# Patient Record
Sex: FEMALE | Race: WHITE | NOT HISPANIC OR LATINO | Employment: OTHER | URBAN - METROPOLITAN AREA
[De-identification: names, ages, dates, MRNs, and addresses within clinical notes are randomized per-mention and may not be internally consistent; named-entity substitution may affect disease eponyms.]

---

## 2017-01-24 DIAGNOSIS — J45.909 UNCOMPLICATED ASTHMA: ICD-10-CM

## 2017-01-24 RX ORDER — ALBUTEROL SULFATE 90 UG/1
AEROSOL, METERED RESPIRATORY (INHALATION)
Qty: 27 EACH | Refills: 0 | Status: SHIPPED | OUTPATIENT
Start: 2017-01-24 | End: 2018-03-12 | Stop reason: SDUPTHER

## 2017-03-23 DIAGNOSIS — I10 ESSENTIAL HYPERTENSION: ICD-10-CM

## 2017-03-23 RX ORDER — LISINOPRIL 30 MG/1
TABLET ORAL
Qty: 90 TABLET | Refills: 0 | Status: SHIPPED | OUTPATIENT
Start: 2017-03-23 | End: 2017-09-11 | Stop reason: SDUPTHER

## 2017-09-11 DIAGNOSIS — I10 ESSENTIAL HYPERTENSION: ICD-10-CM

## 2017-09-11 RX ORDER — LISINOPRIL 30 MG/1
TABLET ORAL
Qty: 90 TABLET | Refills: 0 | Status: SHIPPED | OUTPATIENT
Start: 2017-09-11 | End: 2017-11-27 | Stop reason: SDUPTHER

## 2017-11-27 DIAGNOSIS — I10 ESSENTIAL HYPERTENSION: ICD-10-CM

## 2017-11-27 RX ORDER — LISINOPRIL 30 MG/1
TABLET ORAL
Qty: 90 TABLET | Refills: 0 | Status: SHIPPED | OUTPATIENT
Start: 2017-11-27 | End: 2018-03-10 | Stop reason: SDUPTHER

## 2018-03-10 DIAGNOSIS — I10 ESSENTIAL HYPERTENSION: ICD-10-CM

## 2018-03-10 RX ORDER — LISINOPRIL 30 MG/1
TABLET ORAL
Qty: 90 TABLET | Refills: 0 | Status: SHIPPED | OUTPATIENT
Start: 2018-03-10 | End: 2019-03-07 | Stop reason: SDUPTHER

## 2018-03-12 DIAGNOSIS — J45.909 UNCOMPLICATED ASTHMA: ICD-10-CM

## 2018-03-12 RX ORDER — ALBUTEROL SULFATE 90 UG/1
AEROSOL, METERED RESPIRATORY (INHALATION)
Qty: 9 EACH | Refills: 2 | Status: SHIPPED | OUTPATIENT
Start: 2018-03-12 | End: 2019-06-21 | Stop reason: SDUPTHER

## 2018-05-14 ENCOUNTER — OFFICE VISIT (OUTPATIENT)
Dept: OPHTHALMOLOGY | Facility: CLINIC | Age: 68
End: 2018-05-14
Payer: MEDICARE

## 2018-05-14 DIAGNOSIS — Z46.0 ENCOUNTER FOR FITTING OR ADJUSTMENT OF SPECTACLES OR CONTACT LENSES: ICD-10-CM

## 2018-05-14 DIAGNOSIS — H52.13 HIGH MYOPIA, BOTH EYES: ICD-10-CM

## 2018-05-14 DIAGNOSIS — H25.13 CATARACT, NUCLEAR SCLEROTIC SENILE, BILATERAL: Primary | ICD-10-CM

## 2018-05-14 PROCEDURE — 92015 DETERMINE REFRACTIVE STATE: CPT | Mod: ,,, | Performed by: OPTOMETRIST

## 2018-05-14 PROCEDURE — 92014 COMPRE OPH EXAM EST PT 1/>: CPT | Mod: S$PBB,,, | Performed by: OPTOMETRIST

## 2018-05-14 PROCEDURE — 92310 CONTACT LENS FITTING OU: CPT | Mod: ,,, | Performed by: OPTOMETRIST

## 2018-05-14 PROCEDURE — 99211 OFF/OP EST MAY X REQ PHY/QHP: CPT | Mod: PBBFAC,PO | Performed by: OPTOMETRIST

## 2018-05-14 PROCEDURE — 99999 PR PBB SHADOW E&M-EST. PATIENT-LVL I: CPT | Mod: PBBFAC,,, | Performed by: OPTOMETRIST

## 2018-05-14 NOTE — PROGRESS NOTES
HPI     Right eye has been watering. Blurred vision both eyes right eye more.   Last eye exam 04/05/2016 TRF. update glasses and contact lenses RX.   Discuss fee to update contact lenses. Out of contact lenses last pair.    Last edited by Karma Arteaga on 5/14/2018 11:25 AM. (History)            Assessment /Plan     For exam results, see Encounter Report.    Cataract, nuclear sclerotic senile, bilateral    High myopia, both eyes    Encounter for fitting or adjustment of spectacles or contact lenses      Moderate cataracts OU, not surgical, OD>OS    Dispense Final Rx for glasses.  RTC 6 months CL ck, NS ck  Discussed above and answered questions.  Discussed signs and symptoms of retinal detachment.  Informed patient to return to clinic if any worsening of symptoms or decreased vision.  Sister had a retinal detachment

## 2018-05-15 ENCOUNTER — CLINICAL SUPPORT (OUTPATIENT)
Dept: AUDIOLOGY | Facility: CLINIC | Age: 68
End: 2018-05-15
Payer: MEDICARE

## 2018-05-15 DIAGNOSIS — Z46.1 ENCOUNTER FOR FITTING AND ADJUSTMENT OF HEARING AID OF BOTH EARS: Primary | ICD-10-CM

## 2018-05-15 DIAGNOSIS — H90.6 MIXED CONDUCTIVE AND SENSORINEURAL HEARING LOSS OF BOTH EARS: Primary | ICD-10-CM

## 2018-05-15 PROCEDURE — 92567 TYMPANOMETRY: CPT | Mod: PBBFAC,PO | Performed by: AUDIOLOGIST

## 2018-05-15 PROCEDURE — 92557 COMPREHENSIVE HEARING TEST: CPT | Mod: PBBFAC,PO | Performed by: AUDIOLOGIST

## 2018-05-15 NOTE — PROGRESS NOTES
Cindy Ramirez was seen 05/15/2018 for annual audiological evaluation.  She denies changes in her hearing since her last audiogram 4-20-16.  She wears an [employer] RITE hearing aid which is around 10 years old in her right ear.  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    Results reveal a moderate mixed hearing loss 250-8000 Hz for the right ear, and  severe-to-profound mixed hearing loss 250-8000 Hz for the left ear.   Speech Reception Thresholds were  45 dBHL for the right ear and 80 dBHL for the left ear.   Word recognition scores were excellent for the right ear and good for the left ear.   Tympanograms were unable to obtain for the right ear and unable to obtain for the left ear.    Patient was counseled on the above findings.    REC:  Annual audiogram  HA follow up (see notes under that visit)  HAC to replace Right HA and discuss/ demo left HA

## 2018-05-15 NOTE — PROGRESS NOTES
"Inspection of hearing aid revealed loss connection between speaker and device.  Replaced speaker with smaller speaker.  Pt appreciated that fit was improved with shorter speaker length.  Also replaced filter which is believed to be the root cause of "weak" sound quality and "fading out" of amplification.  Pt expressed interest in HA consult to discuss new technology as well as amplify her left ear.  We discussed that a left fit should be successful considering her excellent wors recognition scores in that ear.  Pt will call to schedule HAC at her convenience.  "

## 2019-03-07 DIAGNOSIS — I10 ESSENTIAL HYPERTENSION: ICD-10-CM

## 2019-03-07 RX ORDER — LISINOPRIL 30 MG/1
TABLET ORAL
Qty: 90 TABLET | Refills: 0 | Status: SHIPPED | OUTPATIENT
Start: 2019-03-07 | End: 2019-06-21 | Stop reason: SDUPTHER

## 2019-06-13 DIAGNOSIS — I10 ESSENTIAL HYPERTENSION: ICD-10-CM

## 2019-06-13 RX ORDER — LISINOPRIL 30 MG/1
TABLET ORAL
Qty: 90 TABLET | Refills: 0 | OUTPATIENT
Start: 2019-06-13

## 2019-06-16 DIAGNOSIS — I10 ESSENTIAL HYPERTENSION: ICD-10-CM

## 2019-06-16 RX ORDER — LISINOPRIL 30 MG/1
TABLET ORAL
Qty: 90 TABLET | Refills: 0 | OUTPATIENT
Start: 2019-06-16

## 2019-06-18 ENCOUNTER — OFFICE VISIT (OUTPATIENT)
Dept: OPHTHALMOLOGY | Facility: CLINIC | Age: 69
End: 2019-06-18
Payer: MEDICARE

## 2019-06-18 ENCOUNTER — PATIENT MESSAGE (OUTPATIENT)
Dept: PRIMARY CARE CLINIC | Facility: CLINIC | Age: 69
End: 2019-06-18

## 2019-06-18 DIAGNOSIS — J45.909 UNCOMPLICATED ASTHMA: ICD-10-CM

## 2019-06-18 DIAGNOSIS — I10 ESSENTIAL HYPERTENSION: ICD-10-CM

## 2019-06-18 DIAGNOSIS — H52.13 HIGH MYOPIA, BOTH EYES: ICD-10-CM

## 2019-06-18 DIAGNOSIS — H25.13 CATARACT, NUCLEAR SCLEROTIC SENILE, BILATERAL: Primary | ICD-10-CM

## 2019-06-18 DIAGNOSIS — Z46.0 ENCOUNTER FOR FITTING OR ADJUSTMENT OF SPECTACLES OR CONTACT LENSES: ICD-10-CM

## 2019-06-18 PROCEDURE — 99211 OFF/OP EST MAY X REQ PHY/QHP: CPT | Mod: PBBFAC | Performed by: OPTOMETRIST

## 2019-06-18 PROCEDURE — 99999 PR PBB SHADOW E&M-EST. PATIENT-LVL I: ICD-10-PCS | Mod: PBBFAC,,, | Performed by: OPTOMETRIST

## 2019-06-18 PROCEDURE — 92014 COMPRE OPH EXAM EST PT 1/>: CPT | Mod: S$PBB,,, | Performed by: OPTOMETRIST

## 2019-06-18 PROCEDURE — 92015 PR REFRACTION: ICD-10-PCS | Mod: ,,, | Performed by: OPTOMETRIST

## 2019-06-18 PROCEDURE — 99999 PR PBB SHADOW E&M-EST. PATIENT-LVL I: CPT | Mod: PBBFAC,,, | Performed by: OPTOMETRIST

## 2019-06-18 PROCEDURE — 92014 PR EYE EXAM, EST PATIENT,COMPREHESV: ICD-10-PCS | Mod: S$PBB,,, | Performed by: OPTOMETRIST

## 2019-06-18 PROCEDURE — 92015 DETERMINE REFRACTIVE STATE: CPT | Mod: ,,, | Performed by: OPTOMETRIST

## 2019-06-18 RX ORDER — ALBUTEROL SULFATE 90 UG/1
2 AEROSOL, METERED RESPIRATORY (INHALATION) EVERY 4 HOURS PRN
Qty: 9 EACH | Refills: 2 | OUTPATIENT
Start: 2019-06-18

## 2019-06-18 RX ORDER — LISINOPRIL 30 MG/1
30 TABLET ORAL DAILY
Qty: 90 TABLET | Refills: 0 | OUTPATIENT
Start: 2019-06-18

## 2019-06-18 NOTE — PROGRESS NOTES
HPI     Decreases distance visual acuity with contact lenses.   Last eye exam 05/14/2018 TRF.   Update glasses and contact lenses RX.  Discussed fee to update contact lenses.    Last edited by Steven Alvarez, OD on 6/18/2019  8:31 AM. (History)            Assessment /Plan     For exam results, see Encounter Report.    Cataract, nuclear sclerotic senile, bilateral    Encounter for fitting or adjustment of spectacles or contact lenses    High myopia, both eyes      Significant cataracts OU, discussed cataract surgery including Specialty IOL's  Failed glare test OD, long time SCL wearer.  Consult Ophthalmology for cataract evaluation at next available appointment.

## 2019-06-21 ENCOUNTER — OFFICE VISIT (OUTPATIENT)
Dept: PRIMARY CARE CLINIC | Facility: CLINIC | Age: 69
End: 2019-06-21
Payer: MEDICARE

## 2019-06-21 VITALS
TEMPERATURE: 99 F | HEIGHT: 64 IN | DIASTOLIC BLOOD PRESSURE: 94 MMHG | HEART RATE: 63 BPM | SYSTOLIC BLOOD PRESSURE: 180 MMHG | BODY MASS INDEX: 22.77 KG/M2 | OXYGEN SATURATION: 99 % | WEIGHT: 133.38 LBS

## 2019-06-21 DIAGNOSIS — I10 ESSENTIAL HYPERTENSION: ICD-10-CM

## 2019-06-21 DIAGNOSIS — J45.909 MODERATE ASTHMA WITHOUT COMPLICATION, UNSPECIFIED WHETHER PERSISTENT: ICD-10-CM

## 2019-06-21 DIAGNOSIS — E83.10 DISORDER OF IRON METABOLISM: ICD-10-CM

## 2019-06-21 DIAGNOSIS — M85.80 OSTEOPENIA WITH HIGH RISK OF FRACTURE: ICD-10-CM

## 2019-06-21 DIAGNOSIS — Z78.0 MENOPAUSE: ICD-10-CM

## 2019-06-21 DIAGNOSIS — Z11.59 NEED FOR HEPATITIS C SCREENING TEST: ICD-10-CM

## 2019-06-21 DIAGNOSIS — Z23 IMMUNIZATION DUE: ICD-10-CM

## 2019-06-21 DIAGNOSIS — E78.5 HYPERLIPIDEMIA, UNSPECIFIED HYPERLIPIDEMIA TYPE: ICD-10-CM

## 2019-06-21 DIAGNOSIS — I10 HYPERTENSION, UNSPECIFIED TYPE: Primary | ICD-10-CM

## 2019-06-21 DIAGNOSIS — J45.909 ASTHMA, UNSPECIFIED ASTHMA SEVERITY, UNSPECIFIED WHETHER COMPLICATED, UNSPECIFIED WHETHER PERSISTENT: ICD-10-CM

## 2019-06-21 DIAGNOSIS — Z12.11 COLON CANCER SCREENING: ICD-10-CM

## 2019-06-21 DIAGNOSIS — Z12.39 SCREENING BREAST EXAMINATION: ICD-10-CM

## 2019-06-21 PROCEDURE — 99204 OFFICE O/P NEW MOD 45 MIN: CPT | Mod: S$PBB,25,, | Performed by: FAMILY MEDICINE

## 2019-06-21 PROCEDURE — G0009 ADMIN PNEUMOCOCCAL VACCINE: HCPCS | Mod: PBBFAC,PN

## 2019-06-21 PROCEDURE — 99214 OFFICE O/P EST MOD 30 MIN: CPT | Mod: PBBFAC,PN,25 | Performed by: FAMILY MEDICINE

## 2019-06-21 PROCEDURE — 99999 PR PBB SHADOW E&M-EST. PATIENT-LVL IV: ICD-10-PCS | Mod: PBBFAC,,, | Performed by: FAMILY MEDICINE

## 2019-06-21 PROCEDURE — 99999 PR PBB SHADOW E&M-EST. PATIENT-LVL IV: CPT | Mod: PBBFAC,,, | Performed by: FAMILY MEDICINE

## 2019-06-21 PROCEDURE — 99204 PR OFFICE/OUTPT VISIT, NEW, LEVL IV, 45-59 MIN: ICD-10-PCS | Mod: S$PBB,25,, | Performed by: FAMILY MEDICINE

## 2019-06-21 RX ORDER — LISINOPRIL 30 MG/1
30 TABLET ORAL DAILY
Qty: 90 TABLET | Refills: 0 | Status: SHIPPED | OUTPATIENT
Start: 2019-06-21 | End: 2019-09-16 | Stop reason: SDUPTHER

## 2019-06-21 RX ORDER — ALENDRONATE SODIUM 70 MG/1
70 TABLET ORAL
Qty: 4 TABLET | Refills: 11 | Status: SHIPPED | OUTPATIENT
Start: 2019-06-21 | End: 2021-02-04 | Stop reason: SDUPTHER

## 2019-06-21 RX ORDER — ALBUTEROL SULFATE 90 UG/1
2 AEROSOL, METERED RESPIRATORY (INHALATION) EVERY 4 HOURS PRN
Qty: 9 EACH | Refills: 2 | Status: SHIPPED | OUTPATIENT
Start: 2019-06-21 | End: 2019-12-02 | Stop reason: SDUPTHER

## 2019-06-21 NOTE — PROGRESS NOTES
Two patient identifiers verified.  Allergies reviewed.  Prevnar 13 IM administered to L/deltoid per MD order.  Patient tolerated injection well; no redness, bleeding, or bruising noted to injection site.  Patient instructed to remain in clinic setting for 15 minutes.  Verbalizes understanding.

## 2019-06-22 NOTE — PROGRESS NOTES
Cindy Ramirez is a 69 y.o. female   F/u multiple medical issues  Source of history: Patient  Past Medical History:   Diagnosis Date    Asthma     Basal cell carcinoma     Hyperlipidemia     Hypertension      Patient  reports that she has never smoked. She has never used smokeless tobacco. She reports that she drinks about 1.2 oz of alcohol per week. She reports that she does not use drugs.  Family History   Problem Relation Age of Onset    Hypertension Mother     Hypertension Father     Cancer Father         prostate    Pancreatic cancer Maternal Grandmother     Heart failure Maternal Grandfather     Dementia Paternal Grandmother      ROS:Answers for HPI/ROS submitted by the patient on 6/20/2019   activity change: No  unexpected weight change: No  neck pain: No  hearing loss: Yes  rhinorrhea: No  trouble swallowing: No  eye discharge: No  visual disturbance: Yes  chest tightness: No  wheezing: Yes  chest pain: No  palpitations: No  blood in stool: No  constipation: No  vomiting: No  diarrhea: No  polydipsia: No  polyuria: No  difficulty urinating: No  hematuria: No  menstrual problem: No  dysuria: No  joint swelling: No  arthralgias: No  headaches: No  weakness: No  confusion: No  dysphoric mood: No    GENERAL: No fever, chills, fatigability or weight loss.  SKIN: No rashes, itching or changes in color or texture of skin.  HEAD: No headaches or recent head trauma.  EYES: Visual acuity fine. No photophobia, ocular pain or diplopia.  EARS: Denies ear pain, discharge or vertigo.  NOSE: No loss of smell, no epistaxis or postnasal drip.  MOUTH & THROAT: No hoarseness or change in voice. No excessive gum bleeding.  NODES: Denies swollen glands.  CHEST: Denies STEWART, cyanosis, wheezing, cough and sputum production.  CARDIOVASCULAR: Denies chest pain, PND, orthopnea or reduced exercise tolerance.  ABDOMEN: Appetite fine. No weight loss. Denies diarrhea, abdominal pain, hematemesis or blood in stool.  URINARY: No  flank pain, dysuria or hematuria.  PERIPHERAL VASCULAR: No claudication or cyanosis.  MUSCULOSKELETAL: No joint stiffness or swelling. Denies back pain.  NEUROLOGIC: No history of seizures, paralysis, alteration of gait or coordination.    OBJECTIVE:  APPEARANCE: thin no acute distress  Vitals:    06/21/19 1341   BP: (!) 180/94   Pulse: 63   Temp: 98.5 °F (36.9 °C)     SKIN: Normal skin turgor, no lesions.  HEENT: Both external auditory canals clear. Both tympanic membranes intact. PERRL. EOMI. Disk margins sharp. No tonsillar enlargement. No pharyngeal erythema or exudate. No stridor.  NECK: No bruits. No cervical spine tenderness. No cervical lymphadenopathy. No thyromegaly.  NODES: No cervical, axillary or inguinal lymph node enlargement.  CHEST: Breath sounds clear bilaterally. Lungs clear to auscultation & percussion. Good air movement. No rales. No retractions. No rhonchi. No stridor. No wheezes.  CARDIOVASCULAR: Normal S1, S2. No murmurs. No edema.  BREASTS: no masses palpated in either breast or axillary area, symmetry noted.  ABDOMEN: Bowel sounds normal. No palpable aortic enlargement. No CVA tenderness. No pulsatile mass. No rebound tenderness.  PERIPHERAL VASCULAR: Femoral pulses present and symmetrical. No edema.  MUSCULOSKELETAL: Degenerative changes of both ankles, foot, knee, wrist and hand.  BACK: No CVA tenderness. There is no spasm, tenderness or radiculopathy noted with palpation and there is full range of motion.   NEUROLOGIC: facila assymetry unchanged from previous  Cranial Nerves: II-XII grossly intact.  Motor: 5/5 strength major flexors/extensors. No tremor.  DTR's: Knees, Ankles 2+ and equal bilaterally; downgoing toes.  Sensory: Intact to light touch distally.  Gait & Posture: Normal gait and fine motion. No cerebellar signs.  MENTAL STATUS: Alert. Oriented x 3. Language skills normal. Memory intact. No suicidal ideation. Normal affect. Normal cognitive functions. Normal serial 7s. Can do  simple math. Well kept appearance.    ASSESSMENT/PLAN:   Cindy was seen today for annual exam and medication refill.    Diagnoses and all orders for this visit:      Facial assymmery    Hypertension, unspecified type  -     TSH; Future  -     Comprehensive metabolic panel; Future  -     CBC auto differential; Future    Hyperlipidemia, unspecified hyperlipidemia type  -     Lipid panel; Future  -     Hemoglobin A1c; Future    Disorder of iron metabolism   -     Hemoglobin A1c; Future    Screening breast examination  -     Mammo Digital Screening Bilateral With CAD; Future    Menopause  -     DXA Bone Density Spine And Hip; Future    Colon cancer screening  -     Case request GI: COLONOSCOPY    Immunization due  -     (In Office Administered) Pneumococcal Conjugate Vaccine (13 Valent) (IM)    Need for hepatitis C screening test  -     Hepatitis C antibody; Future    Essential hypertension  -     lisinopril (PRINIVIL,ZESTRIL) 30 MG tablet; Take 1 tablet (30 mg total) by mouth once daily.    Uncomplicated asthma  -     albuterol (PROAIR HFA) 90 mcg/actuation inhaler; Inhale 2 puffs into the lungs every 4 (four) hours as needed. Rescue    Osteopenia with high risk of fracture  -     alendronate (FOSAMAX) 70 MG tablet; Take 1 tablet (70 mg total) by mouth every 7 days.    labs will be reviewed when available.  All medical issued discussed questions answered   Total counselling time > 25 minutes.

## 2019-06-24 ENCOUNTER — LAB VISIT (OUTPATIENT)
Dept: LAB | Facility: HOSPITAL | Age: 69
End: 2019-06-24
Attending: FAMILY MEDICINE
Payer: MEDICARE

## 2019-06-24 DIAGNOSIS — E78.5 HYPERLIPIDEMIA, UNSPECIFIED HYPERLIPIDEMIA TYPE: ICD-10-CM

## 2019-06-24 DIAGNOSIS — Z11.59 NEED FOR HEPATITIS C SCREENING TEST: ICD-10-CM

## 2019-06-24 DIAGNOSIS — I10 HYPERTENSION, UNSPECIFIED TYPE: ICD-10-CM

## 2019-06-24 DIAGNOSIS — E83.10 DISORDER OF IRON METABOLISM: ICD-10-CM

## 2019-06-24 LAB
ALBUMIN SERPL BCP-MCNC: 3.9 G/DL (ref 3.5–5.2)
ALP SERPL-CCNC: 67 U/L (ref 55–135)
ALT SERPL W/O P-5'-P-CCNC: 10 U/L (ref 10–44)
ANION GAP SERPL CALC-SCNC: 7 MMOL/L (ref 8–16)
AST SERPL-CCNC: 15 U/L (ref 10–40)
BASOPHILS # BLD AUTO: 0.07 K/UL (ref 0–0.2)
BASOPHILS NFR BLD: 1.3 % (ref 0–1.9)
BILIRUB SERPL-MCNC: 0.7 MG/DL (ref 0.1–1)
BUN SERPL-MCNC: 13 MG/DL (ref 8–23)
CALCIUM SERPL-MCNC: 10 MG/DL (ref 8.7–10.5)
CHLORIDE SERPL-SCNC: 103 MMOL/L (ref 95–110)
CHOLEST SERPL-MCNC: 216 MG/DL (ref 120–199)
CHOLEST/HDLC SERPL: 2.3 {RATIO} (ref 2–5)
CO2 SERPL-SCNC: 30 MMOL/L (ref 23–29)
CREAT SERPL-MCNC: 0.8 MG/DL (ref 0.5–1.4)
DIFFERENTIAL METHOD: ABNORMAL
EOSINOPHIL # BLD AUTO: 0.2 K/UL (ref 0–0.5)
EOSINOPHIL NFR BLD: 3.2 % (ref 0–8)
ERYTHROCYTE [DISTWIDTH] IN BLOOD BY AUTOMATED COUNT: 12.7 % (ref 11.5–14.5)
EST. GFR  (AFRICAN AMERICAN): >60 ML/MIN/1.73 M^2
EST. GFR  (NON AFRICAN AMERICAN): >60 ML/MIN/1.73 M^2
ESTIMATED AVG GLUCOSE: 94 MG/DL (ref 68–131)
GLUCOSE SERPL-MCNC: 85 MG/DL (ref 70–110)
HBA1C MFR BLD HPLC: 4.9 % (ref 4–5.6)
HCT VFR BLD AUTO: 47.2 % (ref 37–48.5)
HDLC SERPL-MCNC: 92 MG/DL (ref 40–75)
HDLC SERPL: 42.6 % (ref 20–50)
HGB BLD-MCNC: 15.4 G/DL (ref 12–16)
IMM GRANULOCYTES # BLD AUTO: 0.01 K/UL (ref 0–0.04)
IMM GRANULOCYTES NFR BLD AUTO: 0.2 % (ref 0–0.5)
LDLC SERPL CALC-MCNC: 105.8 MG/DL (ref 63–159)
LYMPHOCYTES # BLD AUTO: 1.4 K/UL (ref 1–4.8)
LYMPHOCYTES NFR BLD: 27.2 % (ref 18–48)
MCH RBC QN AUTO: 31.6 PG (ref 27–31)
MCHC RBC AUTO-ENTMCNC: 32.6 G/DL (ref 32–36)
MCV RBC AUTO: 97 FL (ref 82–98)
MONOCYTES # BLD AUTO: 0.5 K/UL (ref 0.3–1)
MONOCYTES NFR BLD: 9.3 % (ref 4–15)
NEUTROPHILS # BLD AUTO: 3.1 K/UL (ref 1.8–7.7)
NEUTROPHILS NFR BLD: 58.8 % (ref 38–73)
NONHDLC SERPL-MCNC: 124 MG/DL
NRBC BLD-RTO: 0 /100 WBC
PLATELET # BLD AUTO: 258 K/UL (ref 150–350)
PMV BLD AUTO: 11 FL (ref 9.2–12.9)
POTASSIUM SERPL-SCNC: 4.5 MMOL/L (ref 3.5–5.1)
PROT SERPL-MCNC: 7.3 G/DL (ref 6–8.4)
RBC # BLD AUTO: 4.87 M/UL (ref 4–5.4)
SODIUM SERPL-SCNC: 140 MMOL/L (ref 136–145)
TRIGL SERPL-MCNC: 91 MG/DL (ref 30–150)
TSH SERPL DL<=0.005 MIU/L-ACNC: 1.51 UIU/ML (ref 0.4–4)
WBC # BLD AUTO: 5.29 K/UL (ref 3.9–12.7)

## 2019-06-24 PROCEDURE — 86803 HEPATITIS C AB TEST: CPT

## 2019-06-24 PROCEDURE — 83036 HEMOGLOBIN GLYCOSYLATED A1C: CPT

## 2019-06-24 PROCEDURE — 80053 COMPREHEN METABOLIC PANEL: CPT

## 2019-06-24 PROCEDURE — 36415 COLL VENOUS BLD VENIPUNCTURE: CPT | Mod: PN

## 2019-06-24 PROCEDURE — 85025 COMPLETE CBC W/AUTO DIFF WBC: CPT

## 2019-06-24 PROCEDURE — 84443 ASSAY THYROID STIM HORMONE: CPT

## 2019-06-24 PROCEDURE — 80061 LIPID PANEL: CPT

## 2019-06-25 LAB — HCV AB SERPL QL IA: NEGATIVE

## 2019-06-29 ENCOUNTER — TELEPHONE (OUTPATIENT)
Dept: GASTROENTEROLOGY | Facility: CLINIC | Age: 69
End: 2019-06-29

## 2019-07-02 ENCOUNTER — HOSPITAL ENCOUNTER (OUTPATIENT)
Dept: RADIOLOGY | Facility: CLINIC | Age: 69
Discharge: HOME | End: 2019-07-02
Attending: FAMILY MEDICINE
Payer: MEDICARE

## 2019-07-02 ENCOUNTER — HOSPITAL ENCOUNTER (OUTPATIENT)
Dept: RADIOLOGY | Facility: HOSPITAL | Age: 69
Discharge: HOME | End: 2019-07-02
Attending: FAMILY MEDICINE
Payer: MEDICARE

## 2019-07-02 DIAGNOSIS — Z78.0 MENOPAUSE: ICD-10-CM

## 2019-07-02 DIAGNOSIS — Z12.39 SCREENING BREAST EXAMINATION: ICD-10-CM

## 2019-07-02 PROCEDURE — 77080 DXA BONE DENSITY AXIAL: CPT | Mod: 26,,, | Performed by: INTERNAL MEDICINE

## 2019-07-02 PROCEDURE — 77063 MAMMO DIGITAL SCREENING BILAT WITH TOMOSYNTHESIS_CAD: ICD-10-PCS | Mod: 26,,, | Performed by: RADIOLOGY

## 2019-07-02 PROCEDURE — 77067 MAMMO DIGITAL SCREENING BILAT WITH TOMOSYNTHESIS_CAD: ICD-10-PCS | Mod: 26,,, | Performed by: RADIOLOGY

## 2019-07-02 PROCEDURE — 77080 DEXA BONE DENSITY SPINE HIP: ICD-10-PCS | Mod: 26,,, | Performed by: INTERNAL MEDICINE

## 2019-07-02 PROCEDURE — 77063 BREAST TOMOSYNTHESIS BI: CPT | Mod: 26,,, | Performed by: RADIOLOGY

## 2019-07-02 PROCEDURE — 77067 SCR MAMMO BI INCL CAD: CPT | Mod: 26,,, | Performed by: RADIOLOGY

## 2019-07-02 PROCEDURE — 77067 SCR MAMMO BI INCL CAD: CPT | Mod: TC

## 2019-07-02 PROCEDURE — 77080 DXA BONE DENSITY AXIAL: CPT | Mod: TC

## 2019-07-31 ENCOUNTER — OFFICE VISIT (OUTPATIENT)
Dept: OPHTHALMOLOGY | Facility: CLINIC | Age: 69
End: 2019-07-31
Payer: MEDICARE

## 2019-07-31 DIAGNOSIS — H25.12 NUCLEAR SENILE CATARACT OF LEFT EYE: ICD-10-CM

## 2019-07-31 DIAGNOSIS — H25.11 NUCLEAR SENILE CATARACT OF RIGHT EYE: Primary | ICD-10-CM

## 2019-07-31 PROCEDURE — 99999 PR PBB SHADOW E&M-EST. PATIENT-LVL I: CPT | Mod: PBBFAC,,, | Performed by: OPHTHALMOLOGY

## 2019-07-31 PROCEDURE — 92014 PR EYE EXAM, EST PATIENT,COMPREHESV: ICD-10-PCS | Mod: S$PBB,,, | Performed by: OPHTHALMOLOGY

## 2019-07-31 PROCEDURE — 99999 PR PBB SHADOW E&M-EST. PATIENT-LVL I: ICD-10-PCS | Mod: PBBFAC,,, | Performed by: OPHTHALMOLOGY

## 2019-07-31 PROCEDURE — 99211 OFF/OP EST MAY X REQ PHY/QHP: CPT | Mod: PBBFAC | Performed by: OPHTHALMOLOGY

## 2019-07-31 PROCEDURE — 92014 COMPRE OPH EXAM EST PT 1/>: CPT | Mod: S$PBB,,, | Performed by: OPHTHALMOLOGY

## 2019-07-31 RX ORDER — FERROUS SULFATE, DRIED 160(50) MG
1 TABLET, EXTENDED RELEASE ORAL 2 TIMES DAILY WITH MEALS
COMMUNITY

## 2019-07-31 NOTE — PROGRESS NOTES
HPI     Patient was referred by TRF  For a cataract eval, patient c/o an overall   decline in her vision in OD, patient has a h/o SCL  Wear for approx. 60   years but has not worn for 2 weeks. C/o occasional trouble with glare       NPP-REF.BY TRF  H/O SCL WEAR 60 YEARS  CATS OU    Last edited by Lucio Mitchell MD on 7/31/2019  2:11 PM. (History)            Assessment /Plan     For exam results, see Encounter Report.      ICD-10-CM ICD-9-CM    1. Nuclear senile cataract of right eye H25.11 366.16 Would be topical. OD first. Recheck measurements before scheduling phaco. Penciled in for august 22nd. Needs to see jesika and do orders next visit    2. Nuclear senile cataract of left eye H25.12 366.16        Return to clinic 2 weeks to repeat measurements

## 2019-08-14 ENCOUNTER — OFFICE VISIT (OUTPATIENT)
Dept: OPHTHALMOLOGY | Facility: CLINIC | Age: 69
End: 2019-08-14
Payer: MEDICARE

## 2019-08-14 DIAGNOSIS — H25.12 NUCLEAR SENILE CATARACT OF LEFT EYE: ICD-10-CM

## 2019-08-14 DIAGNOSIS — H25.11 NUCLEAR SENILE CATARACT OF RIGHT EYE: Primary | ICD-10-CM

## 2019-08-14 PROCEDURE — 99999 PR PBB SHADOW E&M-EST. PATIENT-LVL II: ICD-10-PCS | Mod: PBBFAC,,, | Performed by: OPHTHALMOLOGY

## 2019-08-14 PROCEDURE — 92136 OPHTHALMIC BIOMETRY: CPT | Mod: PBBFAC,RT | Performed by: OPHTHALMOLOGY

## 2019-08-14 PROCEDURE — 92136 IOL MASTER - OD - RIGHT EYE: ICD-10-PCS | Mod: 26,S$PBB,RT, | Performed by: OPHTHALMOLOGY

## 2019-08-14 PROCEDURE — 99499 NO LOS: ICD-10-PCS | Mod: S$PBB,,, | Performed by: OPHTHALMOLOGY

## 2019-08-14 PROCEDURE — 99212 OFFICE O/P EST SF 10 MIN: CPT | Mod: PBBFAC | Performed by: OPHTHALMOLOGY

## 2019-08-14 PROCEDURE — 99499 UNLISTED E&M SERVICE: CPT | Mod: S$PBB,,, | Performed by: OPHTHALMOLOGY

## 2019-08-14 PROCEDURE — 99999 PR PBB SHADOW E&M-EST. PATIENT-LVL II: CPT | Mod: PBBFAC,,, | Performed by: OPHTHALMOLOGY

## 2019-08-14 RX ORDER — DIFLUPREDNATE OPHTHALMIC 0.5 MG/ML
1 EMULSION OPHTHALMIC 4 TIMES DAILY
Qty: 1 BOTTLE | Refills: 1 | Status: SHIPPED | OUTPATIENT
Start: 2019-08-14 | End: 2019-09-13

## 2019-08-14 NOTE — PROGRESS NOTES
HPI     Patient returns for repeat ascan, topog, and first Verion, patient is   scheduled on 08/22/19 and needs to see Karlee, patient has not taken her   81mg aspirin for one week.        REF.BY TRF  H/O SCL WEAR 60 YEARS  CATS OU    Last edited by OKSANA Kurtz on 8/14/2019  3:15 PM. (History)            Assessment /Plan     For exam results, see Encounter Report.      ICD-10-CM ICD-9-CM    1. Nuclear senile cataract of right eye H25.11 366.16 Visually Significant Cataract OD  Patient reports decreased vision consistent with the clinical amount of lenticular opacity, which reaches the level of visual significance and affects activities of daily living including reading and glare. Risks, benefits, and alternatives to cataract surgery were discussed.   The pt expressed a desire to proceed with surgery with the potential for some reasonable degree of visual improvement.    Discussed IOL options and refractive outcomes for this patient.    Phaco right eye,   Topical-could block if necessary  monofocal IOL.  Will aim for distance  Referral to [facility] for Ophthalmic surgery  Prescriptions sent for preoperative medications  Durezol  Explained that patient may need glasses after surgery.  Discussed that vision may be limited by optic nerve findings      12.5 WF          2. Nuclear senile cataract of left eye H25.12 366.16 Will be monofocal, topical        Return to clinic for phaco OD   OS penciled in for 9/5

## 2019-08-22 ENCOUNTER — OUTSIDE PLACE OF SERVICE (OUTPATIENT)
Dept: ADMINISTRATIVE | Facility: OTHER | Age: 69
End: 2019-08-22
Payer: MEDICARE

## 2019-08-22 PROCEDURE — 66984 PR REMOVAL, CATARACT, W/INSRT INTRAOC LENS, W/O ENDO CYCLO: ICD-10-PCS | Mod: RT,,, | Performed by: OPHTHALMOLOGY

## 2019-08-22 PROCEDURE — 66984 XCAPSL CTRC RMVL W/O ECP: CPT | Mod: RT,,, | Performed by: OPHTHALMOLOGY

## 2019-08-23 ENCOUNTER — OFFICE VISIT (OUTPATIENT)
Dept: OPHTHALMOLOGY | Facility: CLINIC | Age: 69
End: 2019-08-23
Payer: MEDICARE

## 2019-08-23 DIAGNOSIS — Z98.41 CATARACT EXTRACTION STATUS, RIGHT: Primary | ICD-10-CM

## 2019-08-23 DIAGNOSIS — H25.12 NUCLEAR SENILE CATARACT OF LEFT EYE: ICD-10-CM

## 2019-08-23 PROCEDURE — 99999 PR PBB SHADOW E&M-EST. PATIENT-LVL I: CPT | Mod: PBBFAC,,, | Performed by: OPHTHALMOLOGY

## 2019-08-23 PROCEDURE — 99999 PR PBB SHADOW E&M-EST. PATIENT-LVL I: ICD-10-PCS | Mod: PBBFAC,,, | Performed by: OPHTHALMOLOGY

## 2019-08-23 PROCEDURE — 99024 PR POST-OP FOLLOW-UP VISIT: ICD-10-PCS | Mod: POP,,, | Performed by: OPHTHALMOLOGY

## 2019-08-23 PROCEDURE — 99211 OFF/OP EST MAY X REQ PHY/QHP: CPT | Mod: PBBFAC | Performed by: OPHTHALMOLOGY

## 2019-08-23 PROCEDURE — 99024 POSTOP FOLLOW-UP VISIT: CPT | Mod: POP,,, | Performed by: OPHTHALMOLOGY

## 2019-08-23 NOTE — PROGRESS NOTES
HPI     Post-op Evaluation      Additional comments: 1 day Phaco OD 8/22/19, Durezol QID OD              Comments      Pt states she was having some burning and tearing yesterday. Vision was   foggy. Much better today. No pain. Using just the Durezol drops.       REF.BY TRF  H/O SCL WEAR 60 YEARS  Phaco OD 8/22/19 +12.5WF  NSC OS            Last edited by Flavio Roper on 8/23/2019  8:32 AM. (History)            Assessment /Plan     For exam results, see Encounter Report.      ICD-10-CM ICD-9-CM    1. Cataract extraction status, right Z98.41 V45.61    2. Nuclear senile cataract of left eye H25.12 366.16 Will proceed with Phaco OS next visit topical       PO Day 1 S/P Phaco/IOL  right eye  Doing well.    Use Durezol QID      Reinstructed in importance of absolute compliance with Post-OP instructions including medications, shield at bedtime, and limitation of activities. Follow up appointments in approximately one and six weeks or call immediately for increased pain, redness or vision loss.

## 2019-08-28 ENCOUNTER — OFFICE VISIT (OUTPATIENT)
Dept: OPHTHALMOLOGY | Facility: CLINIC | Age: 69
End: 2019-08-28
Payer: MEDICARE

## 2019-08-28 DIAGNOSIS — Z98.41 CATARACT EXTRACTION STATUS, RIGHT: Primary | ICD-10-CM

## 2019-08-28 DIAGNOSIS — H25.12 NUCLEAR SENILE CATARACT OF LEFT EYE: ICD-10-CM

## 2019-08-28 PROCEDURE — 99212 OFFICE O/P EST SF 10 MIN: CPT | Mod: PBBFAC,25 | Performed by: OPHTHALMOLOGY

## 2019-08-28 PROCEDURE — 99024 POSTOP FOLLOW-UP VISIT: CPT | Mod: POP,,, | Performed by: OPHTHALMOLOGY

## 2019-08-28 PROCEDURE — 92136 IOL MASTER - MOD 26 - OS - LEFT EYE: ICD-10-PCS | Mod: 26,S$PBB,LT, | Performed by: OPHTHALMOLOGY

## 2019-08-28 PROCEDURE — 99999 PR PBB SHADOW E&M-EST. PATIENT-LVL II: ICD-10-PCS | Mod: PBBFAC,,, | Performed by: OPHTHALMOLOGY

## 2019-08-28 PROCEDURE — 99024 PR POST-OP FOLLOW-UP VISIT: ICD-10-PCS | Mod: POP,,, | Performed by: OPHTHALMOLOGY

## 2019-08-28 PROCEDURE — 92136 OPHTHALMIC BIOMETRY: CPT | Mod: PBBFAC,LT | Performed by: OPHTHALMOLOGY

## 2019-08-28 PROCEDURE — 99999 PR PBB SHADOW E&M-EST. PATIENT-LVL II: CPT | Mod: PBBFAC,,, | Performed by: OPHTHALMOLOGY

## 2019-08-28 RX ORDER — DIFLUPREDNATE OPHTHALMIC 0.5 MG/ML
1 EMULSION OPHTHALMIC 4 TIMES DAILY
Qty: 1 BOTTLE | Refills: 1 | Status: SHIPPED | OUTPATIENT
Start: 2019-08-28 | End: 2019-09-27

## 2019-08-28 NOTE — PROGRESS NOTES
HPI     Patient returns for a 6 day p.o. Visit, patient states her vision is   great.    Last edited by OKSANA Kurtz on 8/28/2019  1:35 PM. (History)            Assessment /Plan     For exam results, see Encounter Report.      ICD-10-CM ICD-9-CM    1. Cataract extraction status, right Z98.41 V45.61 Doing well   2. Nuclear senile cataract of left eye H25.12 366.16 Patient reports decreased vision in the fellow eye consistent with the clinical amount of lenticular opacity, which reaches the level of visual significance and affects activities of daily living including reading and glare. Risks, benefits, and alternatives to cataract surgery were discussed and pt desired to schedule cataract surgery. Pt was consented and the biometry and lens options were reviewed.    Phaco left   Topical   Requests a monofocal  IOL.  Will aim for distance  Patient given prescriptions for  Durezol  Explained that patient may need glasses after surgery.           Durezol tid OD  Phaco OS next week.

## 2019-08-30 ENCOUNTER — TELEPHONE (OUTPATIENT)
Dept: OPHTHALMOLOGY | Facility: CLINIC | Age: 69
End: 2019-08-30

## 2019-09-12 ENCOUNTER — OFFICE VISIT (OUTPATIENT)
Dept: OPHTHALMOLOGY | Facility: CLINIC | Age: 69
End: 2019-09-12
Payer: MEDICARE

## 2019-09-12 ENCOUNTER — OUTSIDE PLACE OF SERVICE (OUTPATIENT)
Dept: ADMINISTRATIVE | Facility: OTHER | Age: 69
End: 2019-09-12
Payer: MEDICARE

## 2019-09-12 DIAGNOSIS — Z98.41 CATARACT EXTRACTION STATUS, RIGHT: ICD-10-CM

## 2019-09-12 DIAGNOSIS — Z98.42 CATARACT EXTRACTION STATUS, LEFT: Primary | ICD-10-CM

## 2019-09-12 PROCEDURE — 99999 PR PBB SHADOW E&M-EST. PATIENT-LVL II: ICD-10-PCS | Mod: PBBFAC,,, | Performed by: OPHTHALMOLOGY

## 2019-09-12 PROCEDURE — 99024 PR POST-OP FOLLOW-UP VISIT: ICD-10-PCS | Mod: POP,,, | Performed by: OPHTHALMOLOGY

## 2019-09-12 PROCEDURE — 66984 PR REMOVAL, CATARACT, W/INSRT INTRAOC LENS, W/O ENDO CYCLO: ICD-10-PCS | Mod: 79,LT,, | Performed by: OPHTHALMOLOGY

## 2019-09-12 PROCEDURE — 99024 POSTOP FOLLOW-UP VISIT: CPT | Mod: POP,,, | Performed by: OPHTHALMOLOGY

## 2019-09-12 PROCEDURE — 99999 PR PBB SHADOW E&M-EST. PATIENT-LVL II: CPT | Mod: PBBFAC,,, | Performed by: OPHTHALMOLOGY

## 2019-09-12 PROCEDURE — 99212 OFFICE O/P EST SF 10 MIN: CPT | Mod: PBBFAC | Performed by: OPHTHALMOLOGY

## 2019-09-12 PROCEDURE — 66984 XCAPSL CTRC RMVL W/O ECP: CPT | Mod: 79,LT,, | Performed by: OPHTHALMOLOGY

## 2019-09-12 NOTE — PROGRESS NOTES
HPI     REF.BY TRF  H/O SCL WEAR 60 YEARS  Phaco OD +12.5 SN60WF / CDE: 9.00 / 8/22/19   Phaco OS                                 9/12/2019        OD DUREZOL TID  OS Durezol QID     Last edited by Aleja Diaz, COA on 9/12/2019 12:55 PM. (History)            Assessment /Plan     For exam results, see Encounter Report.      ICD-10-CM ICD-9-CM    1. Cataract extraction status, left Z98.42 V45.61 PO Day 1 S/P Phaco/IOL  left eye  Doing well.    Use Durezol QID    Reinstructed in importance of absolute compliance with Post-OP instructions including medications, shield at bedtime, and limitation of activities. Follow up appointments in approximately one and six weeks or call immediately for increased pain, redness or vision loss.            2. Cataract extraction status, right Z98.41 V45.61        Durezol QID OS and Taper Durezol  BID OD  X 9/19 then QD x 9/26   RETURN TO CLINIC 1 week with TRF to resume care

## 2019-09-14 DIAGNOSIS — I10 ESSENTIAL HYPERTENSION: ICD-10-CM

## 2019-09-16 DIAGNOSIS — I10 ESSENTIAL HYPERTENSION: ICD-10-CM

## 2019-09-16 RX ORDER — LISINOPRIL 30 MG/1
30 TABLET ORAL DAILY
Qty: 30 TABLET | Refills: 0 | Status: SHIPPED | OUTPATIENT
Start: 2019-09-16 | End: 2021-02-04

## 2019-09-16 RX ORDER — LISINOPRIL 30 MG/1
TABLET ORAL
Qty: 30 TABLET | Refills: 0 | Status: SHIPPED | OUTPATIENT
Start: 2019-09-16 | End: 2019-12-02 | Stop reason: SDUPTHER

## 2019-09-20 ENCOUNTER — OFFICE VISIT (OUTPATIENT)
Dept: OPHTHALMOLOGY | Facility: CLINIC | Age: 69
End: 2019-09-20
Payer: MEDICARE

## 2019-09-20 DIAGNOSIS — Z98.42 CATARACT EXTRACTION STATUS, LEFT: Primary | ICD-10-CM

## 2019-09-20 DIAGNOSIS — H40.043 BORDERLINE STEROID-INDUCED GLAUCOMA OF BOTH EYES: ICD-10-CM

## 2019-09-20 DIAGNOSIS — Z98.41 CATARACT EXTRACTION STATUS, RIGHT: ICD-10-CM

## 2019-09-20 PROCEDURE — 99211 OFF/OP EST MAY X REQ PHY/QHP: CPT | Mod: PBBFAC | Performed by: OPTOMETRIST

## 2019-09-20 PROCEDURE — 99024 PR POST-OP FOLLOW-UP VISIT: ICD-10-PCS | Mod: POP,,, | Performed by: OPTOMETRIST

## 2019-09-20 PROCEDURE — 99999 PR PBB SHADOW E&M-EST. PATIENT-LVL I: CPT | Mod: PBBFAC,,, | Performed by: OPTOMETRIST

## 2019-09-20 PROCEDURE — 99024 POSTOP FOLLOW-UP VISIT: CPT | Mod: POP,,, | Performed by: OPTOMETRIST

## 2019-09-20 PROCEDURE — 99999 PR PBB SHADOW E&M-EST. PATIENT-LVL I: ICD-10-PCS | Mod: PBBFAC,,, | Performed by: OPTOMETRIST

## 2019-09-20 RX ORDER — TIMOLOL MALEATE 5 MG/ML
1 SOLUTION/ DROPS OPHTHALMIC 2 TIMES DAILY
Qty: 10 ML | Refills: 6 | Status: SHIPPED | OUTPATIENT
Start: 2019-09-20 | End: 2021-02-04

## 2019-09-20 NOTE — PROGRESS NOTES
HPI     1 week S/P cataract SX left eye.  Last eye visit 09/12/2019 [facility].  No visual complaints.  Patient states left eye is better than right eye.    H/O SCL WEAR 60 YEARS  Phaco OD +12.5 SN60WF / CDE: 9.00 / 8/22/19   Phaco OS                                 9/12/2019  OD DUREZOL once daily  OS Durezol QID     Last edited by Steven Alvarez, OD on 9/20/2019  3:25 PM. (History)            Assessment /Plan     For exam results, see Encounter Report.    Cataract extraction status, left    Cataract extraction status, right    Borderline steroid-induced glaucoma of both eyes  -     timolol maleate 0.5% (TIMOPTIC) 0.5 % Drop; Place 1 drop into both eyes 2 (two) times daily.  Dispense: 10 mL; Refill: 6      Wean durazol OD once daily for 5 days then D/C    Wean Durazol OS qid x 5 days then tid x 5 days then bid x 5 days then daily x 5 days then D/C    IOP was elevated today, start Timolol bid OU until next visit in 3 weeks, sooner if any pain or vision changes.    RTC 3 weeks

## 2019-09-20 NOTE — Clinical Note
IOP was elevated with Durazol, will add T 0.5% bid OU until weaned from Druazol, I will see her in 3 weeks to recheck. She sees great and very pleased with outcome.

## 2019-10-03 NOTE — OR NURSING
Spoke with pt to schedule colonoscopy.  She stated that she would like to wait until January to schedule.  She stated that she would call us back at that time. NM

## 2019-10-05 ENCOUNTER — TELEPHONE (OUTPATIENT)
Dept: GASTROENTEROLOGY | Facility: CLINIC | Age: 69
End: 2019-10-05

## 2019-10-05 NOTE — TELEPHONE ENCOUNTER
S/w pt that states she wants to wait til Jan. 2020 to schedule. Advised pt to call back in Nov. To get on the schedule for Jan. Pt verbalized understanding.

## 2019-10-18 ENCOUNTER — OFFICE VISIT (OUTPATIENT)
Dept: OPHTHALMOLOGY | Facility: CLINIC | Age: 69
End: 2019-10-18
Payer: MEDICARE

## 2019-10-18 DIAGNOSIS — Z98.41 CATARACT EXTRACTION STATUS, RIGHT: ICD-10-CM

## 2019-10-18 DIAGNOSIS — Z98.42 CATARACT EXTRACTION STATUS, LEFT: Primary | ICD-10-CM

## 2019-10-18 PROCEDURE — 99211 OFF/OP EST MAY X REQ PHY/QHP: CPT | Mod: PBBFAC | Performed by: OPTOMETRIST

## 2019-10-18 PROCEDURE — 99024 POSTOP FOLLOW-UP VISIT: CPT | Mod: POP,,, | Performed by: OPTOMETRIST

## 2019-10-18 PROCEDURE — 99999 PR PBB SHADOW E&M-EST. PATIENT-LVL I: ICD-10-PCS | Mod: PBBFAC,,, | Performed by: OPTOMETRIST

## 2019-10-18 PROCEDURE — 99999 PR PBB SHADOW E&M-EST. PATIENT-LVL I: CPT | Mod: PBBFAC,,, | Performed by: OPTOMETRIST

## 2019-10-18 PROCEDURE — 99024 PR POST-OP FOLLOW-UP VISIT: ICD-10-PCS | Mod: POP,,, | Performed by: OPTOMETRIST

## 2019-10-18 NOTE — PROGRESS NOTES
HPI     3 week S/P cataract SX left eye.  3 weeks IOP check.  Last eye visit 09/20/2019 TRF.  Eyes chiang sometimes.  Patient states eyeballs hurts in the morning.  Medication eye drops if any: Timolol  Date last eye visit: 09/20/2019  Last full exam: 07/31/2019  Last IOP : 24/28  Last dilated eye exam and SDP's: 07/31/2019  Last HVF and HRT :  High IOP: 24/28  CCT:  Family Hx POAG: none    H/O SCL WEAR 60 YEARS  Phaco OD +12.5 SN60WF / CDE: 9.00 / 8/22/19   Phaco OS                                 9/12/2019  OD DUREZOL once daily  OS Durezol QID     Last edited by Karma Arteaga on 10/18/2019  2:22 PM. (History)            Assessment /Plan     For exam results, see Encounter Report.    Cataract extraction status, left    Cataract extraction status, right      Lower IOP now off Durezol.  Stop timolol bid OU    RTC 2 months IOP ck

## 2019-11-07 DIAGNOSIS — I10 ESSENTIAL HYPERTENSION: ICD-10-CM

## 2019-11-07 RX ORDER — LISINOPRIL 30 MG/1
TABLET ORAL
Qty: 30 TABLET | Refills: 0 | OUTPATIENT
Start: 2019-11-07

## 2019-11-12 DIAGNOSIS — I10 ESSENTIAL HYPERTENSION: ICD-10-CM

## 2019-11-12 RX ORDER — LISINOPRIL 30 MG/1
TABLET ORAL
Qty: 30 TABLET | Refills: 0 | OUTPATIENT
Start: 2019-11-12

## 2019-11-15 ENCOUNTER — TELEPHONE (OUTPATIENT)
Dept: GASTROENTEROLOGY | Facility: CLINIC | Age: 69
End: 2019-11-15

## 2019-11-15 NOTE — TELEPHONE ENCOUNTER
Spoke with pt. Scheduled ordered c-scope. Pt verbalized understanding.   Prep instructions & reminder letter placed in mail.

## 2019-11-22 DIAGNOSIS — I10 ESSENTIAL HYPERTENSION: ICD-10-CM

## 2019-11-22 RX ORDER — LISINOPRIL 30 MG/1
TABLET ORAL
Qty: 30 TABLET | Refills: 0 | OUTPATIENT
Start: 2019-11-22

## 2019-11-26 DIAGNOSIS — I10 ESSENTIAL HYPERTENSION: ICD-10-CM

## 2019-11-26 RX ORDER — LISINOPRIL 30 MG/1
TABLET ORAL
Qty: 30 TABLET | Refills: 0 | OUTPATIENT
Start: 2019-11-26

## 2019-12-02 DIAGNOSIS — I10 ESSENTIAL HYPERTENSION: ICD-10-CM

## 2019-12-02 DIAGNOSIS — J45.909 MODERATE ASTHMA WITHOUT COMPLICATION, UNSPECIFIED WHETHER PERSISTENT: ICD-10-CM

## 2019-12-02 RX ORDER — LISINOPRIL 30 MG/1
30 TABLET ORAL DAILY
Qty: 30 TABLET | Refills: 0 | Status: CANCELLED | OUTPATIENT
Start: 2019-12-02

## 2019-12-03 RX ORDER — ALBUTEROL SULFATE 90 UG/1
2 AEROSOL, METERED RESPIRATORY (INHALATION) EVERY 4 HOURS PRN
Qty: 9 EACH | Refills: 2 | Status: SHIPPED | OUTPATIENT
Start: 2019-12-03 | End: 2021-02-04 | Stop reason: SDUPTHER

## 2019-12-03 RX ORDER — LISINOPRIL 30 MG/1
30 TABLET ORAL DAILY
Qty: 90 TABLET | Refills: 3 | Status: SHIPPED | OUTPATIENT
Start: 2019-12-03 | End: 2020-11-30

## 2019-12-18 ENCOUNTER — OFFICE VISIT (OUTPATIENT)
Dept: OPHTHALMOLOGY | Facility: CLINIC | Age: 69
End: 2019-12-18
Payer: MEDICARE

## 2019-12-18 DIAGNOSIS — H40.043 BORDERLINE STEROID-INDUCED GLAUCOMA OF BOTH EYES: ICD-10-CM

## 2019-12-18 DIAGNOSIS — Z98.42 CATARACT EXTRACTION STATUS, LEFT: Primary | ICD-10-CM

## 2019-12-18 DIAGNOSIS — Z98.41 CATARACT EXTRACTION STATUS, RIGHT: ICD-10-CM

## 2019-12-18 PROCEDURE — 92014 PR EYE EXAM, EST PATIENT,COMPREHESV: ICD-10-PCS | Mod: S$PBB,,, | Performed by: OPTOMETRIST

## 2019-12-18 PROCEDURE — 99211 OFF/OP EST MAY X REQ PHY/QHP: CPT | Mod: PBBFAC | Performed by: OPTOMETRIST

## 2019-12-18 PROCEDURE — 99999 PR PBB SHADOW E&M-EST. PATIENT-LVL I: ICD-10-PCS | Mod: PBBFAC,,, | Performed by: OPTOMETRIST

## 2019-12-18 PROCEDURE — 99999 PR PBB SHADOW E&M-EST. PATIENT-LVL I: CPT | Mod: PBBFAC,,, | Performed by: OPTOMETRIST

## 2019-12-18 PROCEDURE — 92014 COMPRE OPH EXAM EST PT 1/>: CPT | Mod: S$PBB,,, | Performed by: OPTOMETRIST

## 2019-12-18 NOTE — PROGRESS NOTES
HPI     IOP ck, possible steroid responder    Last edited by Steven Alvarez, OD on 12/18/2019  1:07 PM. (History)            Assessment /Plan     For exam results, see Encounter Report.    Cataract extraction status, left    Cataract extraction status, right    Borderline steroid-induced glaucoma of both eyes      Continued low normal IOP OU    RTC 4 months DFE full exam

## 2019-12-31 ENCOUNTER — TELEPHONE (OUTPATIENT)
Dept: GASTROENTEROLOGY | Facility: CLINIC | Age: 69
End: 2019-12-31

## 2019-12-31 NOTE — TELEPHONE ENCOUNTER
----- Message from Daphney Arteaga sent at 12/31/2019  9:47 AM CST -----  Type:  Sooner Apoointment Request    Caller is requesting a sooner appointment.  Caller declined first available appointment listed below.  Caller will not accept being placed on the waitlist and is requesting a message be sent to doctor.    Name of Caller:  Self   When is the first available appointment?  NA  Symptoms:  NA  Best Call Back Number:  [phone]  Additional Information:  Patient requesting to reschedule procedure.

## 2019-12-31 NOTE — TELEPHONE ENCOUNTER
Spoke with pt. Discussed prep & foods to avoid. Pt stated she will keep her c-scope on 1/8. Pt verbalized understanding to all.

## 2020-01-06 ENCOUNTER — TELEPHONE (OUTPATIENT)
Dept: GASTROENTEROLOGY | Facility: CLINIC | Age: 70
End: 2020-01-06

## 2020-01-06 NOTE — TELEPHONE ENCOUNTER
----- Message from Anish Graves sent at 1/6/2020 12:22 PM CST -----  Contact: pt  Type: Needs Medical Advice    Who Called:  pt    Best Call Back Number: [phone]  Additional Information: pt is scheduled 1.8.20 for a colonoscopy and has not been given info for the colonoscopy prep. Please call to advise

## 2020-01-08 ENCOUNTER — ANESTHESIA (OUTPATIENT)
Dept: ENDOSCOPY | Facility: HOSPITAL | Age: 70
End: 2020-01-08
Payer: MEDICARE

## 2020-01-08 ENCOUNTER — ANESTHESIA EVENT (OUTPATIENT)
Dept: ENDOSCOPY | Facility: HOSPITAL | Age: 70
End: 2020-01-08
Payer: MEDICARE

## 2020-01-08 ENCOUNTER — HOSPITAL ENCOUNTER (OUTPATIENT)
Facility: HOSPITAL | Age: 70
Discharge: HOME | End: 2020-01-08
Attending: INTERNAL MEDICINE | Admitting: INTERNAL MEDICINE
Payer: MEDICARE

## 2020-01-08 VITALS
SYSTOLIC BLOOD PRESSURE: 130 MMHG | DIASTOLIC BLOOD PRESSURE: 68 MMHG | BODY MASS INDEX: 20.66 KG/M2 | HEART RATE: 75 BPM | TEMPERATURE: 98 F | RESPIRATION RATE: 16 BRPM | HEIGHT: 64 IN | WEIGHT: 121 LBS | OXYGEN SATURATION: 98 %

## 2020-01-08 DIAGNOSIS — Z12.11 SCREEN FOR COLON CANCER: ICD-10-CM

## 2020-01-08 PROCEDURE — D9220A PRA ANESTHESIA: ICD-10-PCS | Mod: CRNA,,, | Performed by: NURSE ANESTHETIST, CERTIFIED REGISTERED

## 2020-01-08 PROCEDURE — G0121 COLON CA SCRN NOT HI RSK IND: ICD-10-PCS | Mod: ,,, | Performed by: INTERNAL MEDICINE

## 2020-01-08 PROCEDURE — D9220A PRA ANESTHESIA: Mod: CRNA,,, | Performed by: NURSE ANESTHETIST, CERTIFIED REGISTERED

## 2020-01-08 PROCEDURE — 63600175 PHARM REV CODE 636 W HCPCS: Mod: PO | Performed by: NURSE ANESTHETIST, CERTIFIED REGISTERED

## 2020-01-08 PROCEDURE — D9220A PRA ANESTHESIA: Mod: ANES,,, | Performed by: ANESTHESIOLOGY

## 2020-01-08 PROCEDURE — G0121 COLON CA SCRN NOT HI RSK IND: HCPCS | Mod: ,,, | Performed by: INTERNAL MEDICINE

## 2020-01-08 PROCEDURE — 63600175 PHARM REV CODE 636 W HCPCS: Mod: PO | Performed by: INTERNAL MEDICINE

## 2020-01-08 PROCEDURE — D9220A PRA ANESTHESIA: ICD-10-PCS | Mod: ANES,,, | Performed by: ANESTHESIOLOGY

## 2020-01-08 PROCEDURE — 37000009 HC ANESTHESIA EA ADD 15 MINS: Mod: PO | Performed by: INTERNAL MEDICINE

## 2020-01-08 PROCEDURE — 37000008 HC ANESTHESIA 1ST 15 MINUTES: Mod: PO | Performed by: INTERNAL MEDICINE

## 2020-01-08 PROCEDURE — G0121 COLON CA SCRN NOT HI RSK IND: HCPCS | Mod: PO | Performed by: INTERNAL MEDICINE

## 2020-01-08 RX ORDER — PROPOFOL 10 MG/ML
VIAL (ML) INTRAVENOUS
Status: DISCONTINUED | OUTPATIENT
Start: 2020-01-08 | End: 2020-01-08

## 2020-01-08 RX ORDER — SODIUM CHLORIDE 0.9 % (FLUSH) 0.9 %
10 SYRINGE (ML) INJECTION
Status: DISCONTINUED | OUTPATIENT
Start: 2020-01-08 | End: 2020-01-08 | Stop reason: HOSPADM

## 2020-01-08 RX ORDER — SODIUM CHLORIDE, SODIUM LACTATE, POTASSIUM CHLORIDE, CALCIUM CHLORIDE 600; 310; 30; 20 MG/100ML; MG/100ML; MG/100ML; MG/100ML
INJECTION, SOLUTION INTRAVENOUS CONTINUOUS
Status: DISCONTINUED | OUTPATIENT
Start: 2020-01-08 | End: 2020-01-08 | Stop reason: HOSPADM

## 2020-01-08 RX ORDER — LIDOCAINE HCL/PF 100 MG/5ML
SYRINGE (ML) INTRAVENOUS
Status: DISCONTINUED | OUTPATIENT
Start: 2020-01-08 | End: 2020-01-08

## 2020-01-08 RX ADMIN — PROPOFOL 50 MG: 10 INJECTION, EMULSION INTRAVENOUS at 07:01

## 2020-01-08 RX ADMIN — PROPOFOL 125 MG: 10 INJECTION, EMULSION INTRAVENOUS at 07:01

## 2020-01-08 RX ADMIN — SODIUM CHLORIDE, SODIUM LACTATE, POTASSIUM CHLORIDE, AND CALCIUM CHLORIDE: .6; .31; .03; .02 INJECTION, SOLUTION INTRAVENOUS at 07:01

## 2020-01-08 RX ADMIN — LIDOCAINE HYDROCHLORIDE 100 MG: 20 INJECTION, SOLUTION INTRAVENOUS at 07:01

## 2020-01-08 NOTE — H&P
History & Physical - Short Stay  Gastroenterology      SUBJECTIVE:     Procedure: Colonoscopy    Chief Complaint/Indication for Procedure: Screening    PTA Medications   Medication Sig    albuterol (PROAIR HFA) 90 mcg/actuation inhaler Inhale 2 puffs into the lungs every 4 (four) hours as needed. Rescue    aspirin (ECOTRIN) 81 MG EC tablet Take 81 mg by mouth once daily.    calcium-vitamin D3 (CALCIUM 500 + D) 500 mg(1,250mg) -200 unit per tablet Take 1 tablet by mouth 2 (two) times daily with meals.    lisinopril (PRINIVIL,ZESTRIL) 30 MG tablet Take 1 tablet (30 mg total) by mouth once daily.    multivitamin capsule Take 1 capsule by mouth once daily.    alendronate (FOSAMAX) 70 MG tablet Take 1 tablet (70 mg total) by mouth every 7 days. (Patient not taking: Reported on 1/7/2020)    lisinopril (PRINIVIL,ZESTRIL) 30 MG tablet Take 1 tablet (30 mg total) by mouth once daily.    meloxicam (MOBIC) 7.5 MG tablet Take 1 tablet (7.5 mg total) by mouth daily as needed for Pain. (Patient not taking: Reported on 10/18/2019)    timolol maleate 0.5% (TIMOPTIC) 0.5 % Drop Place 1 drop into both eyes 2 (two) times daily. (Patient not taking: Reported on 1/7/2020)       Review of patient's allergies indicates:   Allergen Reactions    Penicillins      Other reaction(s): Unknown        Past Medical History:   Diagnosis Date    Arthritis     Asthma     Basal cell carcinoma     Cataract     Hyperlipidemia     Hypertension     Insomnia      Past Surgical History:   Procedure Laterality Date    BACK SURGERY      CATARACT EXTRACTION W/  INTRAOCULAR LENS IMPLANT Right 08/22/2019    +12.5    EYE SURGERY      cataract ou    NECK SURGERY  2000    corpectomy    SPINAL FUSION      TONSILLECTOMY      tonsils       Family History   Problem Relation Age of Onset    Hypertension Mother     Macular degeneration Mother     Hypertension Father     Cancer Father         prostate    Pancreatic cancer Maternal Grandmother      Heart failure Maternal Grandfather     Dementia Paternal Grandmother     Breast cancer Maternal Aunt      Social History     Tobacco Use    Smoking status: Never Smoker    Smokeless tobacco: Never Used   Substance Use Topics    Alcohol use: Yes     Alcohol/week: 2.0 standard drinks     Types: 2 Glasses of wine per week     Comment: a day    Drug use: No         OBJECTIVE:     Vital Signs (Most Recent)       Physical Exam                                                        GENERAL:  Comfortable, in no acute distress.                                 HEENT EXAM:  Nonicteric.  No adenopathy.  Oropharynx is clear.               NECK:  Supple.                                                               LUNGS:  Clear.                                                               CARDIAC:  Regular rate and rhythm.  S1, S2.  No murmur.                      ABDOMEN:  Soft, positive bowel sounds, nontender.  No hepatosplenomegaly or masses.  No rebound or guarding.                                             EXTREMITIES:  No edema.     MENTAL STATUS:  Normal, alert and oriented.      ASSESSMENT/PLAN:     Assessment: Colorectal cancer screening    Plan: Colonoscopy    Anesthesia Plan: General    ASA Grade: ASA 2 - Patient with mild systemic disease with no functional limitations    MALLAMPATI SCORE:  I (soft palate, uvula, fauces, and tonsillar pillars visible)

## 2020-01-08 NOTE — ANESTHESIA POSTPROCEDURE EVALUATION
Anesthesia Post Evaluation    Patient: Cindy Ramirez    Procedure(s) Performed: Procedure(s) (LRB):  COLONOSCOPY (N/A)    Final Anesthesia Type: general    Patient location during evaluation: PACU  Patient participation: Yes- Able to Participate  Level of consciousness: awake and alert and oriented  Post-procedure vital signs: reviewed and stable  Pain management: adequate  Airway patency: patent    PONV status at discharge: No PONV  Anesthetic complications: no      Cardiovascular status: blood pressure returned to baseline  Respiratory status: unassisted, spontaneous ventilation and room air  Hydration status: euvolemic  Follow-up not needed.          Vitals Value Taken Time   /70 1/8/2020  8:09 AM   Temp 36.6 °C (97.8 °F) 1/8/2020  7:54 AM   Pulse 76 1/8/2020  8:09 AM   Resp 16 1/8/2020  8:09 AM   SpO2 98 % 1/8/2020  8:09 AM         No case tracking events are documented in the log.      Pain/Joselin Score: Joselin Score: 6 (1/8/2020  7:54 AM)

## 2020-01-08 NOTE — TRANSFER OF CARE
"Anesthesia Transfer of Care Note    Patient: Cindy Ramirez    Procedure(s) Performed: Procedure(s) (LRB):  COLONOSCOPY (N/A)    Patient location: PACU    Anesthesia Type: general    Transport from OR: Transported from OR on room air with adequate spontaneous ventilation    Post pain: adequate analgesia    Post assessment: no apparent anesthetic complications and tolerated procedure well    Post vital signs: stable    Level of consciousness: awake    Nausea/Vomiting: no nausea/vomiting    Complications: none    Transfer of care protocol was followed      Last vitals:   Visit Vitals  BP (!) 163/80 (BP Location: Right arm, Patient Position: Sitting)   Pulse (!) 50   Temp 36.3 °C (97.3 °F) (Skin)   Resp 16   Ht 5' 4" (1.626 m)   Wt 54.9 kg (121 lb)   SpO2 98%   Breastfeeding? No   BMI 20.77 kg/m²     "

## 2020-01-08 NOTE — [employer] PATIENT INSTRUCTIONS
Discharge Summary/Instructions after an Endoscopic Procedure  Patient Name: Cindy Ramirez  Patient MRN: [id number]  Patient YOB: 1950 Wednesday, January 08, 2020  Jameson Tovar MD  RESTRICTIONS:  During your procedure today, you received medications for sedation.  These   medications may affect your judgment, balance and coordination.  Therefore,   for 24 hours, you have the following restrictions:   - DO NOT drive a car, operate machinery, make legal/financial decisions,   sign important papers or drink alcohol.    ACTIVITY:  Today: no heavy lifting, straining or running due to procedural   sedation/anesthesia.  The following day: return to full activity including work.  DIET:  Eat and drink normally unless instructed otherwise.     TREATMENT FOR COMMON SIDE EFFECTS:  - Mild abdominal pain, nausea, belching, bloating or excessive gas:  rest,   eat lightly and use a heating pad.  - Sore Throat: treat with throat lozenges and/or gargle with warm salt   water.  - Because air was used during the procedure, expelling large amounts of air   from your rectum or belching is normal.  - If a bowel prep was taken, you may not have a bowel movement for 1-3 days.    This is normal.  SYMPTOMS TO WATCH FOR AND REPORT TO YOUR PHYSICIAN:  1. Abdominal pain or bloating, other than gas cramps.  2. Chest pain.  3. Back pain.  4. Signs of infection such as: chills or fever occurring within 24 hours   after the procedure.  5. Rectal bleeding, which would show as bright red, maroon, or black stools.   (A tablespoon of blood from the rectum is not serious, especially if   hemorrhoids are present.)  6. Vomiting.  7. Weakness or dizziness.  GO DIRECTLY TO THE NEAREST EMERGENCY ROOM IF YOU HAVE ANY OF THE FOLLOWING:      Difficulty breathing              Chills and/or fever over 101 F   Persistent vomiting and/or vomiting blood   Severe abdominal pain   Severe chest pain   Black, tarry stools   Bleeding- more than one  tablespoon   Any other symptom or condition that you feel may need urgent attention  Your doctor recommends these additional instructions:  If any biopsies were taken, your doctors clinic will contact you in 1 to 2   weeks with any results.  Your physician has recommended a repeat colonoscopy in 10 years for   screening purposes.   You are being discharged to home.  For questions, problems or results please call your physician - Jameson Tovar MD at Work:  [phone].  EMERGENCY PHONE NUMBER: [phone], LAB RESULTS: [phone]  IF A COMPLICATION OR EMERGENCY SITUATION ARISES AND YOU ARE UNABLE TO REACH   YOUR PHYSICIAN - GO DIRECTLY TO THE EMERGENCY ROOM.  ___________________________________________  Nurse Signature  ___________________________________________  Patient/Designated Responsible Party Signature  Jameson Tovar MD  1/8/2020 7:53:50 AM  This report has been verified and signed electronically.  [employer]

## 2020-01-08 NOTE — DISCHARGE SUMMARY
Discharge Note  Short Stay      SUMMARY     Admit Date: 1/8/2020    Attending Physician: Jameson Tovar MD     Discharge Physician: Jameson Tovar MD    Discharge Date: 1/8/2020 7:54 AM    Final Diagnosis: Colon cancer screening [Z12.11]    Disposition: HOME OR SELF CARE    Patient Instructions:   Current Discharge Medication List      CONTINUE these medications which have NOT CHANGED    Details   albuterol (PROAIR HFA) 90 mcg/actuation inhaler Inhale 2 puffs into the lungs every 4 (four) hours as needed. Rescue  Qty: 9 each, Refills: 2    Comments: Please consider 90 day supplies to promote better adherence  Associated Diagnoses: Moderate asthma without complication, unspecified whether persistent      aspirin (ECOTRIN) 81 MG EC tablet Take 81 mg by mouth once daily.      calcium-vitamin D3 (CALCIUM 500 + D) 500 mg(1,250mg) -200 unit per tablet Take 1 tablet by mouth 2 (two) times daily with meals.      !! lisinopril (PRINIVIL,ZESTRIL) 30 MG tablet Take 1 tablet (30 mg total) by mouth once daily.  Qty: 30 tablet, Refills: 0    Associated Diagnoses: Essential hypertension      multivitamin capsule Take 1 capsule by mouth once daily.      alendronate (FOSAMAX) 70 MG tablet Take 1 tablet (70 mg total) by mouth every 7 days.  Qty: 4 tablet, Refills: 11    Associated Diagnoses: Osteopenia with high risk of fracture      !! lisinopril (PRINIVIL,ZESTRIL) 30 MG tablet Take 1 tablet (30 mg total) by mouth once daily.  Qty: 90 tablet, Refills: 3    Associated Diagnoses: Essential hypertension      meloxicam (MOBIC) 7.5 MG tablet Take 1 tablet (7.5 mg total) by mouth daily as needed for Pain.  Qty: 40 tablet, Refills: 1    Associated Diagnoses: Hip pain, unspecified laterality      timolol maleate 0.5% (TIMOPTIC) 0.5 % Drop Place 1 drop into both eyes 2 (two) times daily.  Qty: 10 mL, Refills: 6    Associated Diagnoses: Borderline steroid-induced glaucoma of both eyes       !! - Potential duplicate medications  found. Please discuss with provider.          Discharge Procedure Orders (must include Diet, Follow-up, Activity)    Follow Up:  Follow up with PCP as previously scheduled  Resume routine diet.  Activity as tolerated.    No driving day of procedure.

## 2020-01-08 NOTE — ANESTHESIA PREPROCEDURE EVALUATION
01/08/2020  Cindy Ramirez is a 69 y.o., female.    Anesthesia Evaluation         Review of Systems  Anesthesia Hx:  No problems with previous Anesthesia   Cardiovascular:   Exercise tolerance: good Hypertension hyperlipidemia    Pulmonary:   Asthma    Renal/:  Renal/ Normal     Hepatic/GI:   Bowel Prep.    Neurological:  Neurology Normal    Endocrine:  Endocrine Normal        Physical Exam  General:  Well nourished    Airway/Jaw/Neck:  Airway Findings: Mouth Opening: Normal Tongue: Normal  General Airway Assessment: Adult  Mallampati: II  TM Distance: Normal, at least 6 cm       Chest/Lungs:  Chest/Lungs Clear    Heart/Vascular:  Heart Findings: Normal            Anesthesia Plan  Type of Anesthesia, risks & benefits discussed:  Anesthesia Type:  general  Patient's Preference:   Intra-op Monitoring Plan: standard ASA monitors  Intra-op Monitoring Plan Comments:   Post Op Pain Control Plan: IV/PO Opioids PRN  Post Op Pain Control Plan Comments: Opioids and analgesic adjuvants as needed  Induction:   IV  Beta Blocker:  Patient is not currently on a Beta-Blocker (No further documentation required).       Informed Consent: Patient understands risks and agrees with Anesthesia plan.  Questions answered. Anesthesia consent signed with patient.  ASA Score: 2     Day of Surgery Review of History & Physical:    H&P update referred to the surgeon.     Anesthesia Plan Notes: I have personally evaluated the patient and discussed risk/benefits/alternatives of general anesthesia.        Ready For Surgery From Anesthesia Perspective.

## 2020-02-27 ENCOUNTER — NUTRITION (OUTPATIENT)
Dept: NUTRITION | Facility: CLINIC | Age: 70
End: 2020-02-27
Payer: COMMERCIAL

## 2020-02-27 DIAGNOSIS — Z71.9 HEALTH EDUCATION/COUNSELING: ICD-10-CM

## 2020-02-27 DIAGNOSIS — Z71.9 HEALTH EDUCATION/COUNSELING: Primary | ICD-10-CM

## 2020-02-27 LAB
ALBUMIN SERPL BCP-MCNC: 4.1 G/DL (ref 3.5–5.2)
ALP SERPL-CCNC: 62 U/L (ref 55–135)
ALT SERPL W/O P-5'-P-CCNC: 13 U/L (ref 10–44)
ANION GAP SERPL CALC-SCNC: 10 MMOL/L (ref 8–16)
AST SERPL-CCNC: 18 U/L (ref 10–40)
BILIRUB SERPL-MCNC: 0.4 MG/DL (ref 0.1–1)
BUN SERPL-MCNC: 12 MG/DL (ref 8–23)
CALCIUM SERPL-MCNC: 9.6 MG/DL (ref 8.7–10.5)
CHLORIDE SERPL-SCNC: 103 MMOL/L (ref 95–110)
CHOLEST SERPL-MCNC: 219 MG/DL (ref 120–199)
CHOLEST/HDLC SERPL: 2.3 {RATIO} (ref 2–5)
CO2 SERPL-SCNC: 28 MMOL/L (ref 23–29)
CREAT SERPL-MCNC: 0.8 MG/DL (ref 0.5–1.4)
CRP SERPL-MCNC: 1.74 MG/L (ref 0–3.19)
ERYTHROCYTE [DISTWIDTH] IN BLOOD BY AUTOMATED COUNT: 12.8 % (ref 11.5–14.5)
ERYTHROCYTE [SEDIMENTATION RATE] IN BLOOD BY WESTERGREN METHOD: 3 MM/HR (ref 0–20)
EST. GFR  (AFRICAN AMERICAN): >60 ML/MIN/1.73 M^2
EST. GFR  (NON AFRICAN AMERICAN): >60 ML/MIN/1.73 M^2
GGT SERPL-CCNC: 16 U/L (ref 8–55)
GLUCOSE SERPL-MCNC: 72 MG/DL (ref 70–110)
HCT VFR BLD AUTO: 45.8 % (ref 37–48.5)
HDLC SERPL-MCNC: 96 MG/DL (ref 40–75)
HDLC SERPL: 43.8 % (ref 20–50)
HGB BLD-MCNC: 14.9 G/DL (ref 12–16)
LDLC SERPL CALC-MCNC: 105 MG/DL (ref 63–159)
MCH RBC QN AUTO: 32.2 PG (ref 27–31)
MCHC RBC AUTO-ENTMCNC: 32.5 G/DL (ref 32–36)
MCV RBC AUTO: 99 FL (ref 82–98)
NONHDLC SERPL-MCNC: 123 MG/DL
PLATELET # BLD AUTO: 236 K/UL (ref 150–350)
PMV BLD AUTO: 10.7 FL (ref 9.2–12.9)
POTASSIUM SERPL-SCNC: 4.1 MMOL/L (ref 3.5–5.1)
PROT SERPL-MCNC: 7.5 G/DL (ref 6–8.4)
RBC # BLD AUTO: 4.63 M/UL (ref 4–5.4)
SODIUM SERPL-SCNC: 141 MMOL/L (ref 136–145)
TRIGL SERPL-MCNC: 90 MG/DL (ref 30–150)
VIT B12 SERPL-MCNC: 711 PG/ML (ref 210–950)
WBC # BLD AUTO: 6.66 K/UL (ref 3.9–12.7)

## 2020-02-27 PROCEDURE — 36415 COLL VENOUS BLD VENIPUNCTURE: CPT

## 2020-02-28 LAB — FOLATE SERPL-MCNC: 30.9 NG/ML (ref 4–24)

## 2020-07-17 DIAGNOSIS — Z71.89 COMPLEX CARE COORDINATION: ICD-10-CM

## 2020-10-01 ENCOUNTER — PATIENT MESSAGE (OUTPATIENT)
Dept: OTHER | Facility: OTHER | Age: 70
End: 2020-10-01

## 2020-10-06 DIAGNOSIS — I10 HYPERTENSION, UNSPECIFIED TYPE: Primary | ICD-10-CM

## 2020-10-20 ENCOUNTER — OFFICE VISIT (OUTPATIENT)
Dept: OPHTHALMOLOGY | Facility: CLINIC | Age: 70
End: 2020-10-20
Payer: MEDICARE

## 2020-10-20 DIAGNOSIS — Z96.1 PSEUDOPHAKIA OF BOTH EYES: Primary | ICD-10-CM

## 2020-10-20 DIAGNOSIS — H52.4 BILATERAL PRESBYOPIA: ICD-10-CM

## 2020-10-20 DIAGNOSIS — Z83.518 FAMILY HISTORY OF MACULAR DEGENERATION: ICD-10-CM

## 2020-10-20 PROCEDURE — 92014 PR EYE EXAM, EST PATIENT,COMPREHESV: ICD-10-PCS | Mod: S$PBB,,, | Performed by: OPTOMETRIST

## 2020-10-20 PROCEDURE — 99999 PR PBB SHADOW E&M-EST. PATIENT-LVL III: CPT | Mod: PBBFAC,,, | Performed by: OPTOMETRIST

## 2020-10-20 PROCEDURE — 99999 PR PBB SHADOW E&M-EST. PATIENT-LVL III: ICD-10-PCS | Mod: PBBFAC,,, | Performed by: OPTOMETRIST

## 2020-10-20 PROCEDURE — 99213 OFFICE O/P EST LOW 20 MIN: CPT | Mod: PBBFAC | Performed by: OPTOMETRIST

## 2020-10-20 PROCEDURE — 92015 DETERMINE REFRACTIVE STATE: CPT | Mod: ,,, | Performed by: OPTOMETRIST

## 2020-10-20 PROCEDURE — 92015 PR REFRACTION: ICD-10-PCS | Mod: ,,, | Performed by: OPTOMETRIST

## 2020-10-20 PROCEDURE — 92014 COMPRE OPH EXAM EST PT 1/>: CPT | Mod: S$PBB,,, | Performed by: OPTOMETRIST

## 2020-10-20 NOTE — PROGRESS NOTES
HPI     10 month eye check up  H/O SCL WEAR 60 YEARS  Phaco OD +12.5 SN60WF / CDE: 9.00 / 8/22/19   Phaco OS                                 9/12/2019  Pt uses +2.50 readers    Last edited by Steven Alvarez, OD on 10/20/2020  8:47 AM. (History)            Assessment /Plan     For exam results, see Encounter Report.    Pseudophakia of both eyes    Family history of macular degeneration    Bilateral presbyopia      Stable IOL OU.    Mom has mac degen. Pt takes multivits.    Dispense Final Rx for glasses or may use OTC glasses.  RTC 1 year  Discussed above and answered questions.

## 2020-10-24 ENCOUNTER — PATIENT MESSAGE (OUTPATIENT)
Dept: PRIMARY CARE CLINIC | Facility: CLINIC | Age: 70
End: 2020-10-24

## 2020-11-03 ENCOUNTER — TELEPHONE (OUTPATIENT)
Dept: PRIMARY CARE CLINIC | Facility: CLINIC | Age: 70
End: 2020-11-03

## 2020-11-06 ENCOUNTER — PATIENT MESSAGE (OUTPATIENT)
Dept: PRIMARY CARE CLINIC | Facility: CLINIC | Age: 70
End: 2020-11-06

## 2020-11-27 ENCOUNTER — PATIENT MESSAGE (OUTPATIENT)
Dept: PRIMARY CARE CLINIC | Facility: CLINIC | Age: 70
End: 2020-11-27

## 2020-11-27 ENCOUNTER — TELEPHONE (OUTPATIENT)
Dept: PRIMARY CARE CLINIC | Facility: CLINIC | Age: 70
End: 2020-11-27

## 2020-11-27 NOTE — TELEPHONE ENCOUNTER
Pt is requesting a call back from the nurse because she is having ear pain and wants to know if something can be sent to the pharmacy or if she needs to come in

## 2020-11-27 NOTE — TELEPHONE ENCOUNTER
----- Message from Corinne Mcgowan sent at 11/27/2020  8:44 AM CST -----  Contact: Cindy glover [phone]  Type:  Needs Medical Advice    Who Called: Cindy belen  Symptoms (please be specific):   How long has patient had these symptoms:   Pharmacy name and phone #:    Would the patient rather a call back or a response via [facility]? Call back  Best Call Back Number: [phone]  Additional Information: Pt is requesting a call back from the nurse because she is having ear pain and wants to know if something can be sent to the pharmacy or if she needs to come in

## 2020-11-28 ENCOUNTER — OFFICE VISIT (OUTPATIENT)
Dept: URGENT CARE | Facility: CLINIC | Age: 70
End: 2020-11-28
Payer: MEDICARE

## 2020-11-28 VITALS
SYSTOLIC BLOOD PRESSURE: 134 MMHG | HEART RATE: 82 BPM | RESPIRATION RATE: 18 BRPM | TEMPERATURE: 98 F | OXYGEN SATURATION: 98 % | DIASTOLIC BLOOD PRESSURE: 56 MMHG

## 2020-11-28 DIAGNOSIS — H60.392 OTHER INFECTIVE ACUTE OTITIS EXTERNA OF LEFT EAR: Primary | ICD-10-CM

## 2020-11-28 DIAGNOSIS — T16.2XXA FOREIGN BODY OF LEFT EAR, INITIAL ENCOUNTER: ICD-10-CM

## 2020-11-28 PROCEDURE — 99214 PR OFFICE/OUTPT VISIT, EST, LEVL IV, 30-39 MIN: ICD-10-PCS | Mod: S$GLB,,, | Performed by: PHYSICIAN ASSISTANT

## 2020-11-28 PROCEDURE — 99214 OFFICE O/P EST MOD 30 MIN: CPT | Mod: S$GLB,,, | Performed by: PHYSICIAN ASSISTANT

## 2020-11-28 RX ORDER — CIPROFLOXACIN AND DEXAMETHASONE 3; 1 MG/ML; MG/ML
4 SUSPENSION/ DROPS AURICULAR (OTIC) 2 TIMES DAILY
Qty: 7.5 ML | Refills: 0 | Status: SHIPPED | OUTPATIENT
Start: 2020-11-28 | End: 2020-12-08

## 2020-11-28 NOTE — PROGRESS NOTES
Subjective:       Patient ID: Cindy Ramirez is a 70 y.o. female.    Vitals:  tympanic temperature is 97.9 °F (36.6 °C). Her blood pressure is 134/56 (abnormal) and her pulse is 82. Her respiration is 18 and oxygen saturation is 98%.     Chief Complaint: Otalgia (left)    Possible piece of hearing aid in left ear. Pt reports pain x 5 days. She denies fever/chills, drainage from ear, decreased hearing.    Otalgia   There is pain in the left ear. The current episode started in the past 7 days (11/24/2020). The problem occurs hourly. The problem has been gradually worsening. There has been no fever. The pain is at a severity of 7/10. The pain is mild. Associated symptoms include coughing. Pertinent negatives include no diarrhea, headaches, rash, sore throat or vomiting. Treatments tried: naproxen. The treatment provided mild relief. Her past medical history is significant for hearing loss.       Constitution: Negative for chills, fatigue and fever.   HENT: Positive for ear pain. Negative for congestion and sore throat.    Neck: Negative for painful lymph nodes.   Cardiovascular: Negative for chest pain and leg swelling.   Eyes: Negative for double vision and blurred vision.   Respiratory: Positive for cough. Negative for shortness of breath.    Gastrointestinal: Negative for nausea, vomiting and diarrhea.   Genitourinary: Negative for dysuria, frequency, urgency and history of kidney stones.   Musculoskeletal: Negative for joint pain, joint swelling, muscle cramps and muscle ache.   Skin: Negative for color change, pale, rash, erythema and bruising.   Allergic/Immunologic: Negative for seasonal allergies.   Neurological: Negative for dizziness, history of vertigo, light-headedness, passing out and headaches.   Hematologic/Lymphatic: Negative for swollen lymph nodes.   Psychiatric/Behavioral: Negative for nervous/anxious, sleep disturbance and depression. The patient is not nervous/anxious.        Objective:       Physical Exam   Constitutional: She is oriented to person, place, and time. She appears well-developed.   HENT:   Head: Normocephalic and atraumatic. Head is without abrasion, without contusion and without laceration.   Ears:   Right Ear: External ear normal.   Left Ear: External ear normal.   Ears:    Nose: Nose normal.   Mouth/Throat: Oropharynx is clear and moist and mucous membranes are normal.   Eyes: Pupils are equal, round, and reactive to light. Conjunctivae, EOM and lids are normal.   Neck: Trachea normal, full passive range of motion without pain and phonation normal. Neck supple.   Cardiovascular: Normal rate, regular rhythm and normal heart sounds.   Pulmonary/Chest: Effort normal and breath sounds normal. No stridor. No respiratory distress.   Musculoskeletal: Normal range of motion.   Neurological: She is alert and oriented to person, place, and time.   Skin: Skin is warm, dry, intact and no rash. Capillary refill takes less than 2 seconds. abrasion, burn, bruising, erythema and ecchymosisPsychiatric: Her speech is normal and behavior is normal. Judgment and thought content normal.   Nursing note and vitals reviewed.        Assessment:       1. Other infective acute otitis externa of left ear    2. Foreign body of left ear, initial encounter        Plan:       Recommend return to clinic or follow up with PCP if no improvement using Ciprodex drops.  Avoid using hearing aid in left ear until infection is clear.    Other infective acute otitis externa of left ear  -     ciprofloxacin-dexamethasone 0.3-0.1% (CIPRODEX) 0.3-0.1 % DrpS; Place 4 drops into the left ear 2 (two) times daily. for 10 days  Dispense: 7.5 mL; Refill: 0    Foreign body of left ear, initial encounter  -     ciprofloxacin-dexamethasone 0.3-0.1% (CIPRODEX) 0.3-0.1 % DrpS; Place 4 drops into the left ear 2 (two) times daily. for 10 days  Dispense: 7.5 mL; Refill: 0      Patient Instructions     External Ear Infection  (Adult)    External otitis (also called swimmers ear) is an infection in the ear canal. It is often caused by bacteria or fungus. It can occur a few days after water gets trapped in the ear canal (from swimming or bathing). It can also occur after cleaning too deeply in the ear canal with a cotton swab or other object. Sometimes, hair care products get into the ear canal and cause this problem.  Symptoms can include pain, fever, itching, redness, drainage, or swelling of the ear canal. Temporary hearing loss may also occur.  Home care  · Do not try to clean the ear canal. This can push pus and bacteria deeper into the canal.  · Use prescribed ear drops as directed. These help reduce swelling and fight the infection. If an ear wick was placed in the ear canal, apply drops right onto the end of the wick. The wick will draw the medication into the ear canal even if it is swollen closed.  · A cotton ball may be loosely placed in the outer ear to absorb any drainage.  · You may use acetaminophen or ibuprofen to control pain, unless another medication was prescribed. Note: If you have chronic liver or kidney disease or ever had a stomach ulcer or GI bleeding, talk to your health care provider before taking any of these medications.  · Do not allow water to get into your ear when bathing. Also, avoid swimming until the infection has cleared.  Prevention  · Keep your ears dry. This helps lower the risk of infection. Dry your ears with a towel or hair dryer after getting wet. Also, use ear plugs when swimming.  · Do not stick any objects in the ear to remove wax.  · If you feel water trapped in your ear, use ear drops right away. You can get these drops over the counter at most drugstores. They work by removing water from the ear canal.  Follow-up care  Follow up with your health care provider in one week, or as advised.  When to seek medical advice  Call your health care provider right away if any of these occur:  · Ear  pain becomes worse or doesnt improve after 3 days of treatment  · Redness or swelling of the outer ear occurs or gets worse  · Headache  · Painful or stiff neck  · Drowsiness or confusion  · Fever of 100.4ºF (38ºC) or higher, or as directed by your health care provider  · Seizure  Date Last Reviewed: 3/22/2015  © 6777-6944 [employer]. [address] [place] [address]. All rights reserved. This information is not intended as a substitute for professional medical care. Always follow your healthcare professional's instructions.

## 2020-11-28 NOTE — PATIENT INSTRUCTIONS

## 2020-11-30 NOTE — TELEPHONE ENCOUNTER
Attempted to contact patient regarding provider message and recommendation, no answer.  Patient went to urgent care over the weekend.

## 2020-12-11 ENCOUNTER — PATIENT MESSAGE (OUTPATIENT)
Dept: OTHER | Facility: OTHER | Age: 70
End: 2020-12-11

## 2021-02-04 ENCOUNTER — OFFICE VISIT (OUTPATIENT)
Dept: PRIMARY CARE CLINIC | Facility: CLINIC | Age: 71
End: 2021-02-04
Payer: MEDICARE

## 2021-02-04 VITALS
WEIGHT: 124.13 LBS | TEMPERATURE: 97 F | HEIGHT: 64 IN | HEART RATE: 59 BPM | OXYGEN SATURATION: 98 % | DIASTOLIC BLOOD PRESSURE: 78 MMHG | BODY MASS INDEX: 21.19 KG/M2 | SYSTOLIC BLOOD PRESSURE: 124 MMHG

## 2021-02-04 DIAGNOSIS — R79.9 ABNORMAL FINDING OF BLOOD CHEMISTRY, UNSPECIFIED: ICD-10-CM

## 2021-02-04 DIAGNOSIS — J45.909 MODERATE ASTHMA WITHOUT COMPLICATION, UNSPECIFIED WHETHER PERSISTENT: ICD-10-CM

## 2021-02-04 DIAGNOSIS — E78.5 HYPERLIPIDEMIA, UNSPECIFIED HYPERLIPIDEMIA TYPE: Primary | ICD-10-CM

## 2021-02-04 DIAGNOSIS — Z12.31 OTHER SCREENING MAMMOGRAM: ICD-10-CM

## 2021-02-04 DIAGNOSIS — L53.9 REDNESS OF SKIN: ICD-10-CM

## 2021-02-04 DIAGNOSIS — Z98.1 ARTHRODESIS STATUS: ICD-10-CM

## 2021-02-04 DIAGNOSIS — I10 ESSENTIAL HYPERTENSION: ICD-10-CM

## 2021-02-04 DIAGNOSIS — M85.80 OSTEOPENIA WITH HIGH RISK OF FRACTURE: ICD-10-CM

## 2021-02-04 DIAGNOSIS — M43.22 FUSION OF SPINE OF CERVICAL REGION: ICD-10-CM

## 2021-02-04 DIAGNOSIS — M54.2 NECK PAIN: ICD-10-CM

## 2021-02-04 PROCEDURE — 99999 PR PBB SHADOW E&M-EST. PATIENT-LVL V: ICD-10-PCS | Mod: PBBFAC,,, | Performed by: FAMILY MEDICINE

## 2021-02-04 PROCEDURE — 99215 OFFICE O/P EST HI 40 MIN: CPT | Mod: S$PBB,,, | Performed by: FAMILY MEDICINE

## 2021-02-04 PROCEDURE — 99215 PR OFFICE/OUTPT VISIT, EST, LEVL V, 40-54 MIN: ICD-10-PCS | Mod: S$PBB,,, | Performed by: FAMILY MEDICINE

## 2021-02-04 PROCEDURE — 99999 PR PBB SHADOW E&M-EST. PATIENT-LVL V: CPT | Mod: PBBFAC,,, | Performed by: FAMILY MEDICINE

## 2021-02-04 PROCEDURE — 99215 OFFICE O/P EST HI 40 MIN: CPT | Mod: PBBFAC,PN | Performed by: FAMILY MEDICINE

## 2021-02-04 RX ORDER — ALENDRONATE SODIUM 70 MG/1
70 TABLET ORAL
Qty: 4 TABLET | Refills: 11 | Status: SHIPPED | OUTPATIENT
Start: 2021-02-04 | End: 2022-03-31

## 2021-02-04 RX ORDER — ALENDRONATE SODIUM 70 MG/1
70 TABLET ORAL
Qty: 4 TABLET | Refills: 11 | Status: SHIPPED | OUTPATIENT
Start: 2021-02-04 | End: 2021-02-04 | Stop reason: SDUPTHER

## 2021-02-04 RX ORDER — ALBUTEROL SULFATE 90 UG/1
2 AEROSOL, METERED RESPIRATORY (INHALATION) EVERY 4 HOURS PRN
Qty: 54 G | Refills: 3 | Status: SHIPPED | OUTPATIENT
Start: 2021-02-04 | End: 2022-02-09 | Stop reason: SDUPTHER

## 2021-02-04 RX ORDER — LISINOPRIL 30 MG/1
30 TABLET ORAL DAILY
Qty: 90 TABLET | Refills: 3 | Status: SHIPPED | OUTPATIENT
Start: 2021-02-04 | End: 2022-03-31 | Stop reason: SDUPTHER

## 2021-02-09 ENCOUNTER — LAB VISIT (OUTPATIENT)
Dept: LAB | Facility: HOSPITAL | Age: 71
End: 2021-02-09
Attending: FAMILY MEDICINE
Payer: MEDICARE

## 2021-02-09 DIAGNOSIS — E78.5 HYPERLIPIDEMIA, UNSPECIFIED HYPERLIPIDEMIA TYPE: ICD-10-CM

## 2021-02-09 DIAGNOSIS — R79.9 ABNORMAL FINDING OF BLOOD CHEMISTRY, UNSPECIFIED: ICD-10-CM

## 2021-02-09 LAB
ALBUMIN SERPL BCP-MCNC: 3.6 G/DL (ref 3.5–5.2)
ALP SERPL-CCNC: 70 U/L (ref 55–135)
ALT SERPL W/O P-5'-P-CCNC: 12 U/L (ref 10–44)
ANION GAP SERPL CALC-SCNC: 10 MMOL/L (ref 8–16)
AST SERPL-CCNC: 19 U/L (ref 10–40)
BASOPHILS # BLD AUTO: 0.06 K/UL (ref 0–0.2)
BASOPHILS NFR BLD: 1.4 % (ref 0–1.9)
BILIRUB SERPL-MCNC: 0.4 MG/DL (ref 0.1–1)
BUN SERPL-MCNC: 12 MG/DL (ref 8–23)
CALCIUM SERPL-MCNC: 8.9 MG/DL (ref 8.7–10.5)
CHLORIDE SERPL-SCNC: 106 MMOL/L (ref 95–110)
CHOLEST SERPL-MCNC: 178 MG/DL (ref 120–199)
CHOLEST/HDLC SERPL: 2.4 {RATIO} (ref 2–5)
CO2 SERPL-SCNC: 25 MMOL/L (ref 23–29)
CREAT SERPL-MCNC: 0.8 MG/DL (ref 0.5–1.4)
DIFFERENTIAL METHOD: ABNORMAL
EOSINOPHIL # BLD AUTO: 0.2 K/UL (ref 0–0.5)
EOSINOPHIL NFR BLD: 3.7 % (ref 0–8)
ERYTHROCYTE [DISTWIDTH] IN BLOOD BY AUTOMATED COUNT: 12.6 % (ref 11.5–14.5)
EST. GFR  (AFRICAN AMERICAN): >60 ML/MIN/1.73 M^2
EST. GFR  (NON AFRICAN AMERICAN): >60 ML/MIN/1.73 M^2
GLUCOSE SERPL-MCNC: 69 MG/DL (ref 70–110)
HCT VFR BLD AUTO: 45.5 % (ref 37–48.5)
HDLC SERPL-MCNC: 74 MG/DL (ref 40–75)
HDLC SERPL: 41.6 % (ref 20–50)
HGB BLD-MCNC: 14.6 G/DL (ref 12–16)
IMM GRANULOCYTES # BLD AUTO: 0.02 K/UL (ref 0–0.04)
IMM GRANULOCYTES NFR BLD AUTO: 0.5 % (ref 0–0.5)
LDLC SERPL CALC-MCNC: 69.8 MG/DL (ref 63–159)
LYMPHOCYTES # BLD AUTO: 1.5 K/UL (ref 1–4.8)
LYMPHOCYTES NFR BLD: 35 % (ref 18–48)
MCH RBC QN AUTO: 32.2 PG (ref 27–31)
MCHC RBC AUTO-ENTMCNC: 32.1 G/DL (ref 32–36)
MCV RBC AUTO: 100 FL (ref 82–98)
MONOCYTES # BLD AUTO: 0.4 K/UL (ref 0.3–1)
MONOCYTES NFR BLD: 9.3 % (ref 4–15)
NEUTROPHILS # BLD AUTO: 2.2 K/UL (ref 1.8–7.7)
NEUTROPHILS NFR BLD: 50.1 % (ref 38–73)
NONHDLC SERPL-MCNC: 104 MG/DL
NRBC BLD-RTO: 0 /100 WBC
PLATELET # BLD AUTO: 235 K/UL (ref 150–350)
PMV BLD AUTO: 10.3 FL (ref 9.2–12.9)
POTASSIUM SERPL-SCNC: 4.2 MMOL/L (ref 3.5–5.1)
PROT SERPL-MCNC: 6.9 G/DL (ref 6–8.4)
RBC # BLD AUTO: 4.54 M/UL (ref 4–5.4)
SODIUM SERPL-SCNC: 141 MMOL/L (ref 136–145)
TRIGL SERPL-MCNC: 171 MG/DL (ref 30–150)
TSH SERPL DL<=0.005 MIU/L-ACNC: 1.94 UIU/ML (ref 0.4–4)
WBC # BLD AUTO: 4.31 K/UL (ref 3.9–12.7)

## 2021-02-09 PROCEDURE — 36415 COLL VENOUS BLD VENIPUNCTURE: CPT | Mod: PN

## 2021-02-09 PROCEDURE — 80061 LIPID PANEL: CPT

## 2021-02-09 PROCEDURE — 84443 ASSAY THYROID STIM HORMONE: CPT

## 2021-02-09 PROCEDURE — 83036 HEMOGLOBIN GLYCOSYLATED A1C: CPT

## 2021-02-09 PROCEDURE — 85025 COMPLETE CBC W/AUTO DIFF WBC: CPT

## 2021-02-09 PROCEDURE — 80053 COMPREHEN METABOLIC PANEL: CPT

## 2021-02-10 LAB
ESTIMATED AVG GLUCOSE: 97 MG/DL (ref 68–131)
HBA1C MFR BLD: 5 % (ref 4–5.6)

## 2021-02-11 ENCOUNTER — PATIENT MESSAGE (OUTPATIENT)
Dept: PRIMARY CARE CLINIC | Facility: CLINIC | Age: 71
End: 2021-02-11

## 2021-03-02 ENCOUNTER — HOSPITAL ENCOUNTER (OUTPATIENT)
Dept: RADIOLOGY | Facility: HOSPITAL | Age: 71
Discharge: HOME | End: 2021-03-02
Attending: FAMILY MEDICINE
Payer: MEDICARE

## 2021-03-02 DIAGNOSIS — Z12.31 OTHER SCREENING MAMMOGRAM: ICD-10-CM

## 2021-03-02 DIAGNOSIS — Z12.31 SCREENING MAMMOGRAM, ENCOUNTER FOR: ICD-10-CM

## 2021-03-02 PROCEDURE — 77063 MAMMO DIGITAL SCREENING BILAT WITH TOMO: ICD-10-PCS | Mod: 26,,, | Performed by: RADIOLOGY

## 2021-03-02 PROCEDURE — 77067 SCR MAMMO BI INCL CAD: CPT | Mod: 26,,, | Performed by: RADIOLOGY

## 2021-03-02 PROCEDURE — 77067 SCR MAMMO BI INCL CAD: CPT | Mod: TC,PO

## 2021-03-02 PROCEDURE — 77067 MAMMO DIGITAL SCREENING BILAT WITH TOMO: ICD-10-PCS | Mod: 26,,, | Performed by: RADIOLOGY

## 2021-03-02 PROCEDURE — 77063 BREAST TOMOSYNTHESIS BI: CPT | Mod: 26,,, | Performed by: RADIOLOGY

## 2021-03-03 ENCOUNTER — PATIENT MESSAGE (OUTPATIENT)
Dept: PRIMARY CARE CLINIC | Facility: CLINIC | Age: 71
End: 2021-03-03

## 2021-03-03 DIAGNOSIS — M54.12 CERVICAL RADICULOPATHY: Primary | ICD-10-CM

## 2021-03-03 DIAGNOSIS — Z98.1 HISTORY OF FUSION OF CERVICAL SPINE: ICD-10-CM

## 2021-03-10 ENCOUNTER — IMMUNIZATION (OUTPATIENT)
Dept: FAMILY MEDICINE | Facility: CLINIC | Age: 71
End: 2021-03-10
Payer: MEDICARE

## 2021-03-10 DIAGNOSIS — Z23 NEED FOR VACCINATION: Primary | ICD-10-CM

## 2021-03-10 PROCEDURE — 91300 COVID-19, MRNA, LNP-S, PF, 30 MCG/0.3 ML DOSE VACCINE: ICD-10-PCS | Mod: ,,, | Performed by: FAMILY MEDICINE

## 2021-03-10 PROCEDURE — 0001A COVID-19, MRNA, LNP-S, PF, 30 MCG/0.3 ML DOSE VACCINE: ICD-10-PCS | Mod: CV19,,, | Performed by: FAMILY MEDICINE

## 2021-03-10 PROCEDURE — 0001A COVID-19, MRNA, LNP-S, PF, 30 MCG/0.3 ML DOSE VACCINE: CPT | Mod: CV19,,, | Performed by: FAMILY MEDICINE

## 2021-03-10 PROCEDURE — 91300 COVID-19, MRNA, LNP-S, PF, 30 MCG/0.3 ML DOSE VACCINE: CPT | Mod: ,,, | Performed by: FAMILY MEDICINE

## 2021-03-30 ENCOUNTER — OFFICE VISIT (OUTPATIENT)
Dept: NEUROSURGERY | Facility: CLINIC | Age: 71
End: 2021-03-30
Payer: MEDICARE

## 2021-03-30 ENCOUNTER — TELEPHONE (OUTPATIENT)
Dept: NEUROSURGERY | Facility: CLINIC | Age: 71
End: 2021-03-30

## 2021-03-30 VITALS — SYSTOLIC BLOOD PRESSURE: 149 MMHG | DIASTOLIC BLOOD PRESSURE: 89 MMHG | HEART RATE: 62 BPM

## 2021-03-30 DIAGNOSIS — M54.12 RADICULOPATHY, CERVICAL REGION: ICD-10-CM

## 2021-03-30 DIAGNOSIS — M54.12 CERVICAL RADICULOPATHY: ICD-10-CM

## 2021-03-30 DIAGNOSIS — M54.12 CERVICAL RADICULOPATHY: Primary | ICD-10-CM

## 2021-03-30 DIAGNOSIS — Z98.1 HISTORY OF FUSION OF CERVICAL SPINE: ICD-10-CM

## 2021-03-30 PROCEDURE — 99999 PR PBB SHADOW E&M-EST. PATIENT-LVL III: ICD-10-PCS | Mod: PBBFAC,,, | Performed by: NEUROLOGICAL SURGERY

## 2021-03-30 PROCEDURE — 99213 OFFICE O/P EST LOW 20 MIN: CPT | Mod: PBBFAC | Performed by: NEUROLOGICAL SURGERY

## 2021-03-30 PROCEDURE — 99204 OFFICE O/P NEW MOD 45 MIN: CPT | Mod: S$PBB,,, | Performed by: NEUROLOGICAL SURGERY

## 2021-03-30 PROCEDURE — 99204 PR OFFICE/OUTPT VISIT, NEW, LEVL IV, 45-59 MIN: ICD-10-PCS | Mod: S$PBB,,, | Performed by: NEUROLOGICAL SURGERY

## 2021-03-30 PROCEDURE — 99999 PR PBB SHADOW E&M-EST. PATIENT-LVL III: CPT | Mod: PBBFAC,,, | Performed by: NEUROLOGICAL SURGERY

## 2021-03-31 ENCOUNTER — IMMUNIZATION (OUTPATIENT)
Dept: FAMILY MEDICINE | Facility: CLINIC | Age: 71
End: 2021-03-31
Payer: MEDICARE

## 2021-03-31 DIAGNOSIS — Z23 NEED FOR VACCINATION: Primary | ICD-10-CM

## 2021-03-31 PROCEDURE — 91300 COVID-19, MRNA, LNP-S, PF, 30 MCG/0.3 ML DOSE VACCINE: CPT | Mod: ,,, | Performed by: INTERNAL MEDICINE

## 2021-03-31 PROCEDURE — 0002A COVID-19, MRNA, LNP-S, PF, 30 MCG/0.3 ML DOSE VACCINE: CPT | Mod: CV19,,, | Performed by: INTERNAL MEDICINE

## 2021-03-31 PROCEDURE — 91300 COVID-19, MRNA, LNP-S, PF, 30 MCG/0.3 ML DOSE VACCINE: ICD-10-PCS | Mod: ,,, | Performed by: INTERNAL MEDICINE

## 2021-03-31 PROCEDURE — 0002A COVID-19, MRNA, LNP-S, PF, 30 MCG/0.3 ML DOSE VACCINE: ICD-10-PCS | Mod: CV19,,, | Performed by: INTERNAL MEDICINE

## 2021-04-07 ENCOUNTER — TELEPHONE (OUTPATIENT)
Dept: NEUROSURGERY | Facility: CLINIC | Age: 71
End: 2021-04-07

## 2021-04-07 ENCOUNTER — PATIENT MESSAGE (OUTPATIENT)
Dept: PRIMARY CARE CLINIC | Facility: CLINIC | Age: 71
End: 2021-04-07

## 2021-04-07 DIAGNOSIS — M54.12 RADICULOPATHY, CERVICAL REGION: ICD-10-CM

## 2021-04-09 ENCOUNTER — PATIENT MESSAGE (OUTPATIENT)
Dept: PRIMARY CARE CLINIC | Facility: CLINIC | Age: 71
End: 2021-04-09

## 2021-04-16 ENCOUNTER — OFFICE VISIT (OUTPATIENT)
Dept: DERMATOLOGY | Facility: CLINIC | Age: 71
End: 2021-04-16
Payer: MEDICARE

## 2021-04-16 VITALS — WEIGHT: 124.13 LBS | RESPIRATION RATE: 18 BRPM | HEIGHT: 64 IN | BODY MASS INDEX: 21.19 KG/M2

## 2021-04-16 DIAGNOSIS — L57.0 ACTINIC KERATOSES: ICD-10-CM

## 2021-04-16 DIAGNOSIS — L71.9 ROSACEA: ICD-10-CM

## 2021-04-16 DIAGNOSIS — D48.9 NEOPLASM OF UNCERTAIN BEHAVIOR: Primary | ICD-10-CM

## 2021-04-16 PROCEDURE — 88312 SPECIAL STAINS GROUP 1: CPT | Performed by: PATHOLOGY

## 2021-04-16 PROCEDURE — 17000 PR DESTRUCTION(LASER SURGERY,CRYOSURGERY,CHEMOSURGERY),PREMALIGNANT LESIONS,FIRST LESION: ICD-10-PCS | Mod: S$PBB,59,, | Performed by: DERMATOLOGY

## 2021-04-16 PROCEDURE — 88305 TISSUE EXAM BY PATHOLOGIST: CPT | Mod: 26,,, | Performed by: PATHOLOGY

## 2021-04-16 PROCEDURE — 99999 PR PBB SHADOW E&M-EST. PATIENT-LVL III: CPT | Mod: PBBFAC,,, | Performed by: DERMATOLOGY

## 2021-04-16 PROCEDURE — 11102 PR TANGENTIAL BIOPSY, SKIN, SINGLE LESION: ICD-10-PCS | Mod: S$PBB,,, | Performed by: DERMATOLOGY

## 2021-04-16 PROCEDURE — 88312 SPECIAL STAINS GROUP 1: CPT | Mod: 26,,, | Performed by: PATHOLOGY

## 2021-04-16 PROCEDURE — 99202 OFFICE O/P NEW SF 15 MIN: CPT | Mod: 25,S$PBB,, | Performed by: DERMATOLOGY

## 2021-04-16 PROCEDURE — 88305 TISSUE EXAM BY PATHOLOGIST: CPT | Performed by: PATHOLOGY

## 2021-04-16 PROCEDURE — 17003 DESTRUCTION, PREMALIGNANT LESIONS; SECOND THROUGH 14 LESIONS: ICD-10-PCS | Mod: S$PBB,,, | Performed by: DERMATOLOGY

## 2021-04-16 PROCEDURE — 99202 PR OFFICE/OUTPT VISIT, NEW, LEVL II, 15-29 MIN: ICD-10-PCS | Mod: 25,S$PBB,, | Performed by: DERMATOLOGY

## 2021-04-16 PROCEDURE — 88312 PR  SPECIAL STAINS,GROUP I: ICD-10-PCS | Mod: 26,,, | Performed by: PATHOLOGY

## 2021-04-16 PROCEDURE — 17003 DESTRUCT PREMALG LES 2-14: CPT | Mod: S$PBB,,, | Performed by: DERMATOLOGY

## 2021-04-16 PROCEDURE — 11102 TANGNTL BX SKIN SINGLE LES: CPT | Mod: 59,PBBFAC,PO | Performed by: DERMATOLOGY

## 2021-04-16 PROCEDURE — 99999 PR PBB SHADOW E&M-EST. PATIENT-LVL III: ICD-10-PCS | Mod: PBBFAC,,, | Performed by: DERMATOLOGY

## 2021-04-16 PROCEDURE — 99213 OFFICE O/P EST LOW 20 MIN: CPT | Mod: PBBFAC,PO,25 | Performed by: DERMATOLOGY

## 2021-04-16 PROCEDURE — 17003 DESTRUCT PREMALG LES 2-14: CPT | Mod: PBBFAC,PO | Performed by: DERMATOLOGY

## 2021-04-16 PROCEDURE — 17000 DESTRUCT PREMALG LESION: CPT | Mod: PBBFAC,PO | Performed by: DERMATOLOGY

## 2021-04-16 PROCEDURE — 17000 DESTRUCT PREMALG LESION: CPT | Mod: S$PBB,59,, | Performed by: DERMATOLOGY

## 2021-04-16 PROCEDURE — 11102 TANGNTL BX SKIN SINGLE LES: CPT | Mod: S$PBB,,, | Performed by: DERMATOLOGY

## 2021-04-16 PROCEDURE — 88305 TISSUE EXAM BY PATHOLOGIST: ICD-10-PCS | Mod: 26,,, | Performed by: PATHOLOGY

## 2021-04-26 LAB
FINAL PATHOLOGIC DIAGNOSIS: NORMAL
GROSS: NORMAL
Lab: NORMAL
MICROSCOPIC EXAM: NORMAL

## 2021-04-27 ENCOUNTER — HOSPITAL ENCOUNTER (OUTPATIENT)
Dept: RADIOLOGY | Facility: HOSPITAL | Age: 71
Discharge: HOME | End: 2021-04-27
Attending: NEUROLOGICAL SURGERY
Payer: MEDICARE

## 2021-04-27 DIAGNOSIS — M54.12 RADICULOPATHY, CERVICAL REGION: ICD-10-CM

## 2021-04-27 PROCEDURE — 72050 XR CERVICAL SPINE AP LAT WITH FLEX EXTEN: ICD-10-PCS | Mod: 26,,, | Performed by: RADIOLOGY

## 2021-04-27 PROCEDURE — 72050 X-RAY EXAM NECK SPINE 4/5VWS: CPT | Mod: TC,FY,PO

## 2021-04-27 PROCEDURE — 72125 CT NECK SPINE W/O DYE: CPT | Mod: TC,PO

## 2021-04-27 PROCEDURE — 72050 X-RAY EXAM NECK SPINE 4/5VWS: CPT | Mod: 26,,, | Performed by: RADIOLOGY

## 2021-04-27 PROCEDURE — 72125 CT NECK SPINE W/O DYE: CPT | Mod: 26,,, | Performed by: RADIOLOGY

## 2021-04-27 PROCEDURE — 72125 CT CERVICAL SPINE WITHOUT CONTRAST: ICD-10-PCS | Mod: 26,,, | Performed by: RADIOLOGY

## 2021-05-03 ENCOUNTER — TELEPHONE (OUTPATIENT)
Dept: NEUROSURGERY | Facility: CLINIC | Age: 71
End: 2021-05-03

## 2021-05-03 ENCOUNTER — OFFICE VISIT (OUTPATIENT)
Dept: NEUROSURGERY | Facility: CLINIC | Age: 71
End: 2021-05-03
Payer: MEDICARE

## 2021-05-03 VITALS — SYSTOLIC BLOOD PRESSURE: 109 MMHG | HEART RATE: 71 BPM | DIASTOLIC BLOOD PRESSURE: 65 MMHG | TEMPERATURE: 98 F

## 2021-05-03 DIAGNOSIS — M54.2 NECK PAIN: Primary | ICD-10-CM

## 2021-05-03 DIAGNOSIS — M54.2 CERVICALGIA: ICD-10-CM

## 2021-05-03 DIAGNOSIS — M54.2 NECK PAIN: ICD-10-CM

## 2021-05-03 DIAGNOSIS — M54.12 CERVICAL RADICULOPATHY: Primary | ICD-10-CM

## 2021-05-03 PROCEDURE — 99999 PR PBB SHADOW E&M-EST. PATIENT-LVL III: CPT | Mod: PBBFAC,,, | Performed by: NEUROLOGICAL SURGERY

## 2021-05-03 PROCEDURE — 99999 PR PBB SHADOW E&M-EST. PATIENT-LVL III: ICD-10-PCS | Mod: PBBFAC,,, | Performed by: NEUROLOGICAL SURGERY

## 2021-05-03 PROCEDURE — 99213 OFFICE O/P EST LOW 20 MIN: CPT | Mod: PBBFAC | Performed by: NEUROLOGICAL SURGERY

## 2021-05-03 PROCEDURE — 99214 OFFICE O/P EST MOD 30 MIN: CPT | Mod: S$PBB,,, | Performed by: NEUROLOGICAL SURGERY

## 2021-05-03 PROCEDURE — 99214 PR OFFICE/OUTPT VISIT, EST, LEVL IV, 30-39 MIN: ICD-10-PCS | Mod: S$PBB,,, | Performed by: NEUROLOGICAL SURGERY

## 2021-06-06 ENCOUNTER — OFFICE VISIT (OUTPATIENT)
Dept: URGENT CARE | Facility: CLINIC | Age: 71
End: 2021-06-06
Payer: MEDICARE

## 2021-06-06 VITALS
OXYGEN SATURATION: 98 % | SYSTOLIC BLOOD PRESSURE: 146 MMHG | HEART RATE: 77 BPM | BODY MASS INDEX: 21.17 KG/M2 | HEIGHT: 64 IN | DIASTOLIC BLOOD PRESSURE: 84 MMHG | RESPIRATION RATE: 15 BRPM | WEIGHT: 124 LBS | TEMPERATURE: 97 F

## 2021-06-06 DIAGNOSIS — J45.901 EXACERBATION OF ASTHMA, UNSPECIFIED ASTHMA SEVERITY, UNSPECIFIED WHETHER PERSISTENT: Primary | ICD-10-CM

## 2021-06-06 DIAGNOSIS — R06.02 SHORTNESS OF BREATH: ICD-10-CM

## 2021-06-06 PROCEDURE — 99214 PR OFFICE/OUTPT VISIT, EST, LEVL IV, 30-39 MIN: ICD-10-PCS | Mod: S$GLB,,, | Performed by: PHYSICIAN ASSISTANT

## 2021-06-06 PROCEDURE — 71046 XR CHEST PA AND LATERAL: ICD-10-PCS | Mod: S$GLB,,, | Performed by: RADIOLOGY

## 2021-06-06 PROCEDURE — 71046 X-RAY EXAM CHEST 2 VIEWS: CPT | Mod: S$GLB,,, | Performed by: RADIOLOGY

## 2021-06-06 PROCEDURE — 99214 OFFICE O/P EST MOD 30 MIN: CPT | Mod: S$GLB,,, | Performed by: PHYSICIAN ASSISTANT

## 2021-06-06 RX ORDER — ALBUTEROL SULFATE 90 UG/1
2 AEROSOL, METERED RESPIRATORY (INHALATION) EVERY 6 HOURS PRN
Qty: 8 G | Refills: 0 | Status: SHIPPED | OUTPATIENT
Start: 2021-06-06 | End: 2022-03-31 | Stop reason: SDUPTHER

## 2021-06-06 RX ORDER — PREDNISONE 20 MG/1
20 TABLET ORAL 2 TIMES DAILY
Qty: 10 TABLET | Refills: 0 | Status: SHIPPED | OUTPATIENT
Start: 2021-06-06 | End: 2021-06-11

## 2021-06-24 ENCOUNTER — PATIENT MESSAGE (OUTPATIENT)
Dept: PRIMARY CARE CLINIC | Facility: CLINIC | Age: 71
End: 2021-06-24

## 2021-09-03 ENCOUNTER — PATIENT MESSAGE (OUTPATIENT)
Dept: NEUROSURGERY | Facility: CLINIC | Age: 71
End: 2021-09-03

## 2021-09-15 ENCOUNTER — OFFICE VISIT (OUTPATIENT)
Dept: URGENT CARE | Facility: CLINIC | Age: 71
End: 2021-09-15

## 2021-09-15 VITALS
HEART RATE: 61 BPM | OXYGEN SATURATION: 98 % | TEMPERATURE: 98 F | SYSTOLIC BLOOD PRESSURE: 135 MMHG | HEIGHT: 64 IN | WEIGHT: 123 LBS | DIASTOLIC BLOOD PRESSURE: 67 MMHG | BODY MASS INDEX: 21 KG/M2

## 2021-09-15 DIAGNOSIS — Z48.02 ENCOUNTER FOR STAPLE REMOVAL: ICD-10-CM

## 2021-09-15 DIAGNOSIS — Z48.02 VISIT FOR SUTURE REMOVAL: Primary | ICD-10-CM

## 2021-09-15 PROCEDURE — S0630 REMOVAL OF SUTURES: HCPCS | Mod: S$GLB,,, | Performed by: PHYSICIAN ASSISTANT

## 2021-09-15 PROCEDURE — S0630 PR REMOVAL OF SUTURES: ICD-10-PCS | Mod: S$GLB,,, | Performed by: PHYSICIAN ASSISTANT

## 2021-09-16 ENCOUNTER — PATIENT MESSAGE (OUTPATIENT)
Dept: PRIMARY CARE CLINIC | Facility: CLINIC | Age: 71
End: 2021-09-16

## 2021-12-21 ENCOUNTER — OFFICE VISIT (OUTPATIENT)
Dept: URGENT CARE | Facility: CLINIC | Age: 71
End: 2021-12-21
Payer: MEDICARE

## 2021-12-21 VITALS
TEMPERATURE: 99 F | DIASTOLIC BLOOD PRESSURE: 82 MMHG | OXYGEN SATURATION: 97 % | WEIGHT: 123 LBS | HEIGHT: 63 IN | HEART RATE: 80 BPM | SYSTOLIC BLOOD PRESSURE: 154 MMHG | BODY MASS INDEX: 21.79 KG/M2 | RESPIRATION RATE: 16 BRPM

## 2021-12-21 DIAGNOSIS — R06.2 WHEEZE: ICD-10-CM

## 2021-12-21 DIAGNOSIS — R05.9 COUGH: Primary | ICD-10-CM

## 2021-12-21 DIAGNOSIS — R09.82 POST-NASAL DRIP: ICD-10-CM

## 2021-12-21 LAB
CTP QC/QA: YES
SARS-COV-2 RDRP RESP QL NAA+PROBE: NEGATIVE

## 2021-12-21 PROCEDURE — U0002: ICD-10-PCS | Mod: QW,CR,S$GLB, | Performed by: PHYSICIAN ASSISTANT

## 2021-12-21 PROCEDURE — 99213 PR OFFICE/OUTPT VISIT, EST, LEVL III, 20-29 MIN: ICD-10-PCS | Mod: S$GLB,,, | Performed by: PHYSICIAN ASSISTANT

## 2021-12-21 PROCEDURE — U0002 COVID-19 LAB TEST NON-CDC: HCPCS | Mod: QW,CR,S$GLB, | Performed by: PHYSICIAN ASSISTANT

## 2021-12-21 PROCEDURE — 99213 OFFICE O/P EST LOW 20 MIN: CPT | Mod: S$GLB,,, | Performed by: PHYSICIAN ASSISTANT

## 2021-12-21 RX ORDER — AZELASTINE 1 MG/ML
1 SPRAY, METERED NASAL 2 TIMES DAILY
Qty: 30 ML | Refills: 0 | Status: SHIPPED | OUTPATIENT
Start: 2021-12-21 | End: 2023-06-29

## 2021-12-21 RX ORDER — PROMETHAZINE HYDROCHLORIDE AND DEXTROMETHORPHAN HYDROBROMIDE 6.25; 15 MG/5ML; MG/5ML
5 SYRUP ORAL 3 TIMES DAILY PRN
Qty: 180 ML | Refills: 0 | Status: SHIPPED | OUTPATIENT
Start: 2021-12-21 | End: 2021-12-31

## 2021-12-21 RX ORDER — PREDNISONE 20 MG/1
20 TABLET ORAL 2 TIMES DAILY
Qty: 10 TABLET | Refills: 0 | Status: SHIPPED | OUTPATIENT
Start: 2021-12-21 | End: 2021-12-26

## 2022-01-12 ENCOUNTER — HOSPITAL ENCOUNTER (OUTPATIENT)
Dept: RADIOLOGY | Facility: HOSPITAL | Age: 72
Discharge: HOME | End: 2022-01-12
Attending: NEUROLOGICAL SURGERY
Payer: MEDICARE

## 2022-01-12 DIAGNOSIS — M54.12 CERVICAL RADICULOPATHY: ICD-10-CM

## 2022-01-12 PROCEDURE — 72050 X-RAY EXAM NECK SPINE 4/5VWS: CPT | Mod: TC,FY,PO

## 2022-01-12 PROCEDURE — 72050 X-RAY EXAM NECK SPINE 4/5VWS: CPT | Mod: 26,,, | Performed by: RADIOLOGY

## 2022-01-12 PROCEDURE — 72050 XR CERVICAL SPINE AP LAT WITH FLEX EXTEN: ICD-10-PCS | Mod: 26,,, | Performed by: RADIOLOGY

## 2022-02-09 ENCOUNTER — PATIENT MESSAGE (OUTPATIENT)
Dept: PRIMARY CARE CLINIC | Facility: CLINIC | Age: 72
End: 2022-02-09
Payer: MEDICARE

## 2022-02-09 DIAGNOSIS — J45.909 MODERATE ASTHMA WITHOUT COMPLICATION, UNSPECIFIED WHETHER PERSISTENT: ICD-10-CM

## 2022-02-09 RX ORDER — ALBUTEROL SULFATE 90 UG/1
2 AEROSOL, METERED RESPIRATORY (INHALATION) EVERY 4 HOURS PRN
Qty: 54 G | Refills: 3 | Status: SHIPPED | OUTPATIENT
Start: 2022-02-09 | End: 2022-03-31 | Stop reason: SDUPTHER

## 2022-02-09 NOTE — TELEPHONE ENCOUNTER
Care Due:                  Date            Visit Type   Department     Provider  --------------------------------------------------------------------------------                                MYCHART                              [facility]LLOW[facility]OF  [facility]   Airam Johnson  Last Visit: 02-      FICE VISIT   [facility]           [place]  Next Visit: None Scheduled  None         None Found                                                            Last  Test          Frequency    Reason                     Performed    Due Date  --------------------------------------------------------------------------------    Office Visit  12 months..  albuterol, lisinopriL....  02- 01-    CMP.........  12 months..  lisinopriL...............  02- 02-    Powered by [employer]. Reference number: [id number].   2/09/2022 2:46:50 PM CST

## 2022-03-20 ENCOUNTER — PATIENT MESSAGE (OUTPATIENT)
Dept: PRIMARY CARE CLINIC | Facility: CLINIC | Age: 72
End: 2022-03-20
Payer: MEDICARE

## 2022-03-20 DIAGNOSIS — I10 ESSENTIAL HYPERTENSION: ICD-10-CM

## 2022-03-21 RX ORDER — LISINOPRIL 30 MG/1
30 TABLET ORAL DAILY
Qty: 90 TABLET | Refills: 3 | OUTPATIENT
Start: 2022-03-21

## 2022-03-21 NOTE — TELEPHONE ENCOUNTER
No new care gaps identified.  Powered by [employer]. Reference number: [id number].   3/21/2022 4:13:31 PM CDT

## 2022-03-22 ENCOUNTER — PES CALL (OUTPATIENT)
Dept: ADMINISTRATIVE | Facility: CLINIC | Age: 72
End: 2022-03-22
Payer: MEDICARE

## 2022-03-23 DIAGNOSIS — I10 ESSENTIAL HYPERTENSION: ICD-10-CM

## 2022-03-23 RX ORDER — LISINOPRIL 30 MG/1
30 TABLET ORAL DAILY
Qty: 30 TABLET | Refills: 0 | OUTPATIENT
Start: 2022-03-23

## 2022-03-23 NOTE — TELEPHONE ENCOUNTER
No new care gaps identified.  Powered by [employer]. Reference number: [id number].   3/23/2022 9:33:39 AM CDT

## 2022-03-25 ENCOUNTER — PATIENT MESSAGE (OUTPATIENT)
Dept: PRIMARY CARE CLINIC | Facility: CLINIC | Age: 72
End: 2022-03-25
Payer: MEDICARE

## 2022-03-25 ENCOUNTER — TELEPHONE (OUTPATIENT)
Dept: PRIMARY CARE CLINIC | Facility: CLINIC | Age: 72
End: 2022-03-25

## 2022-03-25 DIAGNOSIS — I10 ESSENTIAL HYPERTENSION: Primary | ICD-10-CM

## 2022-03-25 DIAGNOSIS — Z00.00 ROUTINE GENERAL MEDICAL EXAMINATION AT A HEALTH CARE FACILITY: ICD-10-CM

## 2022-03-25 NOTE — TELEPHONE ENCOUNTER
Spoke with patient, she was calling to schedule her annual with Dr. Johnson.  Informed that she has a fasting lab appointment on 03/28/2022 at 9:45 and physical with Dr. Johnson on 03/31/2022 at 9:30.  She verbalized understanding.

## 2022-03-28 ENCOUNTER — LAB VISIT (OUTPATIENT)
Dept: LAB | Facility: HOSPITAL | Age: 72
End: 2022-03-28
Attending: FAMILY MEDICINE
Payer: MEDICARE

## 2022-03-28 DIAGNOSIS — I10 ESSENTIAL HYPERTENSION: ICD-10-CM

## 2022-03-28 LAB
ALBUMIN SERPL BCP-MCNC: 4.1 G/DL (ref 3.5–5.2)
ALP SERPL-CCNC: 72 U/L (ref 55–135)
ALT SERPL W/O P-5'-P-CCNC: 17 U/L (ref 10–44)
ANION GAP SERPL CALC-SCNC: 9 MMOL/L (ref 8–16)
AST SERPL-CCNC: 20 U/L (ref 10–40)
BASOPHILS # BLD AUTO: 0.06 K/UL (ref 0–0.2)
BASOPHILS NFR BLD: 1.2 % (ref 0–1.9)
BILIRUB SERPL-MCNC: 1.1 MG/DL (ref 0.1–1)
BUN SERPL-MCNC: 15 MG/DL (ref 8–23)
CALCIUM SERPL-MCNC: 10.7 MG/DL (ref 8.7–10.5)
CHLORIDE SERPL-SCNC: 100 MMOL/L (ref 95–110)
CHOLEST SERPL-MCNC: 222 MG/DL (ref 120–199)
CHOLEST/HDLC SERPL: 2.2 {RATIO} (ref 2–5)
CO2 SERPL-SCNC: 29 MMOL/L (ref 23–29)
CREAT SERPL-MCNC: 0.8 MG/DL (ref 0.5–1.4)
DIFFERENTIAL METHOD: ABNORMAL
EOSINOPHIL # BLD AUTO: 0.1 K/UL (ref 0–0.5)
EOSINOPHIL NFR BLD: 2.8 % (ref 0–8)
ERYTHROCYTE [DISTWIDTH] IN BLOOD BY AUTOMATED COUNT: 12.5 % (ref 11.5–14.5)
EST. GFR  (AFRICAN AMERICAN): >60 ML/MIN/1.73 M^2
EST. GFR  (NON AFRICAN AMERICAN): >60 ML/MIN/1.73 M^2
GLUCOSE SERPL-MCNC: 89 MG/DL (ref 70–110)
HCT VFR BLD AUTO: 46.4 % (ref 37–48.5)
HDLC SERPL-MCNC: 99 MG/DL (ref 40–75)
HDLC SERPL: 44.6 % (ref 20–50)
HGB BLD-MCNC: 15.4 G/DL (ref 12–16)
IMM GRANULOCYTES # BLD AUTO: 0.01 K/UL (ref 0–0.04)
IMM GRANULOCYTES NFR BLD AUTO: 0.2 % (ref 0–0.5)
LDLC SERPL CALC-MCNC: 107.8 MG/DL (ref 63–159)
LYMPHOCYTES # BLD AUTO: 1.6 K/UL (ref 1–4.8)
LYMPHOCYTES NFR BLD: 30.6 % (ref 18–48)
MCH RBC QN AUTO: 31.3 PG (ref 27–31)
MCHC RBC AUTO-ENTMCNC: 33.2 G/DL (ref 32–36)
MCV RBC AUTO: 94 FL (ref 82–98)
MONOCYTES # BLD AUTO: 0.6 K/UL (ref 0.3–1)
MONOCYTES NFR BLD: 12.3 % (ref 4–15)
NEUTROPHILS # BLD AUTO: 2.7 K/UL (ref 1.8–7.7)
NEUTROPHILS NFR BLD: 52.9 % (ref 38–73)
NONHDLC SERPL-MCNC: 123 MG/DL
NRBC BLD-RTO: 0 /100 WBC
PLATELET # BLD AUTO: 243 K/UL (ref 150–450)
PMV BLD AUTO: 10.8 FL (ref 9.2–12.9)
POTASSIUM SERPL-SCNC: 4.9 MMOL/L (ref 3.5–5.1)
PROT SERPL-MCNC: 7.5 G/DL (ref 6–8.4)
RBC # BLD AUTO: 4.92 M/UL (ref 4–5.4)
SODIUM SERPL-SCNC: 138 MMOL/L (ref 136–145)
TRIGL SERPL-MCNC: 76 MG/DL (ref 30–150)
TSH SERPL DL<=0.005 MIU/L-ACNC: 1.88 UIU/ML (ref 0.4–4)
WBC # BLD AUTO: 5.06 K/UL (ref 3.9–12.7)

## 2022-03-28 PROCEDURE — 85025 COMPLETE CBC W/AUTO DIFF WBC: CPT | Performed by: FAMILY MEDICINE

## 2022-03-28 PROCEDURE — 84443 ASSAY THYROID STIM HORMONE: CPT | Performed by: FAMILY MEDICINE

## 2022-03-28 PROCEDURE — 36415 COLL VENOUS BLD VENIPUNCTURE: CPT | Mod: PO | Performed by: FAMILY MEDICINE

## 2022-03-28 PROCEDURE — 80061 LIPID PANEL: CPT | Performed by: FAMILY MEDICINE

## 2022-03-28 PROCEDURE — 80053 COMPREHEN METABOLIC PANEL: CPT | Performed by: FAMILY MEDICINE

## 2022-03-31 ENCOUNTER — OFFICE VISIT (OUTPATIENT)
Dept: PRIMARY CARE CLINIC | Facility: CLINIC | Age: 72
End: 2022-03-31
Payer: MEDICARE

## 2022-03-31 VITALS
WEIGHT: 123 LBS | OXYGEN SATURATION: 97 % | BODY MASS INDEX: 21.79 KG/M2 | TEMPERATURE: 98 F | SYSTOLIC BLOOD PRESSURE: 160 MMHG | HEIGHT: 63 IN | HEART RATE: 74 BPM | DIASTOLIC BLOOD PRESSURE: 100 MMHG

## 2022-03-31 DIAGNOSIS — I10 ESSENTIAL HYPERTENSION: ICD-10-CM

## 2022-03-31 DIAGNOSIS — Z12.31 OTHER SCREENING MAMMOGRAM: ICD-10-CM

## 2022-03-31 DIAGNOSIS — F32.A ANXIETY AND DEPRESSION: ICD-10-CM

## 2022-03-31 DIAGNOSIS — Z13.31 POSITIVE DEPRESSION SCREENING: ICD-10-CM

## 2022-03-31 DIAGNOSIS — J45.901 EXACERBATION OF ASTHMA, UNSPECIFIED ASTHMA SEVERITY, UNSPECIFIED WHETHER PERSISTENT: ICD-10-CM

## 2022-03-31 DIAGNOSIS — M81.6 LOCALIZED OSTEOPOROSIS WITHOUT CURRENT PATHOLOGICAL FRACTURE: Primary | ICD-10-CM

## 2022-03-31 DIAGNOSIS — F41.9 ANXIETY AND DEPRESSION: ICD-10-CM

## 2022-03-31 DIAGNOSIS — J45.909 MODERATE ASTHMA WITHOUT COMPLICATION, UNSPECIFIED WHETHER PERSISTENT: ICD-10-CM

## 2022-03-31 PROCEDURE — 99999 PR PBB SHADOW E&M-EST. PATIENT-LVL IV: CPT | Mod: PBBFAC,,, | Performed by: FAMILY MEDICINE

## 2022-03-31 PROCEDURE — 99214 PR OFFICE/OUTPT VISIT, EST, LEVL IV, 30-39 MIN: ICD-10-PCS | Mod: S$PBB,,, | Performed by: FAMILY MEDICINE

## 2022-03-31 PROCEDURE — 99999 PR PBB SHADOW E&M-EST. PATIENT-LVL IV: ICD-10-PCS | Mod: PBBFAC,,, | Performed by: FAMILY MEDICINE

## 2022-03-31 PROCEDURE — 99214 OFFICE O/P EST MOD 30 MIN: CPT | Mod: S$PBB,,, | Performed by: FAMILY MEDICINE

## 2022-03-31 PROCEDURE — 99214 OFFICE O/P EST MOD 30 MIN: CPT | Mod: PBBFAC,PN | Performed by: FAMILY MEDICINE

## 2022-03-31 RX ORDER — LISINOPRIL 30 MG/1
30 TABLET ORAL DAILY
Qty: 90 EACH | Refills: 3 | Status: SHIPPED | OUTPATIENT
Start: 2022-03-31 | End: 2023-04-26 | Stop reason: SDUPTHER

## 2022-03-31 RX ORDER — ALBUTEROL SULFATE 90 UG/1
2 AEROSOL, METERED RESPIRATORY (INHALATION) EVERY 4 HOURS PRN
Qty: 54 G | Refills: 3 | Status: SHIPPED | OUTPATIENT
Start: 2022-03-31 | End: 2022-03-31 | Stop reason: SDUPTHER

## 2022-03-31 RX ORDER — LISINOPRIL 30 MG/1
30 TABLET ORAL DAILY
Qty: 90 TABLET | Refills: 3 | Status: SHIPPED | OUTPATIENT
Start: 2022-03-31 | End: 2022-03-31 | Stop reason: SDUPTHER

## 2022-03-31 RX ORDER — ESCITALOPRAM OXALATE 5 MG/1
5 TABLET ORAL DAILY
Qty: 30 TABLET | Refills: 11 | Status: SHIPPED | OUTPATIENT
Start: 2022-03-31 | End: 2023-06-08

## 2022-03-31 RX ORDER — ALBUTEROL SULFATE 90 UG/1
2 AEROSOL, METERED RESPIRATORY (INHALATION) EVERY 4 HOURS PRN
Qty: 54 G | Refills: 3 | Status: SHIPPED | OUTPATIENT
Start: 2022-03-31 | End: 2023-09-01 | Stop reason: SDUPTHER

## 2022-03-31 RX ORDER — ALBUTEROL SULFATE 90 UG/1
2 AEROSOL, METERED RESPIRATORY (INHALATION) EVERY 6 HOURS PRN
Qty: 8 G | Refills: 0 | Status: SHIPPED | OUTPATIENT
Start: 2022-03-31 | End: 2023-06-29

## 2022-03-31 NOTE — PROGRESS NOTES
Cindy Ramirez is a 71 y.o. female   Follow-up of multiple medical issues  Source of history: Patient  Past Medical History:   Diagnosis Date    Arthritis     Asthma     Basal cell carcinoma     Cataract     Hyperlipidemia     Hypertension     Insomnia      Patient  reports that she has never smoked. She has never used smokeless tobacco. She reports current alcohol use of about 2.0 standard drinks of alcohol per week. She reports that she does not use drugs.  Family History   Problem Relation Age of Onset    Hypertension Mother     Macular degeneration Mother     Hypertension Father     Cancer Father         prostate    Pancreatic cancer Maternal Grandmother     Heart failure Maternal Grandfather     Dementia Paternal Grandmother     Breast cancer Maternal Aunt      ROS:  GENERAL: No fever, chills, fatigability or weight loss.  SKIN: No rashes, itching or changes in color or texture of skin.  HEAD: No headaches or recent head trauma.  EYES: Visual acuity fine. No photophobia, ocular pain or diplopia.  EARS: Denies ear pain, discharge or vertigo.  NOSE: No loss of smell, no epistaxis or postnasal drip.  MOUTH & THROAT: No hoarseness or change in voice. No excessive gum bleeding.  NODES: Denies swollen glands.  CHEST: Denies STEWART, cyanosis, wheezing, cough and sputum production.  CARDIOVASCULAR: Denies chest pain, PND, orthopnea or reduced exercise tolerance.  ABDOMEN: Appetite fine. No weight loss. Denies diarrhea, abdominal pain, hematemesis or blood in stool.  URINARY: No flank pain, dysuria or hematuria.  PERIPHERAL VASCULAR: No claudication or cyanosis.  MUSCULOSKELETAL: No joint stiffness or swelling. Denies back pain.  NEUROLOGIC: No history of seizures, paralysis, alteration of gait or coordination.    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  HEENT: Both external auditory canals clear. Both tympanic membranes intact. PERRL.   EOMI. Disk margins sharp. No tonsillar enlargement. No pharyngeal erythema or exudate. No stridor.  NECK: No bruits. No cervical spine tenderness. No cervical lymphadenopathy. No thyromegaly.  NODES: No cervical, axillary or inguinal lymph node enlargement.  CHEST: Breath sounds clear bilaterally. Lungs clear to auscultation & percussion.   Good air movement. No rales. No retractions. No rhonchi. No stridor. No wheezes.  CARDIOVASCULAR: Normal S1, S2. No murmurs. No edema.  BREASTS: no masses palpated in either breast or axillary area, symmetry noted.  ABDOMEN: Bowel sounds normal. No palpable aortic enlargement. No CVA tenderness  . No pulsatile mass. No rebound tenderness.  PERIPHERAL VASCULAR: Femoral pulses present and symmetrical. No edema.  MUSCULOSKELETAL: Degenerative changes of both ankles, foot, knee, wrist and hand.  BACK: No CVA tenderness. There is no spasm, tenderness or radiculopathy   noted with palpation and there is full range of motion.   NEUROLOGIC:   Cranial Nerves: II-XII grossly intact.  Motor: 5/5 strength major flexors/extensors. No tremor.  DTR's: Knees, Ankles 2+ and equal bilaterally; downgoing toes.  Sensory: Intact to light touch distally.  Gait & Posture: Normal gait and fine motion. No cerebellar signs.  MENTAL STATUS: Alert. Oriented x 3. Language skills normal. Memory intact.   No suicidal ideation. Normal affect. Normal cognitive functions. Well kept appearance.    ASSESSMENT/PLAN:   Cindy was seen today for annual exam.    Diagnoses and all orders for this visit:    Localized osteoporosis without current pathological fracture  -     DXA Bone Density Spine And Hip; Future    Positive depression screening  Comments:  I have reviewed the positive depression score which warrants active treatment with psychotherapy and/or medications.    Essential hypertension  -      Discontinue: lisinopriL (PRINIVIL,ZESTRIL) 30 MG tablet; Take 1 tablet (30 mg total) by mouth once daily.  -     lisinopriL (PRINIVIL,ZESTRIL) 30 MG tablet; Take 1 tablet (30 mg total) by mouth once daily.  Pt will follow up for bp check , since she forgot to take bp med this am  Moderate asthma without complication, unspecified whether persistent  -     Discontinue: albuterol (PROAIR HFA) 90 mcg/actuation inhaler; Inhale 2 puffs into the lungs every 4 (four) hours as needed. Rescue  -     albuterol (PROAIR HFA) 90 mcg/actuation inhaler; Inhale 2 puffs into the lungs every 4 (four) hours as needed. Rescue    Exacerbation of asthma, unspecified asthma severity, unspecified whether persistent  -     albuterol (PROVENTIL/VENTOLIN HFA) 90 mcg/actuation inhaler; Inhale 2 puffs into the lungs every 6 (six) hours as needed for Wheezing or Shortness of Breath. Rescue    Other screening mammogram  -     Mammo Digital Screening Bilat; Future    Anxiety and depression  -     EScitalopram oxalate (LEXAPRO) 5 MG Tab; Take 1 tablet (5 mg total) by mouth once daily.  Will contact the patient with results of pending test when available

## 2022-04-11 ENCOUNTER — OFFICE VISIT (OUTPATIENT)
Dept: FAMILY MEDICINE | Facility: CLINIC | Age: 72
End: 2022-04-11
Payer: MEDICARE

## 2022-04-11 VITALS
WEIGHT: 124.56 LBS | BODY MASS INDEX: 22.07 KG/M2 | OXYGEN SATURATION: 96 % | HEIGHT: 63 IN | SYSTOLIC BLOOD PRESSURE: 130 MMHG | HEART RATE: 77 BPM | DIASTOLIC BLOOD PRESSURE: 76 MMHG

## 2022-04-11 DIAGNOSIS — Z00.00 ENCOUNTER FOR PREVENTIVE HEALTH EXAMINATION: Primary | ICD-10-CM

## 2022-04-11 DIAGNOSIS — F32.0 CURRENT MILD EPISODE OF MAJOR DEPRESSIVE DISORDER WITHOUT PRIOR EPISODE: ICD-10-CM

## 2022-04-11 DIAGNOSIS — I10 HYPERTENSION, UNSPECIFIED TYPE: ICD-10-CM

## 2022-04-11 PROCEDURE — 99999 PR PBB SHADOW E&M-EST. PATIENT-LVL IV: ICD-10-PCS | Mod: PBBFAC,,, | Performed by: NURSE PRACTITIONER

## 2022-04-11 PROCEDURE — 99999 PR PBB SHADOW E&M-EST. PATIENT-LVL IV: CPT | Mod: PBBFAC,,, | Performed by: NURSE PRACTITIONER

## 2022-04-11 PROCEDURE — G0439 PPPS, SUBSEQ VISIT: HCPCS | Mod: ,,, | Performed by: NURSE PRACTITIONER

## 2022-04-11 PROCEDURE — 99214 OFFICE O/P EST MOD 30 MIN: CPT | Mod: PBBFAC,PO | Performed by: NURSE PRACTITIONER

## 2022-04-11 PROCEDURE — G0439 PR MEDICARE ANNUAL WELLNESS SUBSEQUENT VISIT: ICD-10-PCS | Mod: ,,, | Performed by: NURSE PRACTITIONER

## 2022-04-11 NOTE — PROGRESS NOTES
"  Cindy Ramirez presented for a  Medicare AWV and comprehensive Health Risk Assessment today. The following components were reviewed and updated:    · Medical history  · Family History  · Social history  · Allergies and Current Medications  · Health Risk Assessment  · Health Maintenance  · Care Team         ** See Completed Assessments for Annual Wellness Visit within the encounter summary.**         The following assessments were completed:  · Living Situation  · CAGE  · Depression Screening  · Timed Get Up and Go  · Whisper Test  · Cognitive Function Screening          · Nutrition Screening  · ADL Screening  · PAQ Screening        Vitals:    04/11/22 1353   BP: 130/76   BP Location: Left arm   Patient Position: Sitting   BP Method: Medium (Manual)   Pulse: 77   SpO2: 96%   Weight: 56.5 kg (124 lb 9 oz)   Height: 5' 3" (1.6 m)     Body mass index is 22.06 kg/m².  Physical Exam  Vitals reviewed.   Constitutional:       General: She is not in acute distress.  HENT:      Head: Normocephalic.   Cardiovascular:      Rate and Rhythm: Normal rate.   Pulmonary:      Effort: Pulmonary effort is normal. No respiratory distress.   Skin:     General: Skin is warm.   Neurological:      General: No focal deficit present.      Mental Status: She is alert.   Psychiatric:         Mood and Affect: Mood normal.         Thought Content: Thought content normal.               Diagnoses and health risks identified today and associated recommendations/orders:    1. Encounter for preventive health examination  Reviewed health maintenance and provided recommendations   Recommend pneumonia vaccine, COVID vaccine booster and shingrix vaccine     2. Hypertension, unspecified type  Continue to monitor  Followed by Airam Ross MD .      3. Current mild episode of major depressive disorder without prior episode  Continue to monitor  Followed by Airam Ross MD   Has discussed with pcp last week, has rx for " lexapro, will start lexapro tomorrow and f/u with Airam Ross MD  .        Provided Cindy with a 5-10 year written screening schedule and personal prevention plan. Recommendations were developed using the USPSTF age appropriate recommendations. Education, counseling, and referrals were provided as needed. After Visit Summary printed and given to patient which includes a list of additional screenings\tests needed.    Follow up in one year    Catalina Valles NP  I offered to discuss advanced care planning, including how to pick a person who would make decisions for you if you were unable to make them for yourself, called a health care power of , and what kind of decisions you might make such as use of life sustaining treatments such as ventilators and tube feeding when faced with a life limiting illness recorded on a living will that they will need to know. (How you want to be cared for as you near the end of your natural life)     X Patient is interested in learning more about how to make advanced directives.  I provided them paperwork and offered to discuss this with them.

## 2022-04-11 NOTE — PATIENT INSTRUCTIONS
Counseling and Referral of Other Preventative  (Italic type indicates deductible and co-insurance are waived)    Patient Name: Cindy Ramirez  Today's Date: 4/11/2022    Health Maintenance       Date Due Completion Date    DEXA Scan 07/02/2021 7/2/2019    Mammogram 03/02/2022 3/2/2021    Override on 6/21/2019: Done    Influenza Vaccine (1) 06/30/2022 (Originally 9/1/2021) ---    Shingles Vaccine (1 of 2) 03/31/2023 (Originally 4/30/2000) ---    COVID-19 Vaccine (3 - Booster for Pfizer series) 03/31/2023 (Originally 8/31/2021) 3/31/2021    Pneumococcal Vaccines (Age 65+) (2 - PPSV23 or PCV20) 03/31/2023 (Originally 6/21/2020) 6/21/2019    Lipid Panel 03/28/2027 3/28/2022    Colorectal Cancer Screening 01/08/2030 1/8/2020    Override on 6/21/2019: Done    Override on 10/16/2006: Done    TETANUS VACCINE 09/04/2031 9/4/2021        No orders of the defined types were placed in this encounter.    The following information is provided to all patients.  This information is to help you find resources for any of the problems found today that may be affecting your health:                Living healthy guide: www.[facility].[place].gov      Understanding Diabetes: www.diabetes.org      Eating healthy: www.cdc.gov/healthyweight      CDC home safety checklist: www.cdc.gov/steadi/patient.html      Agency on Aging: www.goea.[place].[facility]      Alcoholics anonymous (AA): www.aa.org      Physical Activity: www.riky.nih.gov/rt8iabs      Tobacco use: www.quitwithusla.org

## 2022-04-13 ENCOUNTER — HOSPITAL ENCOUNTER (OUTPATIENT)
Dept: RADIOLOGY | Facility: HOSPITAL | Age: 72
Discharge: HOME | End: 2022-04-13
Attending: FAMILY MEDICINE
Payer: MEDICARE

## 2022-04-13 DIAGNOSIS — Z12.31 OTHER SCREENING MAMMOGRAM: ICD-10-CM

## 2022-04-13 DIAGNOSIS — M81.6 LOCALIZED OSTEOPOROSIS WITHOUT CURRENT PATHOLOGICAL FRACTURE: ICD-10-CM

## 2022-04-13 PROCEDURE — 77067 SCR MAMMO BI INCL CAD: CPT | Mod: 26,,, | Performed by: RADIOLOGY

## 2022-04-13 PROCEDURE — 77067 MAMMO DIGITAL SCREENING BILAT WITH TOMO: ICD-10-PCS | Mod: 26,,, | Performed by: RADIOLOGY

## 2022-04-13 PROCEDURE — 77063 BREAST TOMOSYNTHESIS BI: CPT | Mod: TC,PO

## 2022-04-13 PROCEDURE — 77080 DXA BONE DENSITY AXIAL: CPT | Mod: 26,,, | Performed by: RADIOLOGY

## 2022-04-13 PROCEDURE — 77063 BREAST TOMOSYNTHESIS BI: CPT | Mod: 26,,, | Performed by: RADIOLOGY

## 2022-04-13 PROCEDURE — 77080 DXA BONE DENSITY AXIAL: CPT | Mod: TC,PO

## 2022-04-13 PROCEDURE — 77063 MAMMO DIGITAL SCREENING BILAT WITH TOMO: ICD-10-PCS | Mod: 26,,, | Performed by: RADIOLOGY

## 2022-04-13 PROCEDURE — 77067 SCR MAMMO BI INCL CAD: CPT | Mod: TC,PO

## 2022-04-13 PROCEDURE — 77080 DEXA BONE DENSITY SPINE HIP: ICD-10-PCS | Mod: 26,,, | Performed by: RADIOLOGY

## 2022-04-14 ENCOUNTER — CLINICAL SUPPORT (OUTPATIENT)
Dept: PRIMARY CARE CLINIC | Facility: CLINIC | Age: 72
End: 2022-04-14
Payer: MEDICARE

## 2022-04-14 VITALS — HEART RATE: 73 BPM | SYSTOLIC BLOOD PRESSURE: 124 MMHG | DIASTOLIC BLOOD PRESSURE: 76 MMHG

## 2022-04-14 DIAGNOSIS — I10 ESSENTIAL HYPERTENSION: Primary | ICD-10-CM

## 2022-04-14 PROCEDURE — 99999 PR PBB SHADOW E&M-EST. PATIENT-LVL I: CPT | Mod: PBBFAC,,,

## 2022-04-14 PROCEDURE — 99211 OFF/OP EST MAY X REQ PHY/QHP: CPT | Mod: PBBFAC,PN

## 2022-04-14 PROCEDURE — 99999 PR PBB SHADOW E&M-EST. PATIENT-LVL I: ICD-10-PCS | Mod: PBBFAC,,,

## 2022-04-14 NOTE — PATIENT INSTRUCTIONS
Pt came in for a nurse bp check bp on 3/31 was 160/100 pt state she had been out of her meds for a few days prior to the clinic visit   Today pt's bp is 124/76 pulse 73 pt state she is taking her bp med  ( lisinopril 30) as directed

## 2022-04-20 PROBLEM — F32.0 CURRENT MILD EPISODE OF MAJOR DEPRESSIVE DISORDER WITHOUT PRIOR EPISODE: Status: ACTIVE | Noted: 2022-04-20

## 2022-05-09 ENCOUNTER — PATIENT MESSAGE (OUTPATIENT)
Dept: SMOKING CESSATION | Facility: CLINIC | Age: 72
End: 2022-05-09
Payer: MEDICARE

## 2022-05-09 ENCOUNTER — OFFICE VISIT (OUTPATIENT)
Dept: URGENT CARE | Facility: CLINIC | Age: 72
End: 2022-05-09
Payer: MEDICARE

## 2022-05-09 VITALS
DIASTOLIC BLOOD PRESSURE: 67 MMHG | TEMPERATURE: 98 F | HEART RATE: 66 BPM | OXYGEN SATURATION: 97 % | BODY MASS INDEX: 21.26 KG/M2 | WEIGHT: 120 LBS | RESPIRATION RATE: 20 BRPM | HEIGHT: 63 IN | SYSTOLIC BLOOD PRESSURE: 136 MMHG

## 2022-05-09 DIAGNOSIS — Z20.822 ENCOUNTER FOR LABORATORY TESTING FOR COVID-19 VIRUS: ICD-10-CM

## 2022-05-09 DIAGNOSIS — J00 NASOPHARYNGITIS: Primary | ICD-10-CM

## 2022-05-09 LAB
CTP QC/QA: YES
CTP QC/QA: YES
POC MOLECULAR INFLUENZA A AGN: NEGATIVE
POC MOLECULAR INFLUENZA B AGN: NEGATIVE
SARS-COV-2 RDRP RESP QL NAA+PROBE: NEGATIVE

## 2022-05-09 PROCEDURE — 99213 PR OFFICE/OUTPT VISIT, EST, LEVL III, 20-29 MIN: ICD-10-PCS | Mod: S$GLB,,, | Performed by: STUDENT IN AN ORGANIZED HEALTH CARE EDUCATION/TRAINING PROGRAM

## 2022-05-09 PROCEDURE — U0002 COVID-19 LAB TEST NON-CDC: HCPCS | Mod: QW,CR,S$GLB, | Performed by: STUDENT IN AN ORGANIZED HEALTH CARE EDUCATION/TRAINING PROGRAM

## 2022-05-09 PROCEDURE — 99213 OFFICE O/P EST LOW 20 MIN: CPT | Mod: S$GLB,,, | Performed by: STUDENT IN AN ORGANIZED HEALTH CARE EDUCATION/TRAINING PROGRAM

## 2022-05-09 PROCEDURE — 87502 INFLUENZA DNA AMP PROBE: CPT | Mod: QW,S$GLB,, | Performed by: STUDENT IN AN ORGANIZED HEALTH CARE EDUCATION/TRAINING PROGRAM

## 2022-05-09 PROCEDURE — 87502 POCT INFLUENZA A/B MOLECULAR: ICD-10-PCS | Mod: QW,S$GLB,, | Performed by: STUDENT IN AN ORGANIZED HEALTH CARE EDUCATION/TRAINING PROGRAM

## 2022-05-09 PROCEDURE — U0002: ICD-10-PCS | Mod: QW,CR,S$GLB, | Performed by: STUDENT IN AN ORGANIZED HEALTH CARE EDUCATION/TRAINING PROGRAM

## 2022-05-09 RX ORDER — FLUTICASONE PROPIONATE 50 MCG
1 SPRAY, SUSPENSION (ML) NASAL 2 TIMES DAILY
Qty: 9.9 ML | Refills: 0 | Status: SHIPPED | OUTPATIENT
Start: 2022-05-09 | End: 2022-05-23

## 2022-05-09 NOTE — PROGRESS NOTES
"Subjective:       Patient ID: Cindy Ramirez is a 72 y.o. female.    Vitals:  height is 5' 3" (1.6 m) and weight is 54.4 kg (120 lb). Her temperature is 97.9 °F (36.6 °C). Her blood pressure is 136/67 and her pulse is 66. Her respiration is 20 and oxygen saturation is 97%.     Chief Complaint: Nasal Congestion    Pt presents for URI. Reports started yesterday with sore throat, congestion, sinus pressure/pain, and fatigue. Has hx of mild intermittent asthma, used albuterol twice yesterday for chest tightness, but has since not had to use it and denied cough or wheezing. No recent ill contacts, though  had COVID ~20d ago. Nyquil with mild relief. Denied f/c, GI sx's, SoB, difficulty breathing.    URI   This is a new problem. The current episode started in the past 7 days. The problem has been unchanged. There has been no fever. Associated symptoms include congestion, headaches, rhinorrhea, sinus pain and a sore throat. Pertinent negatives include no abdominal pain, chest pain, coughing, diarrhea, ear pain, joint pain, joint swelling, nausea, neck pain, plugged ear sensation, rash, sneezing, swollen glands, vomiting or wheezing. She has tried acetaminophen and inhaler use (nyquil) for the symptoms. The treatment provided mild relief.       Constitution: Positive for fatigue. Negative for chills and fever.   HENT: Positive for congestion, postnasal drip, sinus pain, sinus pressure and sore throat. Negative for ear pain, trouble swallowing and voice change.    Neck: Negative for neck pain.   Cardiovascular: Negative for chest pain and sob on exertion.   Eyes: Negative.    Respiratory: Positive for chest tightness and asthma. Negative for cough, sputum production, shortness of breath, stridor and wheezing.    Gastrointestinal: Negative for abdominal pain, nausea, vomiting and diarrhea.   Musculoskeletal: Negative for muscle ache.   Skin: Negative for rash.   Allergic/Immunologic: Positive for asthma. Negative " for sneezing.   Neurological: Positive for headaches. Negative for dizziness.   Psychiatric/Behavioral: Negative for confusion.       Objective:      Physical Exam   Constitutional: She is oriented to person, place, and time. She appears well-developed. She does not appear ill. No distress.   HENT:   Head: Normocephalic and atraumatic.   Ears:   Right Ear: Hearing and external ear normal.   Left Ear: Hearing and external ear normal.   Nose: Mucosal edema and rhinorrhea present. Right sinus exhibits no maxillary sinus tenderness and no frontal sinus tenderness. Left sinus exhibits no maxillary sinus tenderness and no frontal sinus tenderness.   Mouth/Throat: Uvula is midline and mucous membranes are normal. No uvula swelling. Posterior oropharyngeal erythema present. No oropharyngeal exudate, posterior oropharyngeal edema or tonsillar abscesses. No tonsillar exudate.   Eyes: Conjunctivae and EOM are normal. Right eye exhibits no discharge. Left eye exhibits no discharge.   Neck: Neck supple.   Cardiovascular: Normal rate, regular rhythm and normal heart sounds.   Pulmonary/Chest: Effort normal and breath sounds normal. No respiratory distress. She has no wheezes. She has no rhonchi. She has no rales.   Musculoskeletal: Normal range of motion.         General: Normal range of motion.   Lymphadenopathy:     She has no cervical adenopathy.   Neurological: She is alert and oriented to person, place, and time. No cranial nerve deficit (CN II-XII grossly intact).   Skin: Skin is warm, dry and no rash.   Psychiatric: Her behavior is normal. Judgment and thought content normal.   Nursing note and vitals reviewed.    Recent Lab Results       05/09/22  1154   05/09/22  1054        POC Molecular Influenza A Ag Negative         POC Molecular Influenza B Ag Negative          Acceptable Yes   Yes       SARS-CoV-2 RNA, Amplification, Qual   Negative               Assessment:       1. Nasopharyngitis    2. Encounter  for laboratory testing for COVID-19 virus          Plan:         Nasopharyngitis  -     POCT Influenza A/B MOLECULAR  -     fluticasone propionate (FLONASE) 50 mcg/actuation nasal spray; 1 spray (50 mcg total) by Each Nostril route 2 (two) times daily. for 14 days  Dispense: 9.9 mL; Refill: 0  - counseled on home care and OTC medications    Encounter for laboratory testing for COVID-19 virus  -     POCT COVID-19 Rapid Screening    Results, medications and diagnosis reviewed with patient, questions answered, and return precautions given    Follow up if symptoms worsen or fail to improve.    Irineo Catherine MD/MPH  [facility]  [facility]

## 2022-09-05 ENCOUNTER — PATIENT MESSAGE (OUTPATIENT)
Dept: DERMATOLOGY | Facility: CLINIC | Age: 72
End: 2022-09-05
Payer: MEDICARE

## 2022-09-27 ENCOUNTER — OFFICE VISIT (OUTPATIENT)
Dept: URGENT CARE | Facility: CLINIC | Age: 72
End: 2022-09-27
Payer: MEDICARE

## 2022-09-27 VITALS
TEMPERATURE: 99 F | SYSTOLIC BLOOD PRESSURE: 108 MMHG | BODY MASS INDEX: 21.26 KG/M2 | WEIGHT: 120 LBS | DIASTOLIC BLOOD PRESSURE: 69 MMHG | OXYGEN SATURATION: 100 % | HEIGHT: 63 IN | RESPIRATION RATE: 16 BRPM | HEART RATE: 90 BPM

## 2022-09-27 DIAGNOSIS — R06.2 WHEEZING: Primary | ICD-10-CM

## 2022-09-27 DIAGNOSIS — R05.9 COUGH: ICD-10-CM

## 2022-09-27 PROCEDURE — 71046 XR CHEST PA AND LATERAL: ICD-10-PCS | Mod: S$GLB,,, | Performed by: RADIOLOGY

## 2022-09-27 PROCEDURE — 71046 X-RAY EXAM CHEST 2 VIEWS: CPT | Mod: S$GLB,,, | Performed by: RADIOLOGY

## 2022-09-27 PROCEDURE — 99213 PR OFFICE/OUTPT VISIT, EST, LEVL III, 20-29 MIN: ICD-10-PCS | Mod: S$GLB,,, | Performed by: EMERGENCY MEDICINE

## 2022-09-27 PROCEDURE — 99213 OFFICE O/P EST LOW 20 MIN: CPT | Mod: S$GLB,,, | Performed by: EMERGENCY MEDICINE

## 2022-09-27 RX ORDER — PREDNISONE 20 MG/1
TABLET ORAL
Qty: 6 TABLET | Refills: 0 | Status: SHIPPED | OUTPATIENT
Start: 2022-09-27 | End: 2022-10-02

## 2022-09-27 RX ORDER — PROMETHAZINE HYDROCHLORIDE AND DEXTROMETHORPHAN HYDROBROMIDE 6.25; 15 MG/5ML; MG/5ML
5 SYRUP ORAL EVERY 4 HOURS PRN
Qty: 118 ML | Refills: 0 | Status: SHIPPED | OUTPATIENT
Start: 2022-09-27 | End: 2023-06-08

## 2022-09-27 NOTE — PROGRESS NOTES
"Subjective:       Patient ID: Cindy Ramirez is a 72 y.o. female.    Vitals:  height is 5' 3" (1.6 m) and weight is 54.4 kg (120 lb). Her oral temperature is 98.6 °F (37 °C). Her blood pressure is 108/69 and her pulse is 90. Her respiration is 16 and oxygen saturation is 100%.     Chief Complaint: URI    Pt presents with cough and congestion x 11 days. Pt went to  in [place] and was diagnosed with URI. She was given Methylprednisolone 4 mg Andazithiromycin. OTC taken with mild relief and 4/10.  Her COVID test was negative in [place].  The patient does have a history of asthma and has a prescription for ProAir inhaler but has not filled it.  She denies chest pain or shortness of breath.  She denies abdominal pain.  No nausea or vomiting.  No fever or chills.  She has no history of CHF or PE.      URI   This is a new problem. The current episode started 1 to 4 weeks ago. The problem has been unchanged. There has been no fever. Associated symptoms include congestion and coughing. She has tried inhaler use (sudifen, musinex) for the symptoms. The treatment provided mild relief.     HENT:  Positive for congestion.    Respiratory:  Positive for cough.      Objective:      Physical Exam   Constitutional: She is oriented to person, place, and time. She appears well-developed. She is cooperative.  Non-toxic appearance. She does not appear ill. No distress.   HENT:   Head: Normocephalic and atraumatic.   Ears:   Right Ear: Hearing and external ear normal.   Left Ear: Hearing and external ear normal.   Nose: Nose normal. No mucosal edema, rhinorrhea or nasal deformity. No epistaxis. Right sinus exhibits no maxillary sinus tenderness and no frontal sinus tenderness. Left sinus exhibits no maxillary sinus tenderness and no frontal sinus tenderness.   Mouth/Throat: Uvula is midline, oropharynx is clear and moist and mucous membranes are normal. Mucous membranes are moist. No trismus in the jaw. Normal dentition. No uvula " swelling. No oropharyngeal exudate, posterior oropharyngeal edema or posterior oropharyngeal erythema. Oropharynx is clear.   Eyes: Conjunctivae and lids are normal. No scleral icterus.   Neck: Trachea normal and phonation normal. Neck supple. No edema present. No erythema present. No neck rigidity present.   Cardiovascular: Normal rate, regular rhythm, normal heart sounds and normal pulses.   Pulmonary/Chest: Effort normal. No respiratory distress. She has no decreased breath sounds. She has wheezes. She has no rhonchi. She exhibits no tenderness.         Comments: Good air movement.  No respiratory distress.  Marked expiratory wheezing bilaterally in the lower lung fields.    Abdominal: Normal appearance.   Musculoskeletal: Normal range of motion.         General: No deformity. Normal range of motion.      Right lower leg: No edema.      Left lower leg: No edema.      Comments: No unilateral leg swelling.   Neurological: no focal deficit. She is alert and oriented to person, place, and time. She exhibits normal muscle tone. Coordination normal.   Skin: Skin is warm, dry, intact, not diaphoretic and not pale.   Psychiatric: Her speech is normal and behavior is normal. Mood, judgment and thought content normal.   Nursing note and vitals reviewed.        Chest x-ray reveals evidence of early chronic lung disease but no acute abnormalities.  Will continue low-dose prednisone and add inhaler.  Will send cough syrup and refer to Pulmonary    Assessment:       1. Wheezing    2. Cough          Plan:         Wheezing  -     XR CHEST PA AND LATERAL; Future; Expected date: 09/27/2022  -     predniSONE (DELTASONE) 20 MG tablet; Take 1 tablet (20 mg total) by mouth 2 (two) times daily for 1 day, THEN 1 tablet (20 mg total) once daily for 4 days.  Dispense: 6 tablet; Refill: 0  -     Ambulatory referral/consult to Pulmonology    Cough  -     XR CHEST PA AND LATERAL; Future; Expected date: 09/27/2022  -     predniSONE  (DELTASONE) 20 MG tablet; Take 1 tablet (20 mg total) by mouth 2 (two) times daily for 1 day, THEN 1 tablet (20 mg total) once daily for 4 days.  Dispense: 6 tablet; Refill: 0  -     promethazine-dextromethorphan (PROMETHAZINE-DM) 6.25-15 mg/5 mL Syrp; Take 5 mLs by mouth every 4 (four) hours as needed (cough).  Dispense: 118 mL; Refill: 0  -     Ambulatory referral/consult to Pulmonology

## 2022-10-12 ENCOUNTER — OFFICE VISIT (OUTPATIENT)
Dept: URGENT CARE | Facility: CLINIC | Age: 72
End: 2022-10-12
Payer: MEDICARE

## 2022-10-12 VITALS
BODY MASS INDEX: 21.26 KG/M2 | HEART RATE: 68 BPM | HEIGHT: 63 IN | SYSTOLIC BLOOD PRESSURE: 121 MMHG | DIASTOLIC BLOOD PRESSURE: 74 MMHG | WEIGHT: 120 LBS | RESPIRATION RATE: 18 BRPM | OXYGEN SATURATION: 99 % | TEMPERATURE: 98 F

## 2022-10-12 DIAGNOSIS — M25.572 ACUTE LEFT ANKLE PAIN: Primary | ICD-10-CM

## 2022-10-12 DIAGNOSIS — R52 PAIN: ICD-10-CM

## 2022-10-12 DIAGNOSIS — S82.832A OTHER CLOSED FRACTURE OF DISTAL END OF LEFT FIBULA, INITIAL ENCOUNTER: Primary | ICD-10-CM

## 2022-10-12 PROBLEM — S82.409A FIBULA FRACTURE: Status: ACTIVE | Noted: 2022-10-12

## 2022-10-12 PROCEDURE — 99214 OFFICE O/P EST MOD 30 MIN: CPT | Mod: S$GLB,,, | Performed by: INTERNAL MEDICINE

## 2022-10-12 PROCEDURE — 99214 PR OFFICE/OUTPT VISIT, EST, LEVL IV, 30-39 MIN: ICD-10-PCS | Mod: S$GLB,,, | Performed by: INTERNAL MEDICINE

## 2022-10-12 PROCEDURE — 73610 XR ANKLE COMPLETE 3 VIEW LEFT: ICD-10-PCS | Mod: LT,S$GLB,, | Performed by: RADIOLOGY

## 2022-10-12 PROCEDURE — 73610 X-RAY EXAM OF ANKLE: CPT | Mod: LT,S$GLB,, | Performed by: RADIOLOGY

## 2022-10-12 RX ORDER — HYDROCODONE BITARTRATE AND ACETAMINOPHEN 5; 325 MG/1; MG/1
1 TABLET ORAL EVERY 8 HOURS PRN
Qty: 10 TABLET | Refills: 0 | Status: SHIPPED | OUTPATIENT
Start: 2022-10-12 | End: 2022-10-17

## 2022-10-12 NOTE — PROGRESS NOTES
"Subjective:       Patient ID: Cindy Ramirez is a 72 y.o. female.    Vitals:  height is 5' 3" (1.6 m) and weight is 54.4 kg (120 lb). Her temperature is 98.3 °F (36.8 °C). Her blood pressure is 121/74 and her pulse is 68. Her respiration is 18 and oxygen saturation is 99%.     Chief Complaint: Ankle Pain    Patient presents today with complaints of left ankle pain & swelling since last night. Patient fell at home and injured her left ankle. Patient reports pain in left foot also. Patient is currently taking ibuprofen for her symptoms with no relief. Patient is Covid-19 vaccinated.     Ankle Pain   The injury mechanism was a fall. The pain is present in the left foot and left ankle. The pain is at a severity of 9/10. The pain has been Worsening since onset. Associated symptoms include an inability to bear weight and a loss of motion. She has tried NSAIDs for the symptoms.   ROS    Objective:      Physical Exam   Constitutional: She appears well-developed. She is cooperative.   HENT:   Head: Normocephalic.   Eyes: Lids are normal. Pupils are equal, round, and reactive to light. vision grossly intact gaze aligned appropriately   Neck: Neck supple.   Pulmonary/Chest: Effort normal and breath sounds normal.   Musculoskeletal:         General: Swelling, tenderness, deformity and signs of injury present.      Left ankle: She exhibits decreased range of motion, swelling and deformity. Tenderness. Lateral malleolus tenderness found.   Neurological: She is alert.   Skin: Skin is warm.   Psychiatric: She experiences Normal attention. Her speech is normal and behavior is normal. Mood and judgment normal.       Assessment:       1. Other closed fracture of distal end of left fibula, initial encounter    2. Pain          Plan:         Other closed fracture of distal end of left fibula, initial encounter  -     E - OTHER  -     Ambulatory referral/consult to Orthopedics    Pain  -     XR ANKLE COMPLETE 3 VIEW LEFT; Future; " Expected date: 10/12/2022    Other orders  -     HYDROcodone-acetaminophen (NORCO) 5-325 mg per tablet; Take 1 tablet by mouth every 8 (eight) hours as needed for Pain.  Dispense: 10 tablet; Refill: 0       Patient Instructions   If your condition worsens we recommend that you receive another evaluation at the emergency room immediately or contact your primary medical clinics after hours call service to discuss your concerns. You must understand that you've received an Urgent Care treatment only and that you may be released before all of your medical problems are known or treated. You, the patient, will arrange for follow up care as instructed.  Drink plenty of Fluids  Wash hands frequently using mild antibacterial soap lathering for at least 15 seconds then rinse  Get plenty of Rest  Follow up in 1-2 weeks with Primary Care physician if not significantly better.   If you are not allergic please take Tylenol every 4-6 hours as needed and/or Ibuprofen every 6-8 hours as needed, over the counter for pain or fever.    My notes were dictated with M*Modal Fluency Software. Any misspellings or nonsensical grammar should be attributed to its use and allowances made for errors and typographic syntactical error(s).

## 2022-10-13 ENCOUNTER — OFFICE VISIT (OUTPATIENT)
Dept: DERMATOLOGY | Facility: CLINIC | Age: 72
End: 2022-10-13
Payer: MEDICARE

## 2022-10-13 ENCOUNTER — HOSPITAL ENCOUNTER (OUTPATIENT)
Dept: RADIOLOGY | Facility: HOSPITAL | Age: 72
Discharge: HOME | End: 2022-10-13
Attending: ORTHOPAEDIC SURGERY
Payer: MEDICARE

## 2022-10-13 ENCOUNTER — OFFICE VISIT (OUTPATIENT)
Dept: ORTHOPEDICS | Facility: CLINIC | Age: 72
End: 2022-10-13
Payer: MEDICARE

## 2022-10-13 VITALS — WEIGHT: 120 LBS | HEIGHT: 63 IN | BODY MASS INDEX: 21.26 KG/M2

## 2022-10-13 DIAGNOSIS — S93.432A ANKLE SYNDESMOSIS DISRUPTION, LEFT, INITIAL ENCOUNTER: ICD-10-CM

## 2022-10-13 DIAGNOSIS — S82.842A ANKLE FRACTURE, BIMALLEOLAR, CLOSED, LEFT, INITIAL ENCOUNTER: Primary | ICD-10-CM

## 2022-10-13 DIAGNOSIS — M89.9 BONE DISORDER: Primary | ICD-10-CM

## 2022-10-13 DIAGNOSIS — M25.572 ACUTE LEFT ANKLE PAIN: ICD-10-CM

## 2022-10-13 DIAGNOSIS — L57.0 AK (ACTINIC KERATOSIS): ICD-10-CM

## 2022-10-13 DIAGNOSIS — L82.1 SK (SEBORRHEIC KERATOSIS): ICD-10-CM

## 2022-10-13 DIAGNOSIS — Z85.828 PERSONAL HISTORY OF SKIN CANCER: ICD-10-CM

## 2022-10-13 DIAGNOSIS — D69.2 SENILE PURPURA: ICD-10-CM

## 2022-10-13 DIAGNOSIS — L90.5 SCAR: Primary | ICD-10-CM

## 2022-10-13 DIAGNOSIS — L81.4 LENTIGO: ICD-10-CM

## 2022-10-13 PROCEDURE — 99204 OFFICE O/P NEW MOD 45 MIN: CPT | Mod: S$PBB,,, | Performed by: ORTHOPAEDIC SURGERY

## 2022-10-13 PROCEDURE — 99213 OFFICE O/P EST LOW 20 MIN: CPT | Mod: 25,S$PBB,, | Performed by: DERMATOLOGY

## 2022-10-13 PROCEDURE — 73610 X-RAY EXAM OF ANKLE: CPT | Mod: TC,PO,LT

## 2022-10-13 PROCEDURE — 73630 X-RAY EXAM OF FOOT: CPT | Mod: 26,LT,, | Performed by: RADIOLOGY

## 2022-10-13 PROCEDURE — 73630 X-RAY EXAM OF FOOT: CPT | Mod: TC,PO,LT

## 2022-10-13 PROCEDURE — 73630 XR FOOT COMPLETE 3 VIEW LEFT: ICD-10-PCS | Mod: 26,LT,, | Performed by: RADIOLOGY

## 2022-10-13 PROCEDURE — 73610 X-RAY EXAM OF ANKLE: CPT | Mod: 26,LT,, | Performed by: RADIOLOGY

## 2022-10-13 PROCEDURE — 99999 PR PBB SHADOW E&M-EST. PATIENT-LVL III: CPT | Mod: PBBFAC,,, | Performed by: ORTHOPAEDIC SURGERY

## 2022-10-13 PROCEDURE — 99999 PR PBB SHADOW E&M-EST. PATIENT-LVL III: ICD-10-PCS | Mod: PBBFAC,,, | Performed by: ORTHOPAEDIC SURGERY

## 2022-10-13 PROCEDURE — 17003 DESTRUCT PREMALG LES 2-14: CPT | Mod: PBBFAC,PO | Performed by: DERMATOLOGY

## 2022-10-13 PROCEDURE — 17000 DESTRUCT PREMALG LESION: CPT | Mod: PBBFAC,PO | Performed by: DERMATOLOGY

## 2022-10-13 PROCEDURE — 99212 OFFICE O/P EST SF 10 MIN: CPT | Mod: 25,27,PBBFAC,PO | Performed by: DERMATOLOGY

## 2022-10-13 PROCEDURE — 99213 PR OFFICE/OUTPT VISIT, EST, LEVL III, 20-29 MIN: ICD-10-PCS | Mod: 25,S$PBB,, | Performed by: DERMATOLOGY

## 2022-10-13 PROCEDURE — 17003 DESTRUCTION, PREMALIGNANT LESIONS; SECOND THROUGH 14 LESIONS: ICD-10-PCS | Mod: S$PBB,,, | Performed by: DERMATOLOGY

## 2022-10-13 PROCEDURE — 99999 PR PBB SHADOW E&M-EST. PATIENT-LVL II: CPT | Mod: PBBFAC,,, | Performed by: DERMATOLOGY

## 2022-10-13 PROCEDURE — 73610 XR ANKLE COMPLETE 3 VIEW LEFT: ICD-10-PCS | Mod: 26,LT,, | Performed by: RADIOLOGY

## 2022-10-13 PROCEDURE — 17000 PR DESTRUCTION(LASER SURGERY,CRYOSURGERY,CHEMOSURGERY),PREMALIGNANT LESIONS,FIRST LESION: ICD-10-PCS | Mod: S$PBB,,, | Performed by: DERMATOLOGY

## 2022-10-13 PROCEDURE — 17003 DESTRUCT PREMALG LES 2-14: CPT | Mod: S$PBB,,, | Performed by: DERMATOLOGY

## 2022-10-13 PROCEDURE — 99213 OFFICE O/P EST LOW 20 MIN: CPT | Mod: 25,PBBFAC,PN | Performed by: ORTHOPAEDIC SURGERY

## 2022-10-13 PROCEDURE — 99999 PR PBB SHADOW E&M-EST. PATIENT-LVL II: ICD-10-PCS | Mod: PBBFAC,,, | Performed by: DERMATOLOGY

## 2022-10-13 PROCEDURE — 99204 PR OFFICE/OUTPT VISIT, NEW, LEVL IV, 45-59 MIN: ICD-10-PCS | Mod: S$PBB,,, | Performed by: ORTHOPAEDIC SURGERY

## 2022-10-13 PROCEDURE — 17000 DESTRUCT PREMALG LESION: CPT | Mod: S$PBB,,, | Performed by: DERMATOLOGY

## 2022-10-13 RX ORDER — MUPIROCIN 20 MG/G
OINTMENT TOPICAL
Status: CANCELLED | OUTPATIENT
Start: 2022-10-13

## 2022-10-13 NOTE — PROGRESS NOTES
Subjective:       Patient ID:  Cindy Ramirez is a 72 y.o. female who presents for   Chief Complaint   Patient presents with    Lesion     BACK     Patient with new complaint of lesion(s)  Location: back  Duration: 2mos  Symptoms: dark in color  Relieving factors/Previous treatments: none    This is a high risk patient here to check for the development of new lesions.  Pt in a wheelchair today with a broken ankle          Lesion    Review of Systems   Skin:  Positive for daily sunscreen use and activity-related sunscreen use.   Hematologic/Lymphatic: Bruises/bleeds easily.      Objective:    Physical Exam   Constitutional: She appears well-developed and well-nourished. No distress.   Neurological: She is alert and oriented to person, place, and time. She is not disoriented.   Psychiatric: She has a normal mood and affect.   Skin:   Areas Examined (abnormalities noted in diagram):   Head / Face Inspection Performed  Back Inspection Performed  RUE Inspected  LUE Inspection Performed                 Diagram Legend     Erythematous scaling macule/papule c/w actinic keratosis       Vascular papule c/w angioma      Pigmented verrucoid papule/plaque c/w seborrheic keratosis      Yellow umbilicated papule c/w sebaceous hyperplasia      Irregularly shaped tan macule c/w lentigo     1-2 mm smooth white papules consistent with Milia      Movable subcutaneous cyst with punctum c/w epidermal inclusion cyst      Subcutaneous movable cyst c/w pilar cyst      Firm pink to brown papule c/w dermatofibroma      Pedunculated fleshy papule(s) c/w skin tag(s)      Evenly pigmented macule c/w junctional nevus     Mildly variegated pigmented, slightly irregular-bordered macule c/w mildly atypical nevus      Flesh colored to evenly pigmented papule c/w intradermal nevus       Pink pearly papule/plaque c/w basal cell carcinoma      Erythematous hyperkeratotic cursted plaque c/w SCC      Surgical scar with no sign of skin cancer  recurrence      Open and closed comedones      Inflammatory papules and pustules      Verrucoid papule consistent consistent with wart     Erythematous eczematous patches and plaques     Dystrophic onycholytic nail with subungual debris c/w onychomycosis     Umbilicated papule    Erythematous-base heme-crusted tan verrucoid plaque consistent with inflamed seborrheic keratosis     Erythematous Silvery Scaling Plaque c/w Psoriasis     See annotation      Assessment / Plan:        Scar  Personal history of skin cancer  Area(s) of previous NMSC evaluated with no signs of recurrence.    Upper body skin examination performed today including at least 6 points as noted in physical examination. No lesions suspicious for malignancy noted.    Recommend daily sun protection/avoidance and use of at least SPF 30, broad spectrum sunscreen (OTC drug).     AK (actinic keratosis)  Cryosurgery Procedure Note    Verbal consent from the patient is obtained including, but not limited to, risk of hypopigmentation/hyperpigmentation, scar, recurrence of lesion. The patient is aware of the precancerous quality and need for treatment of these lesions. Liquid nitrogen cryosurgery is applied to the 7 actinic keratoses, as detailed in the physical exam, to produce a freeze injury. The patient is aware that blisters may form and is instructed on wound care with gentle cleansing and use of vaseline ointment to keep moist until healed. The patient is supplied a handout on cryosurgery and is instructed to call if lesions do not completely resolve.    SK (seborrheic keratosis)  These are benign inherited growths without a malignant potential. Reassurance given to patient. No treatment is necessary.     Lentigo  This is a benign hyperpigmented sun induced lesion. Recommend daily sun protection/avoidance and use of at least SPF 30, broad spectrum sunscreen (OTC drug) will reduce the number of new lesions. Treatment of these benign lesions are  considered cosmetic.  The nature of sun-induced photo-aging and skin cancers is discussed.  Sun avoidance, protective clothing, and the use of 30-SPF sunscreens is advised. Observe closely for skin damage/changes, and call if such occurs.    Senile purpura  Reassurance. Secondary to sun damage  and age causing weakening of the support around the blood vessels.   Can be exacerbated by blood thinners as well  Can use OTC Amlactin lotion or Dermend           Follow up in about 1 year (around 10/13/2023) for MD [facility] on [facility].

## 2022-10-13 NOTE — PROGRESS NOTES
Status/Diagnosis: Displaced Left trimalleolar ankle fracture.  Date of Surgery: none.  Date of Injury: 10/11/2022  Return visit: 2 weeks postop  X-rays on Return: [facility] 3-views Left ankle XOP    Chief Complaint:   Chief Complaint   Patient presents with    Left Ankle - Injury, Pain, Swelling     Left Fibula - Fell 10/11/2022     Present History:  Cindy Ramirez is a 72 y.o. female who presents today status post ground level fall while at home 2 days ago.  Immediate pain, swelling, and difficulty bearing weight on the left ankle.  Denies any pain prior to injury.  Seen at local urgent care clinic at which time x-rays were taken showing displaced trimalleolar ankle fracture.  Patient was placed into a boot.  She has attempted to bear weight since that time but with significant pain.  Denies any numbness or tingling.  Independent ambulator at baseline.  Past medical history significant for asthma, hypertension, and partial facial paralysis.      Past Medical History:   Diagnosis Date    Arthritis     Asthma     Basal cell carcinoma 2014    right cheek    Cataract     Hyperlipidemia     Hypertension     Insomnia        Past Surgical History:   Procedure Laterality Date    BACK SURGERY      CATARACT EXTRACTION W/  INTRAOCULAR LENS IMPLANT Right 08/22/2019    +12.5    COLONOSCOPY N/A 1/8/2020    Procedure: COLONOSCOPY;  Surgeon: Jameson Tovar MD;  Location: [facility];  Service: Endoscopy;  Laterality: N/A;    EYE SURGERY      cataract ou    NECK SURGERY  2000    corpectomy    SPINAL FUSION      TONSILLECTOMY      tonsils         Current Outpatient Medications   Medication Sig    albuterol (PROAIR HFA) 90 mcg/actuation inhaler Inhale 2 puffs into the lungs every 4 (four) hours as needed. Rescue    albuterol (PROVENTIL/VENTOLIN HFA) 90 mcg/actuation inhaler Inhale 2 puffs into the lungs every 6 (six) hours as needed for Wheezing or Shortness of Breath. Rescue    aspirin (ECOTRIN) 81 MG EC tablet Take 81 mg by mouth  once daily.    azelastine (ASTELIN) 137 mcg (0.1 %) nasal spray 1 spray (137 mcg total) by Nasal route 2 (two) times daily.    calcium-vitamin D3 (OS-SHABANA 500 + D3) 500 mg-5 mcg (200 unit) per tablet Take 1 tablet by mouth 2 (two) times daily with meals.    EScitalopram oxalate (LEXAPRO) 5 MG Tab Take 1 tablet (5 mg total) by mouth once daily.    HYDROcodone-acetaminophen (NORCO) 5-325 mg per tablet Take 1 tablet by mouth every 8 (eight) hours as needed for Pain.    lisinopriL (PRINIVIL,ZESTRIL) 30 MG tablet Take 1 tablet (30 mg total) by mouth once daily.    multivitamin capsule Take 1 capsule by mouth once daily.    promethazine-dextromethorphan (PROMETHAZINE-DM) 6.25-15 mg/5 mL Syrp Take 5 mLs by mouth every 4 (four) hours as needed (cough).     No current facility-administered medications for this visit.       Review of patient's allergies indicates:   Allergen Reactions    Penicillins      Other reaction(s): Unknown       Family History   Problem Relation Age of Onset    Hypertension Mother     Macular degeneration Mother     Hypertension Father     Cancer Father         prostate    Pancreatic cancer Maternal Grandmother     Heart failure Maternal Grandfather     Dementia Paternal Grandmother     Breast cancer Maternal Aunt        Social History     Socioeconomic History    Marital status:    Occupational History     Employer: [employer]     Comment: retired (08/30/2015)   Tobacco Use    Smoking status: Never     Passive exposure: Never    Smokeless tobacco: Never   Substance and Sexual Activity    Alcohol use: Yes     Alcohol/week: 7.0 standard drinks     Types: 7 Glasses of wine per week    Drug use: No    Sexual activity: Yes     Partners: Male     Birth control/protection: None     Social Determinants of Health     Financial Resource Strain: Low Risk     Difficulty of Paying Living Expenses: Not hard at all   Food Insecurity: No Food Insecurity    Worried About Running Out of Food  in the Last Year: Never true    Ran Out of Food in the Last Year: Never true   Transportation Needs: No Transportation Needs    Lack of Transportation (Medical): No    Lack of Transportation (Non-Medical): No   Physical Activity: Inactive    Days of Exercise per Week: 0 days    Minutes of Exercise per Session: 0 min   Stress: Stress Concern Present    Feeling of Stress : Very much   Social Connections: Moderately Integrated    Frequency of Communication with Friends and Family: More than three times a week    Frequency of Social Gatherings with Friends and Family: More than three times a week    Attends Sikh Services: More than 4 times per year    Active Member of Clubs or Organizations: No    Attends Club or Organization Meetings: Never    Marital Status:    Housing Stability: Low Risk     Unable to Pay for Housing in the Last Year: No    Number of Places Lived in the Last Year: 1    Unstable Housing in the Last Year: No       Physical exam:  There were no vitals filed for this visit.  Body mass index is 21.26 kg/m².  General: In no apparent distress; well developed and well nourished.  HEENT: normocephalic; atraumatic.  Cardiovascular: regular rate.  Respiratory: no increased work of breathing.  Musculoskeletal:   Gait:  Deferred ambulation secondary to pain  Inspection:  Significant swelling ecchymosis about the medial and lateral aspect of the left ankle.  Tenderness present at the medial and lateral malleoli.  No skin wrinkling present on exam today.  Range of motion at extremes secondary to pain.  Gross motor and sensory function intact.  Palpable pedal pulses.                   Imaging Studies/Outside documentation:  I have ordered/reviewed/interpreted the following images/outside documentation:  1. Weight bearing 3-views of Left foot and ankle:  Displaced trimalleolar ankle fracture with medial clear space widening.  Moderate posterior subluxation of the tibiotalar joint.  Chronic appearing sub  fibular os.        Assessment:  Cindy Ramirez is a 72 y.o. female with acute displaced left trimalleolar ankle fracture.  Date of injury:  10/11/2022.     Plan:   Clinical and radiographic findings were discussed. Operative vs nonoperative treatment options were described. These include but are not limited to bleeding; infection; damage to surrounding nerves or vessels; persistent pain, stiffness; nonunion; malunion; recurrent deformity; need for additional procedures; amputation; blood clots; pulmonary embolus; cardiac events; stroke; and the general risks of anesthesia including anesthetic death.   We also reviewed postop protocol as well as limitations and expectations. Patient understands and desires to proceed with surgical intervention.   Explained risks, benefits, and alternative to the patient. Asked if any questions--none.  Surgery to include but not limited to: Left trimalleolar ankle fracture open reduction internal fixation; possible ligament repair; surgery as indicated.    Will obtain preoperative vitamin-D level and treat accordingly.    This note was created using voice recognition software and may contain grammatical errors.

## 2022-10-13 NOTE — H&P (VIEW-ONLY)
Refill request received from [facility] for pantoprazole. Chart reviewed. Medication refilled.      Status/Diagnosis: Displaced Left trimalleolar ankle fracture.  Date of Surgery: none.  Date of Injury: 10/11/2022  Return visit: 2 weeks postop  X-rays on Return: [facility] 3-views Left ankle XOP    Chief Complaint:   Chief Complaint   Patient presents with    Left Ankle - Injury, Pain, Swelling     Left Fibula - Fell 10/11/2022     Present History:  Cindy Ramirez is a 72 y.o. female who presents today status post ground level fall while at home 2 days ago.  Immediate pain, swelling, and difficulty bearing weight on the left ankle.  Denies any pain prior to injury.  Seen at local urgent care clinic at which time x-rays were taken showing displaced trimalleolar ankle fracture.  Patient was placed into a boot.  She has attempted to bear weight since that time but with significant pain.  Denies any numbness or tingling.  Independent ambulator at baseline.  Past medical history significant for asthma, hypertension, and partial facial paralysis.      Past Medical History:   Diagnosis Date    Arthritis     Asthma     Basal cell carcinoma 2014    right cheek    Cataract     Hyperlipidemia     Hypertension     Insomnia        Past Surgical History:   Procedure Laterality Date    BACK SURGERY      CATARACT EXTRACTION W/  INTRAOCULAR LENS IMPLANT Right 08/22/2019    +12.5    COLONOSCOPY N/A 1/8/2020    Procedure: COLONOSCOPY;  Surgeon: Jameson Tovar MD;  Location: [facility];  Service: Endoscopy;  Laterality: N/A;    EYE SURGERY      cataract ou    NECK SURGERY  2000    corpectomy    SPINAL FUSION      TONSILLECTOMY      tonsils         Current Outpatient Medications   Medication Sig    albuterol (PROAIR HFA) 90 mcg/actuation inhaler Inhale 2 puffs into the lungs every 4 (four) hours as needed. Rescue    albuterol (PROVENTIL/VENTOLIN HFA) 90 mcg/actuation inhaler Inhale 2 puffs into the lungs every 6 (six) hours as needed for Wheezing or Shortness of Breath. Rescue    aspirin (ECOTRIN) 81 MG EC tablet Take 81 mg by mouth  once daily.    azelastine (ASTELIN) 137 mcg (0.1 %) nasal spray 1 spray (137 mcg total) by Nasal route 2 (two) times daily.    calcium-vitamin D3 (OS-SHABANA 500 + D3) 500 mg-5 mcg (200 unit) per tablet Take 1 tablet by mouth 2 (two) times daily with meals.    EScitalopram oxalate (LEXAPRO) 5 MG Tab Take 1 tablet (5 mg total) by mouth once daily.    HYDROcodone-acetaminophen (NORCO) 5-325 mg per tablet Take 1 tablet by mouth every 8 (eight) hours as needed for Pain.    lisinopriL (PRINIVIL,ZESTRIL) 30 MG tablet Take 1 tablet (30 mg total) by mouth once daily.    multivitamin capsule Take 1 capsule by mouth once daily.    promethazine-dextromethorphan (PROMETHAZINE-DM) 6.25-15 mg/5 mL Syrp Take 5 mLs by mouth every 4 (four) hours as needed (cough).     No current facility-administered medications for this visit.       Review of patient's allergies indicates:   Allergen Reactions    Penicillins      Other reaction(s): Unknown       Family History   Problem Relation Age of Onset    Hypertension Mother     Macular degeneration Mother     Hypertension Father     Cancer Father         prostate    Pancreatic cancer Maternal Grandmother     Heart failure Maternal Grandfather     Dementia Paternal Grandmother     Breast cancer Maternal Aunt        Social History     Socioeconomic History    Marital status:    Occupational History     Employer: [employer]     Comment: retired (08/30/2015)   Tobacco Use    Smoking status: Never     Passive exposure: Never    Smokeless tobacco: Never   Substance and Sexual Activity    Alcohol use: Yes     Alcohol/week: 7.0 standard drinks     Types: 7 Glasses of wine per week    Drug use: No    Sexual activity: Yes     Partners: Male     Birth control/protection: None     Social Determinants of Health     Financial Resource Strain: Low Risk     Difficulty of Paying Living Expenses: Not hard at all   Food Insecurity: No Food Insecurity    Worried About Running Out of Food  in the Last Year: Never true    Ran Out of Food in the Last Year: Never true   Transportation Needs: No Transportation Needs    Lack of Transportation (Medical): No    Lack of Transportation (Non-Medical): No   Physical Activity: Inactive    Days of Exercise per Week: 0 days    Minutes of Exercise per Session: 0 min   Stress: Stress Concern Present    Feeling of Stress : Very much   Social Connections: Moderately Integrated    Frequency of Communication with Friends and Family: More than three times a week    Frequency of Social Gatherings with Friends and Family: More than three times a week    Attends Latter day Services: More than 4 times per year    Active Member of Clubs or Organizations: No    Attends Club or Organization Meetings: Never    Marital Status:    Housing Stability: Low Risk     Unable to Pay for Housing in the Last Year: No    Number of Places Lived in the Last Year: 1    Unstable Housing in the Last Year: No       Physical exam:  There were no vitals filed for this visit.  Body mass index is 21.26 kg/m².  General: In no apparent distress; well developed and well nourished.  HEENT: normocephalic; atraumatic.  Cardiovascular: regular rate.  Respiratory: no increased work of breathing.  Musculoskeletal:   Gait:  Deferred ambulation secondary to pain  Inspection:  Significant swelling ecchymosis about the medial and lateral aspect of the left ankle.  Tenderness present at the medial and lateral malleoli.  No skin wrinkling present on exam today.  Range of motion at extremes secondary to pain.  Gross motor and sensory function intact.  Palpable pedal pulses.                   Imaging Studies/Outside documentation:  I have ordered/reviewed/interpreted the following images/outside documentation:  1. Weight bearing 3-views of Left foot and ankle:  Displaced trimalleolar ankle fracture with medial clear space widening.  Moderate posterior subluxation of the tibiotalar joint.  Chronic appearing sub  fibular os.        Assessment:  Cindy Ramirez is a 72 y.o. female with acute displaced left trimalleolar ankle fracture.  Date of injury:  10/11/2022.     Plan:   Clinical and radiographic findings were discussed. Operative vs nonoperative treatment options were described. These include but are not limited to bleeding; infection; damage to surrounding nerves or vessels; persistent pain, stiffness; nonunion; malunion; recurrent deformity; need for additional procedures; amputation; blood clots; pulmonary embolus; cardiac events; stroke; and the general risks of anesthesia including anesthetic death.   We also reviewed postop protocol as well as limitations and expectations. Patient understands and desires to proceed with surgical intervention.   Explained risks, benefits, and alternative to the patient. Asked if any questions--none.  Surgery to include but not limited to: Left trimalleolar ankle fracture open reduction internal fixation; possible ligament repair; surgery as indicated.    Will obtain preoperative vitamin-D level and treat accordingly.    This note was created using voice recognition software and may contain grammatical errors.

## 2022-10-18 ENCOUNTER — TELEPHONE (OUTPATIENT)
Dept: ORTHOPEDICS | Facility: CLINIC | Age: 72
End: 2022-10-18
Payer: MEDICARE

## 2022-10-18 NOTE — TELEPHONE ENCOUNTER
----- Message from Rita Calderon sent at 10/18/2022  3:43 PM CDT -----  Regarding: Rita with [facility]  I received an order for a knee walker, but the ordering physician is not [facility] certified.  I am unable to process this order.  Thank you

## 2022-10-19 ENCOUNTER — ANESTHESIA EVENT (OUTPATIENT)
Dept: SURGERY | Facility: HOSPITAL | Age: 72
End: 2022-10-19
Payer: MEDICARE

## 2022-10-19 ENCOUNTER — HOSPITAL ENCOUNTER (OUTPATIENT)
Facility: HOSPITAL | Age: 72
Discharge: HOME | End: 2022-10-19
Attending: ORTHOPAEDIC SURGERY | Admitting: ORTHOPAEDIC SURGERY
Payer: MEDICARE

## 2022-10-19 ENCOUNTER — ANESTHESIA (OUTPATIENT)
Dept: SURGERY | Facility: HOSPITAL | Age: 72
End: 2022-10-19
Payer: MEDICARE

## 2022-10-19 ENCOUNTER — HOSPITAL ENCOUNTER (OUTPATIENT)
Dept: RADIOLOGY | Facility: HOSPITAL | Age: 72
Discharge: HOME | End: 2022-10-19
Attending: ORTHOPAEDIC SURGERY
Payer: MEDICARE

## 2022-10-19 VITALS
TEMPERATURE: 97 F | HEIGHT: 63 IN | SYSTOLIC BLOOD PRESSURE: 136 MMHG | HEART RATE: 76 BPM | RESPIRATION RATE: 16 BRPM | BODY MASS INDEX: 21.26 KG/M2 | OXYGEN SATURATION: 97 % | DIASTOLIC BLOOD PRESSURE: 63 MMHG | WEIGHT: 120 LBS

## 2022-10-19 DIAGNOSIS — M25.572 LEFT ANKLE PAIN: ICD-10-CM

## 2022-10-19 DIAGNOSIS — S93.432A ANKLE SYNDESMOSIS DISRUPTION, LEFT, INITIAL ENCOUNTER: ICD-10-CM

## 2022-10-19 DIAGNOSIS — S82.842A ANKLE FRACTURE, BIMALLEOLAR, CLOSED, LEFT, INITIAL ENCOUNTER: ICD-10-CM

## 2022-10-19 PROCEDURE — D9220A PRA ANESTHESIA: ICD-10-PCS | Mod: ANES,,, | Performed by: ANESTHESIOLOGY

## 2022-10-19 PROCEDURE — 36000708 HC OR TIME LEV III 1ST 15 MIN: Mod: PO | Performed by: ORTHOPAEDIC SURGERY

## 2022-10-19 PROCEDURE — 37000008 HC ANESTHESIA 1ST 15 MINUTES: Mod: PO | Performed by: ORTHOPAEDIC SURGERY

## 2022-10-19 PROCEDURE — 27829 TREAT LOWER LEG JOINT: CPT | Mod: 51,LT,, | Performed by: ORTHOPAEDIC SURGERY

## 2022-10-19 PROCEDURE — 25000003 PHARM REV CODE 250: Mod: PO | Performed by: ANESTHESIOLOGY

## 2022-10-19 PROCEDURE — D9220A PRA ANESTHESIA: Mod: CRNA,,, | Performed by: NURSE ANESTHETIST, CERTIFIED REGISTERED

## 2022-10-19 PROCEDURE — 63600175 PHARM REV CODE 636 W HCPCS: Mod: PO | Performed by: ORTHOPAEDIC SURGERY

## 2022-10-19 PROCEDURE — D9220A PRA ANESTHESIA: ICD-10-PCS | Mod: CRNA,,, | Performed by: NURSE ANESTHETIST, CERTIFIED REGISTERED

## 2022-10-19 PROCEDURE — 36000709 HC OR TIME LEV III EA ADD 15 MIN: Mod: PO | Performed by: ORTHOPAEDIC SURGERY

## 2022-10-19 PROCEDURE — 63600175 PHARM REV CODE 636 W HCPCS: Mod: PO | Performed by: NURSE ANESTHETIST, CERTIFIED REGISTERED

## 2022-10-19 PROCEDURE — 37000009 HC ANESTHESIA EA ADD 15 MINS: Mod: PO | Performed by: ORTHOPAEDIC SURGERY

## 2022-10-19 PROCEDURE — 27822 PR OPEN TX TRIMALLEOLAR ANKLE FX W/O FIX PST LIP: ICD-10-PCS | Mod: LT,,, | Performed by: ORTHOPAEDIC SURGERY

## 2022-10-19 PROCEDURE — 76000 FLUOROSCOPY <1 HR PHYS/QHP: CPT | Mod: TC,PO

## 2022-10-19 PROCEDURE — 27822 TREATMENT OF ANKLE FRACTURE: CPT | Mod: LT,,, | Performed by: ORTHOPAEDIC SURGERY

## 2022-10-19 PROCEDURE — 71000033 HC RECOVERY, INTIAL HOUR: Mod: PO | Performed by: ORTHOPAEDIC SURGERY

## 2022-10-19 PROCEDURE — C1713 ANCHOR/SCREW BN/BN,TIS/BN: HCPCS | Mod: PO | Performed by: ORTHOPAEDIC SURGERY

## 2022-10-19 PROCEDURE — 25000003 PHARM REV CODE 250: Mod: PO | Performed by: NURSE ANESTHETIST, CERTIFIED REGISTERED

## 2022-10-19 PROCEDURE — 27201423 OPTIME MED/SURG SUP & DEVICES STERILE SUPPLY: Mod: PO | Performed by: ORTHOPAEDIC SURGERY

## 2022-10-19 PROCEDURE — D9220A PRA ANESTHESIA: Mod: ANES,,, | Performed by: ANESTHESIOLOGY

## 2022-10-19 PROCEDURE — 63600175 PHARM REV CODE 636 W HCPCS: Mod: PO | Performed by: ANESTHESIOLOGY

## 2022-10-19 PROCEDURE — C1769 GUIDE WIRE: HCPCS | Mod: PO | Performed by: ORTHOPAEDIC SURGERY

## 2022-10-19 PROCEDURE — 27829 PR OPEN TX DISTAL TIBIOFIBULAR JOINT DISRUPTION: ICD-10-PCS | Mod: 51,LT,, | Performed by: ORTHOPAEDIC SURGERY

## 2022-10-19 PROCEDURE — 25000003 PHARM REV CODE 250: Mod: PO | Performed by: ORTHOPAEDIC SURGERY

## 2022-10-19 PROCEDURE — 71000015 HC POSTOP RECOV 1ST HR: Mod: PO | Performed by: ORTHOPAEDIC SURGERY

## 2022-10-19 DEVICE — SCREW LP VA 3.5X14MM LOK TI: Type: IMPLANTABLE DEVICE | Site: ANKLE | Status: FUNCTIONAL

## 2022-10-19 DEVICE — IMPLANTABLE DEVICE: Type: IMPLANTABLE DEVICE | Site: ANKLE | Status: FUNCTIONAL

## 2022-10-19 DEVICE — SCREW LP VA 3X14MM LOK TI: Type: IMPLANTABLE DEVICE | Site: ANKLE | Status: FUNCTIONAL

## 2022-10-19 DEVICE — SCREW LP TITANIUM 3X20MM: Type: IMPLANTABLE DEVICE | Site: ANKLE | Status: FUNCTIONAL

## 2022-10-19 RX ORDER — SODIUM CHLORIDE, SODIUM LACTATE, POTASSIUM CHLORIDE, CALCIUM CHLORIDE 600; 310; 30; 20 MG/100ML; MG/100ML; MG/100ML; MG/100ML
INJECTION, SOLUTION INTRAVENOUS CONTINUOUS
Status: DISCONTINUED | OUTPATIENT
Start: 2022-10-19 | End: 2022-10-19 | Stop reason: HOSPADM

## 2022-10-19 RX ORDER — MUPIROCIN 20 MG/G
OINTMENT TOPICAL
Status: DISCONTINUED | OUTPATIENT
Start: 2022-10-19 | End: 2022-10-19 | Stop reason: HOSPADM

## 2022-10-19 RX ORDER — MIDAZOLAM HYDROCHLORIDE 1 MG/ML
INJECTION INTRAMUSCULAR; INTRAVENOUS
Status: DISCONTINUED | OUTPATIENT
Start: 2022-10-19 | End: 2022-10-19

## 2022-10-19 RX ORDER — MEPERIDINE HYDROCHLORIDE 50 MG/ML
12.5 INJECTION INTRAMUSCULAR; INTRAVENOUS; SUBCUTANEOUS ONCE
Status: DISCONTINUED | OUTPATIENT
Start: 2022-10-19 | End: 2022-10-19 | Stop reason: HOSPADM

## 2022-10-19 RX ORDER — SODIUM CHLORIDE 0.9 % (FLUSH) 0.9 %
10 SYRINGE (ML) INJECTION ONCE
Status: DISCONTINUED | OUTPATIENT
Start: 2022-10-19 | End: 2022-10-19 | Stop reason: HOSPADM

## 2022-10-19 RX ORDER — FENTANYL CITRATE 50 UG/ML
INJECTION, SOLUTION INTRAMUSCULAR; INTRAVENOUS
Status: DISCONTINUED | OUTPATIENT
Start: 2022-10-19 | End: 2022-10-19

## 2022-10-19 RX ORDER — CEFAZOLIN SODIUM 2 G/50ML
2 SOLUTION INTRAVENOUS
Status: COMPLETED | OUTPATIENT
Start: 2022-10-19 | End: 2022-10-19

## 2022-10-19 RX ORDER — PROPOFOL 10 MG/ML
VIAL (ML) INTRAVENOUS
Status: DISCONTINUED | OUTPATIENT
Start: 2022-10-19 | End: 2022-10-19

## 2022-10-19 RX ORDER — ONDANSETRON 2 MG/ML
INJECTION INTRAMUSCULAR; INTRAVENOUS
Status: DISCONTINUED | OUTPATIENT
Start: 2022-10-19 | End: 2022-10-19

## 2022-10-19 RX ORDER — MIDAZOLAM HYDROCHLORIDE 1 MG/ML
0.5 INJECTION INTRAMUSCULAR; INTRAVENOUS
Status: DISCONTINUED | OUTPATIENT
Start: 2022-10-19 | End: 2022-10-19 | Stop reason: HOSPADM

## 2022-10-19 RX ORDER — PHENYLEPHRINE HYDROCHLORIDE 10 MG/ML
INJECTION INTRAVENOUS
Status: DISCONTINUED | OUTPATIENT
Start: 2022-10-19 | End: 2022-10-19

## 2022-10-19 RX ORDER — DIPHENHYDRAMINE HYDROCHLORIDE 50 MG/ML
25 INJECTION INTRAMUSCULAR; INTRAVENOUS EVERY 6 HOURS PRN
Status: DISCONTINUED | OUTPATIENT
Start: 2022-10-19 | End: 2022-10-19 | Stop reason: HOSPADM

## 2022-10-19 RX ORDER — ACETAMINOPHEN 10 MG/ML
INJECTION, SOLUTION INTRAVENOUS
Status: DISCONTINUED | OUTPATIENT
Start: 2022-10-19 | End: 2022-10-19

## 2022-10-19 RX ORDER — OXYCODONE HYDROCHLORIDE 5 MG/1
5 TABLET ORAL
Status: DISCONTINUED | OUTPATIENT
Start: 2022-10-19 | End: 2022-10-19 | Stop reason: HOSPADM

## 2022-10-19 RX ORDER — HYDROMORPHONE HYDROCHLORIDE 2 MG/ML
0.2 INJECTION, SOLUTION INTRAMUSCULAR; INTRAVENOUS; SUBCUTANEOUS EVERY 5 MIN PRN
Status: DISCONTINUED | OUTPATIENT
Start: 2022-10-19 | End: 2022-10-19 | Stop reason: HOSPADM

## 2022-10-19 RX ORDER — ONDANSETRON HYDROCHLORIDE 8 MG/1
8 TABLET, FILM COATED ORAL EVERY 12 HOURS PRN
Qty: 24 TABLET | Refills: 1 | Status: SHIPPED | OUTPATIENT
Start: 2022-10-19 | End: 2022-10-26

## 2022-10-19 RX ORDER — OXYCODONE AND ACETAMINOPHEN 5; 325 MG/1; MG/1
1 TABLET ORAL
Qty: 42 TABLET | Refills: 0 | Status: SHIPPED | OUTPATIENT
Start: 2022-10-19 | End: 2022-10-26

## 2022-10-19 RX ORDER — LIDOCAINE HCL/PF 100 MG/5ML
SYRINGE (ML) INTRAVENOUS
Status: DISCONTINUED | OUTPATIENT
Start: 2022-10-19 | End: 2022-10-19

## 2022-10-19 RX ORDER — FENTANYL CITRATE 50 UG/ML
25 INJECTION, SOLUTION INTRAMUSCULAR; INTRAVENOUS EVERY 5 MIN PRN
Status: DISCONTINUED | OUTPATIENT
Start: 2022-10-19 | End: 2022-10-19 | Stop reason: HOSPADM

## 2022-10-19 RX ADMIN — FENTANYL CITRATE 50 MCG: 50 INJECTION, SOLUTION INTRAMUSCULAR; INTRAVENOUS at 03:10

## 2022-10-19 RX ADMIN — SODIUM CHLORIDE, SODIUM LACTATE, POTASSIUM CHLORIDE, AND CALCIUM CHLORIDE: .6; .31; .03; .02 INJECTION, SOLUTION INTRAVENOUS at 04:10

## 2022-10-19 RX ADMIN — PHENYLEPHRINE HYDROCHLORIDE 100 MCG: 10 INJECTION INTRAVENOUS at 04:10

## 2022-10-19 RX ADMIN — LIDOCAINE HYDROCHLORIDE 80 MG: 20 INJECTION, SOLUTION INTRAVENOUS at 03:10

## 2022-10-19 RX ADMIN — PHENYLEPHRINE HYDROCHLORIDE 100 MCG: 10 INJECTION INTRAVENOUS at 03:10

## 2022-10-19 RX ADMIN — MUPIROCIN: 20 OINTMENT TOPICAL at 10:10

## 2022-10-19 RX ADMIN — OXYCODONE 5 MG: 5 TABLET ORAL at 06:10

## 2022-10-19 RX ADMIN — ONDANSETRON 4 MG: 2 INJECTION, SOLUTION INTRAMUSCULAR; INTRAVENOUS at 03:10

## 2022-10-19 RX ADMIN — PROPOFOL 140 MG: 10 INJECTION, EMULSION INTRAVENOUS at 03:10

## 2022-10-19 RX ADMIN — CEFAZOLIN SODIUM 2 G: 1 INJECTION, POWDER, FOR SOLUTION INTRAMUSCULAR; INTRAVENOUS at 03:10

## 2022-10-19 RX ADMIN — FENTANYL CITRATE 50 MCG: 50 INJECTION, SOLUTION INTRAMUSCULAR; INTRAVENOUS at 04:10

## 2022-10-19 RX ADMIN — SODIUM CHLORIDE, SODIUM LACTATE, POTASSIUM CHLORIDE, AND CALCIUM CHLORIDE: .6; .31; .03; .02 INJECTION, SOLUTION INTRAVENOUS at 11:10

## 2022-10-19 RX ADMIN — FENTANYL CITRATE 50 MCG: 0.05 INJECTION, SOLUTION INTRAMUSCULAR; INTRAVENOUS at 03:10

## 2022-10-19 RX ADMIN — MIDAZOLAM 2 MG: 1 INJECTION INTRAMUSCULAR; INTRAVENOUS at 03:10

## 2022-10-19 RX ADMIN — MIDAZOLAM HYDROCHLORIDE 1 MG: 1 INJECTION, SOLUTION INTRAMUSCULAR; INTRAVENOUS at 03:10

## 2022-10-19 RX ADMIN — ACETAMINOPHEN 1000 MG: 10 INJECTION, SOLUTION INTRAVENOUS at 03:10

## 2022-10-19 NOTE — DISCHARGE SUMMARY
[facility] - Surgery  Discharge Note  Short Stay    Procedure(s) (LRB):  ORIF, ANKLE (Left)  FIXATION, SYNDESMOSIS, ANKLE (Left)      OUTCOME: Patient tolerated treatment/procedure well without complication and is now ready for discharge.    DISPOSITION: Home or Self Care    FINAL DIAGNOSIS:  <principal problem not specified>    FOLLOWUP: In clinic    DISCHARGE INSTRUCTIONS:  No discharge procedures on file.     TIME SPENT ON DISCHARGE: 15 minutes

## 2022-10-19 NOTE — ANESTHESIA POSTPROCEDURE EVALUATION
Anesthesia Post Evaluation    Patient: Cindy Ramirez    Procedure(s) Performed: Procedure(s) (LRB):  ORIF, ANKLE (Left)  FIXATION, SYNDESMOSIS, ANKLE (Left)    Final Anesthesia Type: general      Patient location during evaluation: PACU  Patient participation: Yes- Able to Participate  Level of consciousness: awake and alert and oriented  Post-procedure vital signs: reviewed and stable  Pain management: adequate  Airway patency: patent    PONV status at discharge: No PONV  Anesthetic complications: no      Cardiovascular status: blood pressure returned to baseline and stable  Respiratory status: unassisted and spontaneous ventilation  Hydration status: euvolemic  Follow-up not needed.          Vitals Value Taken Time   /75 10/19/22 1750   Temp 36.2 °C (97.2 °F) 10/19/22 1744   Pulse 76 10/19/22 1750   Resp 12 10/19/22 1750   SpO2 95 % 10/19/22 1750         Event Time   Out of Recovery 17:51:00         Pain/Joselin Score: Pain Rating Prior to Med Admin: 5 (10/19/2022  3:03 PM)  Joselin Score: 9 (10/19/2022  5:52 PM)

## 2022-10-19 NOTE — INTERVAL H&P NOTE
The patient has been examined and the H&P has been reviewed:    I concur with the findings and no changes have occurred since H&P was written.    Surgery risks, benefits and alternative options discussed and understood by patient/family.  Left ankle edema improved.    Allen Mauricio MD        There are no hospital problems to display for this patient.

## 2022-10-19 NOTE — TRANSFER OF CARE
"Anesthesia Transfer of Care Note    Patient: Cindy Ramirez    Procedure(s) Performed: Procedure(s) (LRB):  ORIF, ANKLE (Left)  FIXATION, SYNDESMOSIS, ANKLE (Left)    Patient location: PACU    Anesthesia Type: general    Transport from OR: Transported from OR on room air with adequate spontaneous ventilation    Post pain: adequate analgesia    Post assessment: no apparent anesthetic complications and tolerated procedure well    Post vital signs: stable    Level of consciousness: awake and sedated    Nausea/Vomiting: no nausea/vomiting    Complications: none    Transfer of care protocol was followed      Last vitals:   Visit Vitals  /77   Pulse 72   Temp 36.7 °C (98 °F) (Temporal)   Resp 17   Ht 5' 3" (1.6 m)   Wt 54.4 kg (120 lb)   SpO2 100%   Breastfeeding No   BMI 21.26 kg/m²     "

## 2022-10-19 NOTE — PROGRESS NOTES
Updated , Baldemar, in person in waiting room. States patient has wheelchair and walker at home with plans to obtain knee scooter. Stated patient is unsteady on crutches and will ambulate with wheelchair and walker as needed in house. Non-weight bearing status reinforced. Verbalized understanding.

## 2022-10-19 NOTE — ANESTHESIA PREPROCEDURE EVALUATION
10/19/2022  Cindy Ramirez is a 72 y.o., female.      Pre-op Assessment    I have reviewed the Patient Summary Reports.     I have reviewed the Nursing Notes. I have reviewed the NPO Status.   I have reviewed the Medications.     Review of Systems  Anesthesia Hx:  No problems with previous Anesthesia    Social:  Non-Smoker    Hematology/Oncology:         -- Cancer in past history ([facility]):   Cardiovascular:   Hypertension, well controlled hyperlipidemia    Pulmonary:   Asthma asymptomatic and mild    Renal/:  Renal/ Normal     Musculoskeletal:   Arthritis  Cervical fusion   Neurological:  Neurology Normal Unilateral facial paralysis   Endocrine:  Endocrine Normal    Psych:   Psychiatric History depression          Physical Exam  General: Well nourished, Cooperative, Alert and Oriented    Airway:  Mallampati: II   Mouth Opening: Normal  TM Distance: Normal  Neck ROM: Normal ROM        Anesthesia Plan  Type of Anesthesia, risks & benefits discussed:    Anesthesia Type: Gen ETT, Gen Supraglottic Airway, Gen Natural Airway, MAC  Intra-op Monitoring Plan: Standard ASA Monitors  Post Op Pain Control Plan: multimodal analgesia  Induction:  IV  Airway Plan: Direct, Video and Fiberoptic, Post-Induction  Informed Consent: Informed consent signed with the Patient and all parties understand the risks and agree with anesthesia plan.  All questions answered.   ASA Score: 2    Ready For Surgery From Anesthesia Perspective.     .

## 2022-10-19 NOTE — BRIEF OP NOTE
[facility] - Surgery  Brief Operative Note  Cardiology    SUMMARY     Surgery Date: 10/19/2022     Surgeon(s) and Role:     * Allen Mauricio MD - Primary    Assisting Surgeon: None    Pre-op Diagnosis:  Ankle fracture, bimalleolar, closed, left, initial encounter [S82.842A]  Ankle syndesmosis disruption, left, initial encounter [S93.432A]    Post-op Diagnosis: Post-Op Diagnosis Codes:     * Ankle fracture, bimalleolar, closed, left, initial encounter [S82.842A]     * Ankle syndesmosis disruption, left, initial encounter [S93.432A]    Procedure(s) (LRB):  ORIF, ANKLE (Left)  FIXATION, SYNDESMOSIS, ANKLE (Left)    Implants: Arthrex 4-hole distal fibula plate. 3.5mm cortical screws x 2. 2.7mm locking screws x 3. 3.5mm trans-syndesmotic screw x 1. 4.0mm partially threaded cannulated screws x 2.    Operative Findings: Short spiral fracture of the lateral malleolus with impaction. Bony avulsion of the AITFL. Overall poor bone quality.    Estimated Blood Loss: <5mL.         Specimens:   Specimen (24h ago, onward)      None

## 2022-10-19 NOTE — OR NURSING
Left popliteal and adductor canal single shot block complete per Dr. Duenas with Anesthesia. This RN at bedside to assist Dr. Duenas. Pt tolerated well. See Anesthesia record for procedural notes. See flowsheet and MAR for vitals and medications. Monitors in place, alarms audible. VSS. etCO2 monitoring in place. Resp even, unlabored. Skin warm, dry. Pt in NAD. Pt awaiting transport to OR. Family at bedside. Bed in lowest, locked position. Side rails up x2. Call light within reach.

## 2022-10-19 NOTE — PLAN OF CARE
Patient is being discharged. Patient remained free from falls, injuries, or accidents during stay. Patient education provided by this nurse and has been given discharge paperwork containing follow up orders and education.

## 2022-10-20 NOTE — OP NOTE
Pre-Operative Diagnosis:  Displaced Left trimalleolar ankle fracture.  Disruption of Left ankle syndesmotic complex.     Post-Operative Diagnosis:  Displaced Left trimalleolar ankle fracture.  Disruption of Left ankle syndesmotic complex.     Procedures:  Left trimalleolar ankle fracture open reduction internal fixation without posterior lip fixation.  Open reduction internal fixation of syndesmotic disruption.     Surgeon: Allen Mauricio MD    Assistant: CRISTY Gamez     Anesthesia: General with regional block.     Estimated Blood Loss: <5mL.     Drains: None.     Findings: Clear space widening with stress after lateral and medial malleolar fixation.      Implants: Arthrex 4-hole distal fibula plate. Locking screws x 3 distal. Cortical screws x 2 proximal. 3.5mm transsyndesmotic screw x 1. 4.0mm partially thread shanon screw x 2.      Operative Indication:  Cindy Ramirez is a 72 y.o. female who sustained a ground level fall last week. Patient endorses immediate ankle pain, swelling, and inability to bear weight.    Operative versus non operative treatment options were discussed.  Risks and benefits were described.  These include but are not limited to bleeding; infection; damage to surrounding nerves or vessels; persistent pain, stiffness; nonunion; malunion; post-traumatic arthritis; need for additional procedures; amputation; blood clots; pulmonary embolus; cardiac events; stroke; and the general risks of anesthesia including anesthetic death. We also reviewed postop protocol as well as limitations and expectations. Patient understands and desires to proceed with surgical intervention. Consent was obtained and the Left lower leg was marked.     Operative Procedure:  The patient was transferred to the operating room, transferred to the OR table in a supine position then placed under general endotracheal anesthesia by the anesthesiology service. All bony prominences were appropriately padded.  Patient  was secured to the table.  A nonsterile tourniquet was placed on the Left thigh.  An SCD was placed on the contralateral lower limb.  The patient was prepped and draped in usual sterile fashion.  A time-out was then called.  The patient, procedure, surgical site was verified and everyone was in agreement.  Preoperative IV antibiotics were administered. The surgical extremity was exsanguinated with an esmarch bandage and the tourniquet was inflated to 300mmHg.  An approximately 6 cm incision was made using a direct lateral approach over the distal fibula using a #15 Blade.  Dissection was taken deep to the level of the fracture site.  The superficial peroneal nerve was identified proximally and protected throughout the duration of the procedure.  After gaining adequate exposure with a combination of both blunt and sharp dissection, the spiral fracture was debrided satisfactorily.  This was then reduced with a combination of bony tenaculums.  K-wires were used for provisional fixation.  After confirming anatomic reduction both under direct visualization and fluoroscopy, a 3.0 mm cortical screw was placed in a lag by technique fashion with adequate purchase.    Attention was then turned to lateral based distal fibular plate placement.  A 4 hole plate was chosen.  After adequate position was confirmed under fluoroscopy, this was fixed proximally with cortical screws and distally with locking screws.  Attention was then turned to the medial malleolus where an approximately 3 cm curvilinear incision was made over the anterior 3rd.  Dissection was taken deep down the level of the saphenous nerve and vein.  These were carefully dissected and retracted throughout the duration of the procedure.  The medial malleolus fracture site was adequately debrided. After gaining anatomic reduction using combination of bony tenaculum and a dental pick, K-wires x2 were then placed.  After confirming satisfactory K-wire placement under  fluoroscopy, these were overdrilled and 4.0 mm cannulated screws were then placed and tightened a sequential manner.  Overall satisfactory reduction and bony apposition was appreciated.    While the posterior malleolar fracture fragment was appropriately reduced at the articular margin, upon repeat stress testing there was some degree of residual clear space widening.  At this time decision was made to a proceed with syndesmotic reduction and fixation.  A periarticular syndesmotic clamp was then placed at the level just proximal to the tibiotalar joint.  The medial marli was placed at the anterior and central 1/3 junction.  After confirming reduction of the distal fibula within the incisura, a 3.5mm cortical trans-syndesmotic was then placed with good purchase.  The reduction clamp was removed just prior to final cinching. Final fluoroscopy shots were taken confirming satisfactory reduction and stable fixation. The tourniquet was let down and hemostasis was obtained.  All wounds were copiously irrigated and closed in standard layered fashion including 2-0 Vicryl deep, 3-0 Monocryl on the paratenon, 3-0 Monocryl subcuticularly, and 3-0 nylon on the skin.  Sterile dressings including Xeroform, 4 x 4 gauze, Webril, and a well-padded short leg splint in neutral dorsiflexion.  Patient was reversed from anesthesia and transferred to recovery in stable condition.     Allen Mauricio MD

## 2022-10-26 ENCOUNTER — PATIENT MESSAGE (OUTPATIENT)
Dept: ORTHOPEDICS | Facility: CLINIC | Age: 72
End: 2022-10-26
Payer: MEDICARE

## 2022-10-27 DIAGNOSIS — M79.672 LEFT FOOT PAIN: Primary | ICD-10-CM

## 2022-10-27 DIAGNOSIS — M25.572 ACUTE LEFT ANKLE PAIN: ICD-10-CM

## 2022-10-28 RX ORDER — OXYCODONE AND ACETAMINOPHEN 5; 325 MG/1; MG/1
1 TABLET ORAL EVERY 6 HOURS PRN
Qty: 28 TABLET | Refills: 0 | Status: SHIPPED | OUTPATIENT
Start: 2022-10-28 | End: 2022-11-04

## 2022-11-01 ENCOUNTER — HOSPITAL ENCOUNTER (OUTPATIENT)
Dept: RADIOLOGY | Facility: HOSPITAL | Age: 72
Discharge: HOME | End: 2022-11-01
Attending: ORTHOPAEDIC SURGERY
Payer: MEDICARE

## 2022-11-01 DIAGNOSIS — M25.572 ACUTE LEFT ANKLE PAIN: ICD-10-CM

## 2022-11-01 PROCEDURE — 73610 X-RAY EXAM OF ANKLE: CPT | Mod: 26,LT,, | Performed by: RADIOLOGY

## 2022-11-01 PROCEDURE — 73610 XR ANKLE COMPLETE 3 VIEW LEFT: ICD-10-PCS | Mod: 26,LT,, | Performed by: RADIOLOGY

## 2022-11-01 PROCEDURE — 73610 X-RAY EXAM OF ANKLE: CPT | Mod: TC,FY,PO,LT

## 2022-11-03 ENCOUNTER — OFFICE VISIT (OUTPATIENT)
Dept: ORTHOPEDICS | Facility: CLINIC | Age: 72
End: 2022-11-03
Payer: MEDICARE

## 2022-11-03 ENCOUNTER — HOSPITAL ENCOUNTER (OUTPATIENT)
Dept: RADIOLOGY | Facility: HOSPITAL | Age: 72
Discharge: HOME | End: 2022-11-03
Attending: ORTHOPAEDIC SURGERY
Payer: MEDICARE

## 2022-11-03 VITALS — BODY MASS INDEX: 21.26 KG/M2 | HEIGHT: 63 IN | WEIGHT: 120 LBS

## 2022-11-03 DIAGNOSIS — S93.432A ANKLE SYNDESMOSIS DISRUPTION, LEFT, INITIAL ENCOUNTER: Primary | ICD-10-CM

## 2022-11-03 DIAGNOSIS — S82.842A ANKLE FRACTURE, BIMALLEOLAR, CLOSED, LEFT, INITIAL ENCOUNTER: ICD-10-CM

## 2022-11-03 DIAGNOSIS — M25.572 ACUTE LEFT ANKLE PAIN: ICD-10-CM

## 2022-11-03 PROCEDURE — 99999 PR PBB SHADOW E&M-EST. PATIENT-LVL III: CPT | Mod: PBBFAC,,, | Performed by: ORTHOPAEDIC SURGERY

## 2022-11-03 PROCEDURE — 99024 POSTOP FOLLOW-UP VISIT: CPT | Mod: POP,,, | Performed by: ORTHOPAEDIC SURGERY

## 2022-11-03 PROCEDURE — 99999 PR PBB SHADOW E&M-EST. PATIENT-LVL III: ICD-10-PCS | Mod: PBBFAC,,, | Performed by: ORTHOPAEDIC SURGERY

## 2022-11-03 PROCEDURE — 73610 X-RAY EXAM OF ANKLE: CPT | Mod: 26,LT,, | Performed by: RADIOLOGY

## 2022-11-03 PROCEDURE — 99024 PR POST-OP FOLLOW-UP VISIT: ICD-10-PCS | Mod: POP,,, | Performed by: ORTHOPAEDIC SURGERY

## 2022-11-03 PROCEDURE — 73610 X-RAY EXAM OF ANKLE: CPT | Mod: TC,PO,LT

## 2022-11-03 PROCEDURE — 99213 OFFICE O/P EST LOW 20 MIN: CPT | Mod: PBBFAC,PN | Performed by: ORTHOPAEDIC SURGERY

## 2022-11-03 PROCEDURE — 73610 XR ANKLE COMPLETE 3 VIEW LEFT: ICD-10-PCS | Mod: 26,LT,, | Performed by: RADIOLOGY

## 2022-11-04 NOTE — PROGRESS NOTES
Status/Diagnosis: Displaced Left trimalleolar ankle fracture.  Date of Surgery: 10/19/2022  Date of Injury: 10/11/2022  Return visit: 11/11  X-rays on Return: None    Chief Complaint:   Chief Complaint   Patient presents with    Left Ankle - Post-op Evaluation     Present History:  Cindy Ramirez is a 72 y.o. female who presents today today for her 1st postoperative clinic visit.  Overall doing well with minimal complaints of pain.  Reports compliance with nonweightbearing status, currently using a wheelchair.  Still with moderate degree of swelling at the surgical site.  Taking aspirin as prescribed for DVT prophylaxis.  Denies any drainage, fever, chills.      Past Medical History:   Diagnosis Date    Arthritis     Asthma     Basal cell carcinoma 2014    right cheek    Cataract     Hyperlipidemia     Hypertension     Insomnia        Past Surgical History:   Procedure Laterality Date    BACK SURGERY      CATARACT EXTRACTION W/  INTRAOCULAR LENS IMPLANT Right 08/22/2019    +12.5    COLONOSCOPY N/A 1/8/2020    Procedure: COLONOSCOPY;  Surgeon: Jameson Tovar MD;  Location: [facility];  Service: Endoscopy;  Laterality: N/A;    EYE SURGERY      cataract ou    FIXATION OF SYNDESMOSIS OF ANKLE Left 10/19/2022    Procedure: FIXATION, SYNDESMOSIS, ANKLE;  Surgeon: Allen Mauricio MD;  Location: [facility] OR;  Service: Orthopedics;  Laterality: Left;    NECK SURGERY  2000    corpectomy    OPEN REDUCTION AND INTERNAL FIXATION (ORIF) OF INJURY OF ANKLE Left 10/19/2022    Procedure: ORIF, ANKLE;  Surgeon: Allen Mauricio MD;  Location: [facility] OR;  Service: Orthopedics;  Laterality: Left;    SPINAL FUSION      TONSILLECTOMY      tonsils         Current Outpatient Medications   Medication Sig    albuterol (PROAIR HFA) 90 mcg/actuation inhaler Inhale 2 puffs into the lungs every 4 (four) hours as needed. Rescue    albuterol (PROVENTIL/VENTOLIN HFA) 90 mcg/actuation inhaler Inhale 2 puffs into the lungs every 6 (six) hours as  needed for Wheezing or Shortness of Breath. Rescue    aspirin (ECOTRIN) 81 MG EC tablet Take 81 mg by mouth once daily.    azelastine (ASTELIN) 137 mcg (0.1 %) nasal spray 1 spray (137 mcg total) by Nasal route 2 (two) times daily.    calcium-vitamin D3 (OS-SHABANA 500 + D3) 500 mg-5 mcg (200 unit) per tablet Take 1 tablet by mouth 2 (two) times daily with meals.    EScitalopram oxalate (LEXAPRO) 5 MG Tab Take 1 tablet (5 mg total) by mouth once daily.    lisinopriL (PRINIVIL,ZESTRIL) 30 MG tablet Take 1 tablet (30 mg total) by mouth once daily.    multivitamin capsule Take 1 capsule by mouth once daily.    oxyCODONE-acetaminophen (PERCOCET) 5-325 mg per tablet Take 1 tablet by mouth every 6 (six) hours as needed for Pain.    promethazine-dextromethorphan (PROMETHAZINE-DM) 6.25-15 mg/5 mL Syrp Take 5 mLs by mouth every 4 (four) hours as needed (cough).     No current facility-administered medications for this visit.       Review of patient's allergies indicates:   Allergen Reactions    Penicillins      Other reaction(s): Unknown       Family History   Problem Relation Age of Onset    Hypertension Mother     Macular degeneration Mother     Hypertension Father     Cancer Father         prostate    Pancreatic cancer Maternal Grandmother     Heart failure Maternal Grandfather     Dementia Paternal Grandmother     Breast cancer Maternal Aunt        Social History     Socioeconomic History    Marital status:    Occupational History     Employer: [employer]     Comment: retired (08/30/2015)   Tobacco Use    Smoking status: Never     Passive exposure: Never    Smokeless tobacco: Never   Substance and Sexual Activity    Alcohol use: Yes     Alcohol/week: 7.0 standard drinks     Types: 7 Glasses of wine per week    Drug use: No    Sexual activity: Yes     Partners: Male     Birth control/protection: None     Social Determinants of Health     Financial Resource Strain: Low Risk     Difficulty of Paying  Living Expenses: Not hard at all   Food Insecurity: No Food Insecurity    Worried About Running Out of Food in the Last Year: Never true    Ran Out of Food in the Last Year: Never true   Transportation Needs: No Transportation Needs    Lack of Transportation (Medical): No    Lack of Transportation (Non-Medical): No   Physical Activity: Inactive    Days of Exercise per Week: 0 days    Minutes of Exercise per Session: 0 min   Stress: Stress Concern Present    Feeling of Stress : Very much   Social Connections: Moderately Integrated    Frequency of Communication with Friends and Family: More than three times a week    Frequency of Social Gatherings with Friends and Family: More than three times a week    Attends Cheondoism Services: More than 4 times per year    Active Member of Clubs or Organizations: No    Attends Club or Organization Meetings: Never    Marital Status:    Housing Stability: Low Risk     Unable to Pay for Housing in the Last Year: No    Number of Places Lived in the Last Year: 1    Unstable Housing in the Last Year: No       Physical exam:  There were no vitals filed for this visit.  Body mass index is 21.26 kg/m².  General: In no apparent distress; well developed and well nourished.  HEENT: normocephalic; atraumatic.  Cardiovascular: regular rate.  Respiratory: no increased work of breathing.  Musculoskeletal:   Inspection:  Surgical sites healing well nylon sutures in place.  Moderate residual swelling ecchymosis about the left ankle.  No drainage, warmth, erythema.  No signs or symptoms of infection.  Ankle range of motion from 0-30 degrees plantar flexion.  Gross motor and sensory function intact.  Palpable pedal pulse.                   Imaging Studies/Outside documentation:  I have ordered/reviewed/interpreted the following images/outside documentation:  1. NWB 3-views Left ankle:  Patient is status post bimalleolar ankle fracture ORIF with trans syndesmotic screw fixation.  Overall  alignment well maintained.  Congruent ankle mortise.  No evidence of implant loosening or failure.        Assessment:  Cindy Ramirez is a 72 y.o. female with acute displaced left trimalleolar ankle fracture.  Date of injury:  10/11/2022.  STATUS POST Trimalleolar ankle fracture and syndesmosis ORIF on 10/19/2022.     Plan:   Clinical and radiographic findings were discussed with the patient and her  who accompanies her today.  Due to residual swelling, will retain sutures from now.  We will place Betadine pain along the lateral based incision, along with new sterile dressings and a well-padded short-leg splint.  Patient remain strict nonweightbearing.  Re-emphasized the importance of elevation.  Return to clinic on 11/11 for repeat evaluation and likely suture removal.  No new x-rays.    This note was created using voice recognition software and may contain grammatical errors.

## 2022-11-11 ENCOUNTER — OFFICE VISIT (OUTPATIENT)
Dept: ORTHOPEDICS | Facility: CLINIC | Age: 72
End: 2022-11-11
Payer: MEDICARE

## 2022-11-11 VITALS — WEIGHT: 120 LBS | BODY MASS INDEX: 21.26 KG/M2 | HEIGHT: 63 IN

## 2022-11-11 DIAGNOSIS — S93.432A ANKLE SYNDESMOSIS DISRUPTION, LEFT, INITIAL ENCOUNTER: Primary | ICD-10-CM

## 2022-11-11 DIAGNOSIS — S82.842A ANKLE FRACTURE, BIMALLEOLAR, CLOSED, LEFT, INITIAL ENCOUNTER: ICD-10-CM

## 2022-11-11 PROCEDURE — 99024 PR POST-OP FOLLOW-UP VISIT: ICD-10-PCS | Mod: POP,,, | Performed by: ORTHOPAEDIC SURGERY

## 2022-11-11 PROCEDURE — 99999 PR PBB SHADOW E&M-EST. PATIENT-LVL III: CPT | Mod: PBBFAC,,, | Performed by: ORTHOPAEDIC SURGERY

## 2022-11-11 PROCEDURE — 99213 OFFICE O/P EST LOW 20 MIN: CPT | Mod: PBBFAC,PN | Performed by: ORTHOPAEDIC SURGERY

## 2022-11-11 PROCEDURE — 99999 PR PBB SHADOW E&M-EST. PATIENT-LVL III: ICD-10-PCS | Mod: PBBFAC,,, | Performed by: ORTHOPAEDIC SURGERY

## 2022-11-11 PROCEDURE — 99024 POSTOP FOLLOW-UP VISIT: CPT | Mod: POP,,, | Performed by: ORTHOPAEDIC SURGERY

## 2022-11-11 NOTE — PROGRESS NOTES
Status/Diagnosis: Displaced Left trimalleolar ankle fracture.  Date of Surgery: 10/19/2022  Date of Injury: 10/11/2022  Return visit: 3 weeks  X-rays on Return: NWB 3-views Left ankle    Chief Complaint:   Chief Complaint   Patient presents with    Left Ankle - Post-op Evaluation     Present History:  Cindy Ramirez is a 72 y.o. female who presents for repeat evaluation and wound check.  Swelling significantly improved today.  Minimal pain.  Not requiring any narcotic pain medication.  Taking aspirin as prescribed.  Denies any drainage, fever, chills. Reports compliance with nonweightbearing status.      Past Medical History:   Diagnosis Date    Arthritis     Asthma     Basal cell carcinoma 2014    right cheek    Cataract     Hyperlipidemia     Hypertension     Insomnia        Past Surgical History:   Procedure Laterality Date    BACK SURGERY      CATARACT EXTRACTION W/  INTRAOCULAR LENS IMPLANT Right 08/22/2019    +12.5    COLONOSCOPY N/A 1/8/2020    Procedure: COLONOSCOPY;  Surgeon: Jameson Tovar MD;  Location: [facility] ENDO;  Service: Endoscopy;  Laterality: N/A;    EYE SURGERY      cataract ou    FIXATION OF SYNDESMOSIS OF ANKLE Left 10/19/2022    Procedure: FIXATION, SYNDESMOSIS, ANKLE;  Surgeon: Allen Mauricio MD;  Location: [facility] OR;  Service: Orthopedics;  Laterality: Left;    NECK SURGERY  2000    corpectomy    OPEN REDUCTION AND INTERNAL FIXATION (ORIF) OF INJURY OF ANKLE Left 10/19/2022    Procedure: ORIF, ANKLE;  Surgeon: Allen Mauricio MD;  Location: [facility] OR;  Service: Orthopedics;  Laterality: Left;    SPINAL FUSION      TONSILLECTOMY      tonsils         Current Outpatient Medications   Medication Sig    albuterol (PROAIR HFA) 90 mcg/actuation inhaler Inhale 2 puffs into the lungs every 4 (four) hours as needed. Rescue    albuterol (PROVENTIL/VENTOLIN HFA) 90 mcg/actuation inhaler Inhale 2 puffs into the lungs every 6 (six) hours as needed for Wheezing or Shortness of Breath. Rescue     aspirin (ECOTRIN) 81 MG EC tablet Take 81 mg by mouth once daily.    azelastine (ASTELIN) 137 mcg (0.1 %) nasal spray 1 spray (137 mcg total) by Nasal route 2 (two) times daily.    calcium-vitamin D3 (OS-SHABANA 500 + D3) 500 mg-5 mcg (200 unit) per tablet Take 1 tablet by mouth 2 (two) times daily with meals.    EScitalopram oxalate (LEXAPRO) 5 MG Tab Take 1 tablet (5 mg total) by mouth once daily.    lisinopriL (PRINIVIL,ZESTRIL) 30 MG tablet Take 1 tablet (30 mg total) by mouth once daily.    multivitamin capsule Take 1 capsule by mouth once daily.    promethazine-dextromethorphan (PROMETHAZINE-DM) 6.25-15 mg/5 mL Syrp Take 5 mLs by mouth every 4 (four) hours as needed (cough).     No current facility-administered medications for this visit.       Review of patient's allergies indicates:   Allergen Reactions    Penicillins      Other reaction(s): Unknown       Family History   Problem Relation Age of Onset    Hypertension Mother     Macular degeneration Mother     Hypertension Father     Cancer Father         prostate    Pancreatic cancer Maternal Grandmother     Heart failure Maternal Grandfather     Dementia Paternal Grandmother     Breast cancer Maternal Aunt        Social History     Socioeconomic History    Marital status:    Occupational History     Employer: [employer]     Comment: retired (08/30/2015)   Tobacco Use    Smoking status: Never     Passive exposure: Never    Smokeless tobacco: Never   Substance and Sexual Activity    Alcohol use: Yes     Alcohol/week: 7.0 standard drinks     Types: 7 Glasses of wine per week    Drug use: No    Sexual activity: Yes     Partners: Male     Birth control/protection: None     Social Determinants of Health     Financial Resource Strain: Low Risk     Difficulty of Paying Living Expenses: Not hard at all   Food Insecurity: No Food Insecurity    Worried About Running Out of Food in the Last Year: Never true    Ran Out of Food in the Last Year:  Never true   Transportation Needs: No Transportation Needs    Lack of Transportation (Medical): No    Lack of Transportation (Non-Medical): No   Physical Activity: Inactive    Days of Exercise per Week: 0 days    Minutes of Exercise per Session: 0 min   Stress: Stress Concern Present    Feeling of Stress : Very much   Social Connections: Moderately Integrated    Frequency of Communication with Friends and Family: More than three times a week    Frequency of Social Gatherings with Friends and Family: More than three times a week    Attends Roman [employer] Services: More than 4 times per year    Active Member of Clubs or Organizations: No    Attends Club or Organization Meetings: Never    Marital Status:    Housing Stability: Low Risk     Unable to Pay for Housing in the Last Year: No    Number of Places Lived in the Last Year: 1    Unstable Housing in the Last Year: No       Physical exam:  There were no vitals filed for this visit.  Body mass index is 21.26 kg/m².  General: In no apparent distress; well developed and well nourished.  HEENT: normocephalic; atraumatic.  Cardiovascular: regular rate.  Respiratory: no increased work of breathing.  Musculoskeletal:   Inspection:  Surgical sites well healed nylon sutures in place.  Mild residual swelling but with significant improvement versus last clinic visit.  No drainage, warmth, erythema.  No signs or symptoms of infection.  Ankle range of motion from 0-30 degrees plantar flexion.  Gross motor and sensory function intact.  Palpable pedal pulse.                   Assessment:  Cindy Ramirez is a 72 y.o. female with acute displaced left trimalleolar ankle fracture.  Date of injury:  10/11/2022.  STATUS POST Trimalleolar ankle fracture and syndesmosis ORIF on 10/19/2022.     Plan:   Sutures removed and Steri-Strips placed today.  We will transition to a tall boot to allow for showers and home ankle range of motion exercises.  Patient to continue to wear for sleep.   Remain strict nonweightbearing.  Return to clinic in 3 weeks for repeat evaluation.    This note was created using voice recognition software and may contain grammatical errors.

## 2022-11-23 DIAGNOSIS — S93.432A ANKLE SYNDESMOSIS DISRUPTION, LEFT, INITIAL ENCOUNTER: Primary | ICD-10-CM

## 2022-12-02 ENCOUNTER — HOSPITAL ENCOUNTER (OUTPATIENT)
Dept: RADIOLOGY | Facility: HOSPITAL | Age: 72
Discharge: HOME | End: 2022-12-02
Attending: ORTHOPAEDIC SURGERY
Payer: MEDICARE

## 2022-12-02 ENCOUNTER — OFFICE VISIT (OUTPATIENT)
Dept: ORTHOPEDICS | Facility: CLINIC | Age: 72
End: 2022-12-02
Payer: MEDICARE

## 2022-12-02 VITALS — HEIGHT: 63 IN | BODY MASS INDEX: 21.26 KG/M2 | WEIGHT: 120 LBS

## 2022-12-02 DIAGNOSIS — S82.842A ANKLE FRACTURE, BIMALLEOLAR, CLOSED, LEFT, INITIAL ENCOUNTER: ICD-10-CM

## 2022-12-02 DIAGNOSIS — S93.432A ANKLE SYNDESMOSIS DISRUPTION, LEFT, INITIAL ENCOUNTER: ICD-10-CM

## 2022-12-02 DIAGNOSIS — S93.432A ANKLE SYNDESMOSIS DISRUPTION, LEFT, INITIAL ENCOUNTER: Primary | ICD-10-CM

## 2022-12-02 PROCEDURE — 73610 X-RAY EXAM OF ANKLE: CPT | Mod: 26,LT,, | Performed by: RADIOLOGY

## 2022-12-02 PROCEDURE — 99999 PR PBB SHADOW E&M-EST. PATIENT-LVL III: CPT | Mod: PBBFAC,,, | Performed by: ORTHOPAEDIC SURGERY

## 2022-12-02 PROCEDURE — 73610 X-RAY EXAM OF ANKLE: CPT | Mod: TC,PO,LT

## 2022-12-02 PROCEDURE — 99024 POSTOP FOLLOW-UP VISIT: CPT | Mod: POP,,, | Performed by: ORTHOPAEDIC SURGERY

## 2022-12-02 PROCEDURE — 73610 XR ANKLE COMPLETE 3 VIEW LEFT: ICD-10-PCS | Mod: 26,LT,, | Performed by: RADIOLOGY

## 2022-12-02 PROCEDURE — 99999 PR PBB SHADOW E&M-EST. PATIENT-LVL III: ICD-10-PCS | Mod: PBBFAC,,, | Performed by: ORTHOPAEDIC SURGERY

## 2022-12-02 PROCEDURE — 99024 PR POST-OP FOLLOW-UP VISIT: ICD-10-PCS | Mod: POP,,, | Performed by: ORTHOPAEDIC SURGERY

## 2022-12-02 PROCEDURE — 99213 OFFICE O/P EST LOW 20 MIN: CPT | Mod: PBBFAC,PN | Performed by: ORTHOPAEDIC SURGERY

## 2022-12-02 NOTE — PROGRESS NOTES
Status/Diagnosis: Displaced Left trimalleolar ankle fracture.  Date of Surgery: 10/19/2022  Date of Injury: 10/11/2022  Return visit: 4 weeks  X-rays on Return: WB 3-views Left ankle    Chief Complaint:   Chief Complaint   Patient presents with    Left Ankle - Post-op Evaluation     Present History:  Cindy Ramirez is a 72 y.o. female who presents for routine postop evaluation.  Overall doing well.  No pain on presentation today.  Reports compliance with nonweightbearing status.  Does not require pain medication.  Taking aspirin as prescribed.  Denies any drainage, fever, chills.      Past Medical History:   Diagnosis Date    Arthritis     Asthma     Basal cell carcinoma 2014    right cheek    Cataract     Hyperlipidemia     Hypertension     Insomnia        Past Surgical History:   Procedure Laterality Date    BACK SURGERY      CATARACT EXTRACTION W/  INTRAOCULAR LENS IMPLANT Right 08/22/2019    +12.5    COLONOSCOPY N/A 1/8/2020    Procedure: COLONOSCOPY;  Surgeon: Jameson Tovar MD;  Location: [facility] ENDO;  Service: Endoscopy;  Laterality: N/A;    EYE SURGERY      cataract ou    FIXATION OF SYNDESMOSIS OF ANKLE Left 10/19/2022    Procedure: FIXATION, SYNDESMOSIS, ANKLE;  Surgeon: Allen Mauricio MD;  Location: [facility] OR;  Service: Orthopedics;  Laterality: Left;    NECK SURGERY  2000    corpectomy    OPEN REDUCTION AND INTERNAL FIXATION (ORIF) OF INJURY OF ANKLE Left 10/19/2022    Procedure: ORIF, ANKLE;  Surgeon: Allen Mauricio MD;  Location: [facility] OR;  Service: Orthopedics;  Laterality: Left;    SPINAL FUSION      TONSILLECTOMY      tonsils         Current Outpatient Medications   Medication Sig    albuterol (PROAIR HFA) 90 mcg/actuation inhaler Inhale 2 puffs into the lungs every 4 (four) hours as needed. Rescue    albuterol (PROVENTIL/VENTOLIN HFA) 90 mcg/actuation inhaler Inhale 2 puffs into the lungs every 6 (six) hours as needed for Wheezing or Shortness of Breath. Rescue    aspirin (ECOTRIN) 81 MG  EC tablet Take 81 mg by mouth once daily.    azelastine (ASTELIN) 137 mcg (0.1 %) nasal spray 1 spray (137 mcg total) by Nasal route 2 (two) times daily.    calcium-vitamin D3 (OS-SHABANA 500 + D3) 500 mg-5 mcg (200 unit) per tablet Take 1 tablet by mouth 2 (two) times daily with meals.    EScitalopram oxalate (LEXAPRO) 5 MG Tab Take 1 tablet (5 mg total) by mouth once daily.    lisinopriL (PRINIVIL,ZESTRIL) 30 MG tablet Take 1 tablet (30 mg total) by mouth once daily.    multivitamin capsule Take 1 capsule by mouth once daily.    promethazine-dextromethorphan (PROMETHAZINE-DM) 6.25-15 mg/5 mL Syrp Take 5 mLs by mouth every 4 (four) hours as needed (cough).     No current facility-administered medications for this visit.       Review of patient's allergies indicates:   Allergen Reactions    Penicillins      Other reaction(s): Unknown       Family History   Problem Relation Age of Onset    Hypertension Mother     Macular degeneration Mother     Hypertension Father     Cancer Father         prostate    Pancreatic cancer Maternal Grandmother     Heart failure Maternal Grandfather     Dementia Paternal Grandmother     Breast cancer Maternal Aunt        Social History     Socioeconomic History    Marital status:    Occupational History     Employer: [employer]     Comment: retired (08/30/2015)   Tobacco Use    Smoking status: Never     Passive exposure: Never    Smokeless tobacco: Never   Substance and Sexual Activity    Alcohol use: Yes     Alcohol/week: 7.0 standard drinks     Types: 7 Glasses of wine per week    Drug use: No    Sexual activity: Yes     Partners: Male     Birth control/protection: None     Social Determinants of Health     Financial Resource Strain: Low Risk     Difficulty of Paying Living Expenses: Not hard at all   Food Insecurity: No Food Insecurity    Worried About Running Out of Food in the Last Year: Never true    Ran Out of Food in the Last Year: Never true    Transportation Needs: No Transportation Needs    Lack of Transportation (Medical): No    Lack of Transportation (Non-Medical): No   Physical Activity: Inactive    Days of Exercise per Week: 0 days    Minutes of Exercise per Session: 0 min   Stress: Stress Concern Present    Feeling of Stress : Very much   Social Connections: Moderately Integrated    Frequency of Communication with Friends and Family: More than three times a week    Frequency of Social Gatherings with Friends and Family: More than three times a week    Attends Rastafarian Services: More than 4 times per year    Active Member of Clubs or Organizations: No    Attends Club or Organization Meetings: Never    Marital Status:    Housing Stability: Low Risk     Unable to Pay for Housing in the Last Year: No    Number of Places Lived in the Last Year: 1    Unstable Housing in the Last Year: No       Physical exam:  There were no vitals filed for this visit.  Body mass index is 21.26 kg/m².  General: In no apparent distress; well developed and well nourished.  HEENT: normocephalic; atraumatic.  Cardiovascular: regular rate.  Respiratory: no increased work of breathing.  Musculoskeletal:   Inspection:  Surgical scars are well healed.  Mild residual swelling but improved versus last clinic visit.  No signs or symptoms of infection.  No drainage, warmth, erythema.  Ankle range of motion from 5° dorsiflexion to 40° plantar flexion. Gross motor and sensory function intact.  Palpable pedal pulse.                Nonweightbearing three views left ankle:  Status post bimalleolar ankle fracture ORIF with trans syndesmotic screw fixation.  Overall alignment well-maintained.  Interval callus formation present.  Congruent ankle mortise.  No evidence of implant loosening or failure.     Assessment:  Cindy Ramirez is a 72 y.o. female with acute displaced left trimalleolar ankle fracture.  Date of injury:  10/11/2022.  STATUS POST Trimalleolar ankle fracture and  syndesmosis ORIF on 10/19/2022.     Plan:   Clinical and radiographic findings were discussed.  At this time we will initiate physical therapy per protocol for slow, progressive weight-bearing.  Patient voiced that she is actually out of town next week does we will plan to begin the week of 12/12.  Patient will be 50% weight-bearing week 1, 75% weight-bearing week 2, and then 100% weight-bearing week 3 and thereafter.  Patient to remain in boot until next clinic visit.  May remove for sleep, hygiene, home ankle range of motion exercises.  May discontinue aspirin use once full weight-bearing in the boot.  Patient voiced understanding.  All questions were answered.  Return to clinic in 4 weeks for repeat evaluation.      This note was created using voice recognition software and may contain grammatical errors.

## 2022-12-19 ENCOUNTER — OFFICE VISIT (OUTPATIENT)
Dept: PULMONOLOGY | Facility: CLINIC | Age: 72
End: 2022-12-19
Payer: MEDICARE

## 2022-12-19 ENCOUNTER — CLINICAL SUPPORT (OUTPATIENT)
Dept: REHABILITATION | Facility: HOSPITAL | Age: 72
End: 2022-12-19
Attending: ORTHOPAEDIC SURGERY
Payer: MEDICARE

## 2022-12-19 DIAGNOSIS — J45.30 MILD PERSISTENT ASTHMA WITHOUT COMPLICATION: Primary | ICD-10-CM

## 2022-12-19 DIAGNOSIS — R26.9 ALTERED GAIT: ICD-10-CM

## 2022-12-19 DIAGNOSIS — S93.432A ANKLE SYNDESMOSIS DISRUPTION, LEFT, INITIAL ENCOUNTER: ICD-10-CM

## 2022-12-19 DIAGNOSIS — F32.0 CURRENT MILD EPISODE OF MAJOR DEPRESSIVE DISORDER WITHOUT PRIOR EPISODE: ICD-10-CM

## 2022-12-19 DIAGNOSIS — M25.572 ACUTE LEFT ANKLE PAIN: Primary | ICD-10-CM

## 2022-12-19 DIAGNOSIS — M25.675 JOINT STIFFNESS OF LEFT FOOT: ICD-10-CM

## 2022-12-19 DIAGNOSIS — I10 PRIMARY HYPERTENSION: ICD-10-CM

## 2022-12-19 DIAGNOSIS — R26.89 IMPAIRMENT OF BALANCE: ICD-10-CM

## 2022-12-19 DIAGNOSIS — R29.898 WEAKNESS OF LEFT LOWER EXTREMITY: ICD-10-CM

## 2022-12-19 DIAGNOSIS — S82.842A ANKLE FRACTURE, BIMALLEOLAR, CLOSED, LEFT, INITIAL ENCOUNTER: ICD-10-CM

## 2022-12-19 DIAGNOSIS — M25.672 STIFFNESS OF LEFT ANKLE JOINT: ICD-10-CM

## 2022-12-19 PROCEDURE — 99214 PR OFFICE/OUTPT VISIT, EST, LEVL IV, 30-39 MIN: ICD-10-PCS | Mod: S$PBB,ICN,, | Performed by: PHYSICIAN ASSISTANT

## 2022-12-19 PROCEDURE — 99212 OFFICE O/P EST SF 10 MIN: CPT | Mod: PBBFAC | Performed by: PHYSICIAN ASSISTANT

## 2022-12-19 PROCEDURE — 97162 PT EVAL MOD COMPLEX 30 MIN: CPT | Mod: PN

## 2022-12-19 PROCEDURE — 99999 PR PBB SHADOW E&M-EST. PATIENT-LVL II: CPT | Mod: PBBFAC,,, | Performed by: PHYSICIAN ASSISTANT

## 2022-12-19 PROCEDURE — 99214 OFFICE O/P EST MOD 30 MIN: CPT | Mod: S$PBB,ICN,, | Performed by: PHYSICIAN ASSISTANT

## 2022-12-19 PROCEDURE — 99999 PR PBB SHADOW E&M-EST. PATIENT-LVL II: ICD-10-PCS | Mod: PBBFAC,,, | Performed by: PHYSICIAN ASSISTANT

## 2022-12-19 PROCEDURE — 97140 MANUAL THERAPY 1/> REGIONS: CPT | Mod: PN

## 2022-12-19 RX ORDER — FLUTICASONE PROPIONATE 110 UG/1
1 AEROSOL, METERED RESPIRATORY (INHALATION) 2 TIMES DAILY
Qty: 12 G | Refills: 2 | Status: SHIPPED | OUTPATIENT
Start: 2022-12-19 | End: 2023-03-02

## 2022-12-19 NOTE — PROGRESS NOTES
"Subjective:       Patient ID: Cindy Ramirez is a 72 y.o. female.    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[employer], retired  Denies allergies  No SOB, fever, or chest pain  Has not needed albuterol today  History of childhood asthma triggered by dust and the cold air  Was told at urgent care there were "spots" on her lungs and she needs to see pulmonary - this is her main concern today    Asthma Control Test  In the past 4  weeks, how much of the time did your asthma keep you from getting as much done at work, school or at home?: Most of the time  During the past 4 weeks, how often have you had shortness of breath?: Once a day  During the past 4 weeks, how often did your asthma symptoms (wheezing, couging, shortness of breath, chest tightness or pain) wake you up at night or earlier than usual in the morning?: 4 or more nights a week  During the past 4 weeks, how often have you used your rescue inhaler or nebulizer medication (such as albuterol)?: 1 or 2 times per day  How would you rate your asthma control during the past 4 weeks?: Somewhat controlled  If your score is 19 or less, your asthma may not be under control: 10     Immunization History   Administered Date(s) Administered    COVID-19, MRNA, LN-S, PF (Pfizer) (Purple Cap) 03/10/2021, 03/31/2021    Pneumococcal Conjugate - 13 Valent 08/22/2016, 06/21/2019    Td - PF (ADULT) 09/04/2021    Tdap 08/22/2016      Tobacco Use: Low Risk     Smoking Tobacco Use: Never    Smokeless Tobacco Use: Never    Passive Exposure: Never      Past Medical History:   Diagnosis Date    Arthritis     Asthma     Basal cell carcinoma 2014    right cheek    Cataract     " Hyperlipidemia     Hypertension     Insomnia       Current Outpatient Medications on File Prior to Visit   Medication Sig Dispense Refill    albuterol (PROAIR HFA) 90 mcg/actuation inhaler Inhale 2 puffs into the lungs every 4 (four) hours as needed. Rescue 54 g 3    aspirin (ECOTRIN) 81 MG EC tablet Take 81 mg by mouth once daily.      calcium-vitamin D3 (OS-SHABANA 500 + D3) 500 mg-5 mcg (200 unit) per tablet Take 1 tablet by mouth 2 (two) times daily with meals.      EScitalopram oxalate (LEXAPRO) 5 MG Tab Take 1 tablet (5 mg total) by mouth once daily. 30 tablet 11    lisinopriL (PRINIVIL,ZESTRIL) 30 MG tablet Take 1 tablet (30 mg total) by mouth once daily. 90 each 3    multivitamin capsule Take 1 capsule by mouth once daily.      albuterol (PROVENTIL/VENTOLIN HFA) 90 mcg/actuation inhaler Inhale 2 puffs into the lungs every 6 (six) hours as needed for Wheezing or Shortness of Breath. Rescue 8 g 0    azelastine (ASTELIN) 137 mcg (0.1 %) nasal spray 1 spray (137 mcg total) by Nasal route 2 (two) times daily. 30 mL 0    promethazine-dextromethorphan (PROMETHAZINE-DM) 6.25-15 mg/5 mL Syrp Take 5 mLs by mouth every 4 (four) hours as needed (cough). 118 mL 0     No current facility-administered medications on file prior to visit.        Review of Systems   Constitutional:  Negative for fever, weight loss, appetite change and weakness.   HENT:  Negative for postnasal drip, rhinorrhea, sinus pressure, trouble swallowing and congestion.    Respiratory:  Positive for cough and wheezing. Negative for sputum production, choking, chest tightness, shortness of breath and dyspnea on extertion.    Cardiovascular:  Negative for chest pain and leg swelling.   Musculoskeletal:  Negative for arthralgias, gait problem and joint swelling.   Gastrointestinal:  Negative for nausea, vomiting and abdominal pain.   Neurological:  Negative for dizziness, weakness and headaches.   All other systems reviewed and are negative.    Objective:        There were no vitals filed for this visit.    Physical Exam   Constitutional: She is oriented to person, place, and time. She appears well-developed and well-nourished. No distress.   HENT:   Head: Normocephalic.   Nose: Nose normal.   Mouth/Throat: Oropharynx is clear and moist.   Cardiovascular: Normal rate and regular rhythm.   Pulmonary/Chest: Effort normal. No respiratory distress. She has no wheezes. She has no rhonchi. She has no rales.   Musculoskeletal:         General: No edema.      Cervical back: Normal range of motion and neck supple.   Neurological: She is alert and oriented to person, place, and time. Gait normal.   Skin: Skin is warm and dry.   Psychiatric: She has a normal mood and affect.   Vitals reviewed.  Personal Diagnostic Review    X-Ray Ankle Complete 3 View Left  Narrative: EXAMINATION:  XR ANKLE COMPLETE 3 VIEW LEFT    CLINICAL HISTORY:  Sprain of tibiofibular ligament of left ankle, initial encounter    TECHNIQUE:  AP, lateral and oblique views of the left ankle were performed.    COMPARISON:  Left ankle-11/03/2022    FINDINGS:  The bones are osteopenic.  There is Achilles insertion calcaneal enthesophyte formation.  There is a plantar calcaneal spur.  There are postoperative changes of ORIF of a bimalleolar left ankle fracture utilizing a lateral screw and plate construct, medial malleolar screws, and a syndesmotic screw.  No hardware complication.  There has been interval progression of healing when compared to the prior study.  No widening of the distal tibiofibular syndesmosis.  The ankle mortise width is symmetric.  No talar dome osteochondral lesion.  There is soft tissue swelling present over the anterior margin of the ankle/hindfoot  Impression: As above    Electronically signed by: Petr Guerra MD  Date:    12/02/2022  Time:    14:26          No flowsheet data found.      Assessment/Plan:       Problem List Items Addressed This Visit          Psychiatric    Current mild episode  of major depressive disorder without prior episode     Stable, F/u regularly with PCP              Pulmonary    Mild persistent asthma without complication - Primary    Relevant Medications    fluticasone propionate (FLOVENT HFA) 110 mcg/actuation inhaler    Other Relevant Orders    IGE    Spirometry with/without bronchodilator    Fraction of  Nitric Oxide    X-Ray Chest PA And Lateral       Cardiac/Vascular    HTN (hypertension)     controlled        Start flovent 1 puff BID, rinse mouth with water after each use. Chest X Ray clear, appears hyperinflated.   Follow up in about 4 weeks (around 2023) for labs today; alberto, feno, cxr next.    Discussed diagnosis, its evaluation, treatment and usual course. All questions answered.    Patient verbalized understanding of plan and left in no acute distress    Thank you for the courtesy of participating in the care of this patient    Elizabeth G Blough, PA-C [facility]

## 2022-12-20 NOTE — PATIENT INSTRUCTIONS
Education provided:   PT provided education and demonstration of HEP to include:   Gait training with single crutch with 50-75% weight bearing in boot Left Lower Extremity.  Weight shifting with scale with pt learning to keep weight below 90 lbs and pain minimal to none.  Education on Active Assist Range of Motion/Active Range of Motion as tolerated, scar tissue mobilization, desensitization, managing swelling with elevation/massage/exercises/compression.      Pt was instructed to perform these daily.  Pt was given instructions to stop use of HEP if there are any adverse reactions/responses and f/u with PT next treatment to discuss.    Home Exercises Provided:  Exercises were reviewed and Cindy was able to demonstrate them prior to the end of the session.  Cindy demonstrated fair  understanding of the education provided.   See EMR under Patient Instructions for exercises provided during therapy sessions.

## 2022-12-20 NOTE — PLAN OF CARE
[facility]   Physical Therapy Initial Evaluation     Date: 12/19/2022   Name: Cindy Ramirez  [facility] Number: [id number]    Therapy Diagnosis:   Encounter Diagnoses   Name Primary?    Ankle syndesmosis disruption, left, initial encounter     Ankle fracture, bimalleolar, closed, left, initial encounter     Acute left ankle pain Yes    Stiffness of left ankle joint     Joint stiffness of left foot     Altered gait     Impairment of balance     Weakness of left lower extremity      Physician: Allen Mauricio MD    Physician Orders: PT Eval and Treat   1-2 times per week for 6 to 8 weeks per ankle fracture protocol.  PT to start week of 12/12/22. 50% WB in boot week 1; 75% WB in boot week 2; 100% WB in boot week 3.  Active/Active assist ankle ROM. May start slow, gradual strengthening once full WB in boot. Modalities as needed.  Stay in boot until next Ortho clinic visit.  Medical Diagnosis from Referral: Ankle syndesmosis disruption, left, initial encounter [S93.432A], Ankle fracture, bimalleolar, closed, left, initial encounter [S82.842A]  Evaluation Date: 12/19/2022  Authorization Period Expiration: 12/2/2023  Plan of Care Expiration: 3/17/2023  Progress Note Due: 1/20/2023  Visit # / Visits authorized: 1 / 1   FOTO: Issued Visit #: 1/3, (capture on visit 1, 5, 10) first on 12/19/2022    Precautions: Standard and Weightbearing (see Dr goins), VIJI ESCALERA, procedure:  10/19/2022   Left trimalleolar ankle fracture open reduction internal fixation without posterior lip fixation.  Open reduction internal fixation of syndesmotic disruption.    1-2 times per week for 6 to 8 weeks per ankle fracture protocol.  PT to start week of 12/12/22. 50% WB in boot week 1; 75% WB in boot week 2; 100% WB in boot week 3.  Active/Active assist ankle ROM. May start slow, gradual strengthening once full WB in boot. Modalities as needed.  Stay in boot until next Ortho clinic visit.    Time In: 5:30  Time Out:  6:20  Total Appointment Time (timed & untimed codes): 50 minutes    SUBJECTIVE   Date of onset: 10/11/2022    DOS, procedure:  10/19/2022   Left trimalleolar ankle fracture open reduction internal fixation without posterior lip fixation.  Open reduction internal fixation of syndesmotic disruption.    History of current condition - Cindy reports: she slipped and fell on wet floor in the bathroom.  Her Left foot got caught under her.  She had surgery almost a week later on 10/19/2022.  Right after the surgery she had a lot of pain.  She was suppose to start PT last week but was out of town.  She did start some ROM movements in non-weight bearing last week on her own.      Falls: 1 fall in October due to slipping on wet floor    Imaging, bone scan films:   XR ANKLE COMPLETE 3 VIEW LEFT FINDINGS:  12/2/2022  The bones are osteopenic.  There is Achilles insertion calcaneal enthesophyte formation.  There is a plantar calcaneal spur.  There are postoperative changes of ORIF of a bimalleolar left ankle fracture utilizing a lateral screw and plate construct, medial malleolar screws, and a syndesmotic screw.  No hardware complication.  There has been interval progression of healing when compared to the prior study.  No widening of the distal tibiofibular syndesmosis.  The ankle mortise width is symmetric.  No talar dome osteochondral lesion.  There is soft tissue swelling present over the anterior margin of the ankle/hindfoot    Prior Therapy: none  Social History: Cindy reports she lives with her  and her son (temporary) in 1-story home with 1 step entry.   Occupation: retired  Prior Level of Function: no major limitations  Current Level of Function: non-weight bearing to PWB Left Lower Extremity since surgery.  In boot at all times with weight bearing.    Pain:  Current 2/10, worst 4/10, best 1/10   Location: front of ankle, tender at her ankle grossly, top of her midfoot   Description: Aching and  Tight  Aggravating Factors: trying to move it around with Active Assist Range of Motion/AROM  Easing Factors: rest    Patients goals: return to normal     Medical History:   Past Medical History:   Diagnosis Date    Arthritis     Asthma     Basal cell carcinoma 2014    right cheek    Cataract     Hyperlipidemia     Hypertension     Insomnia      Surgical History:   Cindy Ramirez  has a past surgical history that includes Neck surgery (2000); Spinal fusion; Back surgery; tonsils; Cataract extraction w/  intraocular lens implant (Right, 08/22/2019); Tonsillectomy; Eye surgery; Colonoscopy (N/A, 1/8/2020); Open reduction and internal fixation (ORIF) of injury of ankle (Left, 10/19/2022); and Fixation of syndesmosis of ankle (Left, 10/19/2022).    Medications:   Cindy has a current medication list which includes the following prescription(s): albuterol, albuterol, aspirin, azelastine, calcium-vitamin d3, escitalopram oxalate, fluticasone propionate, lisinopril, multivitamin, and promethazine-dextromethorphan.    Allergies:   Review of patient's allergies indicates:   Allergen Reactions    Penicillins      Other reaction(s): Unknown        OBJECTIVE     Gait:  walks with single crutch with boot on Left foot walking PWB in the clinic with step to gait pattern    Palpation:  Increased scar tissue restrictions noted over lateral incision site>medial.  Pitting edema in lower leg and foot.      Flexibility:    Gastroc:  Right= +10*  Left=   +4*  Joint mobility:  Some joint restrictions noted midfoot, talocrural joints.  More restrictions noted at subtalar and distal tib-fib joints.     Left  AROM/PROM    Ankle Dorsiflexion  2/6*    Ankle Plantarflexion  30/34*    Ankle Inversion  13/25*    Ankle Eversion  0/8*        Right  AROM/PROM    Ankle Dorsiflexion  20/25*    Ankle Plantarflexion  40/45*    Ankle Inversion  33/40*    Ankle Eversion  18/30*     Strength:    Left   Right    Ankle Dorsiflexion 4+/5 5/5    Ankle  Plantarflexion 4+/5 5/5    Ankle Inversion 4+/5 5/5    Ankle Eversion 4/5 5/5    Hip Abduction 4-/5 4/5   Hip Extension 4/5 4/5   Hip External Rotation  4+/5 4+/5   Knee Flexion and Extension  5/5  5/5     Single Limb Balance:  Not tested    Double heelraise:  Not tested    Single heelraise:    Not tested      Limitation/Restriction for FOTO LEFS Survey    Therapist reviewed FOTO scores for Cindy Ramirez on 12/19/2022.   FOTO documents entered into [employer] - see Media section.    Limitation Score: 52%.  Predicted 34%         TREATMENT     Total Treatment time (time-based codes) separate from Evaluation: 15 minutes      Cindy received the treatments listed below:      manual therapy techniques: for 15 minutes, including:  Scar tissue mobilization at medial and lateral incision sites, retrograde massage to foot/ankle/lower leg due to swelling.  Active Assist Range of Motion to ankle/foot/toes in all planes as tolerated    therapeutic exercises for 00 minutes including:  (up to 75% weight bearing in boot through week of 12/19/2022)  Toe flexion/extension x 20  Toe flexion with doming to toe extension with Eversion FROM as tolerated x 20  Ankle pumps x 20  Upper case alphabet x 2    Lower case alphabet x 2    Seated  Dorsiflexion taps x 20  Heel raises x 20  [place] pick ups into Eversion  [place] pick ups into Inversion  BAPS board ROM    Standing weight shifts with scale (90 lbs or less week of 12/19/2022 low to no pain c/o)    Plan for Next Visit:  Assess patients response to the first visit following IE, manual therapy intervention, therapeutic exercises, pt education, and HEP.  Assess alignment, joint and soft tissue mobility, flexibility, scar tissue, swelling, recruitment, control, balance, gait.  Manual therapy to address alignment, mobility, flexibility, tenderness, soft tissue restrictions and/or presence of trigger points.  Add stretching, stabilization, joint and soft tissue mobility, balance and  strengthening exercises as appropriate.  Modalities, Dry Needling if indicated.        PATIENT EDUCATION AND HOME EXERCISES     Education provided:   PT provided education and demonstration of HEP to include:   Gait training with single crutch with 50-75% weight bearing in boot Left Lower Extremity.  Weight shifting with scale with pt learning to keep weight below 90 lbs and pain minimal to none.  Education on Active Assist Range of Motion/Active Range of Motion as tolerated, scar tissue mobilization, desensitization, managing swelling with elevation/massage/exercises/compression.      Pt was instructed to perform these daily.  Pt was given instructions to stop use of HEP if there are any adverse reactions/responses and f/u with PT next treatment to discuss.    Home Exercises Provided:  Exercises were reviewed and Cindy was able to demonstrate them prior to the end of the session.  Cindy demonstrated fair  understanding of the education provided.   See EMR under Patient Instructions for exercises provided during therapy sessions.    ASSESSMENT     Cindy is a 72 y.o. female referred to outpatient Physical Therapy with a medical diagnosis of Ankle syndesmosis disruption, left, initial encounter [S93.432A], Ankle fracture, bimalleolar, closed, left, initial encounter [S82.842A].   At time of Initial Evaluation patient presents 2 months s/p Left trimalleolar ankle fracture open reduction internal fixation without posterior lip fixation and Open reduction internal fixation of syndesmotic disruption.  She is still in boot at all times with weight bearing and is allowed to progress from 50% to up to 75% PWB the week of 12/19/2022.  She has decreased joint and soft tissue mobility, scar tissue restrictions, swelling with pitting edema, decreased strength.  She has some increased soreness and discomfort with increasing her weight bearing over 84-85 lbs at time of Initial Evaluation.    Patient prognosis is fair to  good.   Patientt will benefit from skilled outpatient Physical Therapy to address the deficits stated above and in the chart below, provide patient /family education, and to maximize patientt's level of independence.     Plan of care discussed with patient: Yes  Patient's spiritual, cultural and educational needs considered and patient is agreeable to the plan of care and goals as stated below:     Anticipated Barriers for therapy: Schedule conflicts, transportation, pain, guarding, tolerance, endurance.  Joint, soft tissue and scar tissue restrictions following surgery and prolonged immobilization.    Medical Necessity is demonstrated by the following  History  Co-morbidities and personal factors that may impact the plan of care Co-morbidities:   advanced age, COPD/asthma, history of cancer, HTN, prior lumbar surgery, and spinal fusion, neck surgery, eye surgery    Personal Factors:   age     moderate   Examination  Body Structures and Functions, activity limitations and participation restrictions that may impact the plan of care Body Regions:   lower extremities    Body Systems:    ROM  strength  balance  gait  transfers  transitions  motor control  edema    Participation Restrictions:   Schedule conflicts, transportation, pain, guarding, tolerance, endurance.  Joint, scar tissue and soft tissue restrictions. Weight bearing precautions    Activity limitations:   Learning and applying knowledge  no deficits    General Tasks and Commands  no deficits    Communication  Can be [place] at times    Mobility  Limited weight bearing Left Lower Extremity in boot per Dr orders and post op protocol, limited by pain and stiffness, decreased balance     Self care  Limited weight bearing Left Lower Extremity in boot per Dr orders and post op protocol, limited by pain and stiffness, decreased balance     Domestic Life  Limited weight bearing Left Lower Extremity in boot per Dr orders and post op protocol, limited by pain and  stiffness, decreased balance     Interactions/Relationships  Limited weight bearing Left Lower Extremity in boot per Dr orders and post op protocol, limited by pain and stiffness, decreased balance     Life Areas  Limited weight bearing Left Lower Extremity in boot per Dr orders and post op protocol, limited by pain and stiffness, decreased balance     Community and Social Life  Limited weight bearing Left Lower Extremity in boot per Dr orders and post op protocol, limited by pain and stiffness, decreased balance          high   Clinical Presentation evolving clinical presentation with changing clinical characteristics moderate   Decision Making/ Complexity Score: moderate     Goals:  Short-Term: 4 weeks (1/20/2023)  Pt will improve Left ankle dorsiflexion PROM to > or = 10* to promote return to prior functional status.  Pt will improve left ankle plantarflexion PROM to > or = 37* to promote return to prior functional status.  Pt will improve left ankle inversion PROM to > or = 22* to promote return to prior functional status.  Pt will improve left ankle eversion PROM to > or = 10* to promote return to prior functional status.  Pt will be able to progress weight bearing with/without boot as needed with < 3-4/10 pain  Pt will demonstrate improved gait pattern as evidenced by good gait pattern with boot on with/without boot on Left Lower Extremity.  Pt will maintain pain levels < or = to 3-4/10 to help promote appropriate progression of PT, HEP, weight bearing and ADL's    Pt will be compliant with new home exercise program to assist with PT intervention and help allow appropriate progression through plan of care.    Long-Term: 12 weeks (3/17/2023)  Pt will improve left ankle AROM to WFL-WNL to allow return to normal activities of daily living.  Pt will improve left ankle strength to > or = 5/5 to allow performance of activities of daily living.  Pt will improve left knee strength to > or = 5/5 to allow performance of  activities of daily living.  Pt will improve bilateral hip strength to > or = 5-/5 to allow performance of activities of daily living.  Pt will display good reciprocating gait pattern without AD to improve QOL and allow return to prior functional status.  Pt will report < or = 2/10 pain during activities of daily living to improve QOL and allow return to prior functional status.  Pt will perform single limb balance on stable surface with appropriate balance reactions for > or = 30 seconds to allow safe return to prior level of function.  Pt will be able to negotiate walking stairs and on uneven surfaces with normal shoes, without AD with good balance and gait pattern.   Pt will be compliant and independent with HEP to allow and maintain better participation in normal activities  Pt will be able to resume normals ADL's, IADL's, previous activities.    PLAN   Plan of care Certification: 12/19/2022 to 3/17/2023.      Outpatient Physical Therapy 2 times weekly for 12 weeks to include the following interventions: Electrical Stimulation attended/unattended, Gait Training, Manual Therapy, Moist Heat/ Ice, Neuromuscular Re-ed, Orthotic Management and Training, Patient Education, Therapeutic Activities, Therapeutic Exercise, Ultrasound, and dry needling.     Lyle Barajas, PT      I CERTIFY THE NEED FOR THESE SERVICES FURNISHED UNDER THIS PLAN OF TREATMENT AND WHILE UNDER MY CARE   Physician's comments:     Physician's Signature: ___________________________________________________

## 2022-12-22 ENCOUNTER — CLINICAL SUPPORT (OUTPATIENT)
Dept: REHABILITATION | Facility: HOSPITAL | Age: 72
End: 2022-12-22
Payer: MEDICARE

## 2022-12-22 DIAGNOSIS — R26.89 IMPAIRMENT OF BALANCE: ICD-10-CM

## 2022-12-22 DIAGNOSIS — R26.9 ALTERED GAIT: ICD-10-CM

## 2022-12-22 DIAGNOSIS — M25.572 ACUTE LEFT ANKLE PAIN: Primary | ICD-10-CM

## 2022-12-22 DIAGNOSIS — M25.672 STIFFNESS OF LEFT ANKLE JOINT: ICD-10-CM

## 2022-12-22 DIAGNOSIS — R29.898 WEAKNESS OF LEFT LOWER EXTREMITY: ICD-10-CM

## 2022-12-22 DIAGNOSIS — M25.675 JOINT STIFFNESS OF LEFT FOOT: ICD-10-CM

## 2022-12-22 PROCEDURE — 97110 THERAPEUTIC EXERCISES: CPT | Mod: PN,CQ

## 2022-12-22 PROCEDURE — 97140 MANUAL THERAPY 1/> REGIONS: CPT | Mod: PN,CQ

## 2022-12-22 NOTE — PATIENT INSTRUCTIONS
Copyright © 7869-8948 [employer]    Copyright © 6954-3565 [employer]    Copyright © 6380-3165 [employer]    Copyright © 5980-8868 [employer]    Copyright © 2424-8675 [employer]

## 2022-12-22 NOTE — PROGRESS NOTES
[facility]   Physical Therapy Treatment Note     Name: Cindy Ramirez  Clinic Number: [id number]    Therapy Diagnosis:   Encounter Diagnoses   Name Primary?    Acute left ankle pain Yes    Stiffness of left ankle joint     Joint stiffness of left foot     Altered gait     Impairment of balance     Weakness of left lower extremity      Physician: Allen Mauricio MD    Visit Date: 12/22/2022    Physician Orders: PT Eval and Treat   1-2 times per week for 6 to 8 weeks per ankle fracture protocol.  PT to start week of 12/12/22. 50% WB in boot week 1; 75% WB in boot week 2; 100% WB in boot week 3.  Active/Active assist ankle ROM. May start slow, gradual strengthening once full WB in boot. Modalities as needed.  Stay in boot until next Ortho clinic visit.  Medical Diagnosis from Referral: Ankle syndesmosis disruption, left, initial encounter [S93.432A], Ankle fracture, bimalleolar, closed, left, initial encounter [S82.842A]  Evaluation Date: 12/19/2022  Authorization Period Expiration: 12/2/2023  Plan of Care Expiration: 3/17/2023  Progress Note Due: 1/20/2023  Visit # / Visits authorized: 1 / 1   FOTO: Issued Visit #: 1/3, (capture on visit 1, 5, 10) first on 12/19/2022     Precautions: Standard and Weightbearing (see Dr goins), VIJI ESCALERA, procedure:  10/19/2022   Left trimalleolar ankle fracture open reduction internal fixation without posterior lip fixation.  Open reduction internal fixation of syndesmotic disruption.     1-2 times per week for 6 to 8 weeks per ankle fracture protocol.  PT to start week of 12/12/22. 50% WB in boot week 1; 75% WB in boot week 2; 100% WB in boot week 3.  Active/Active assist ankle ROM. May start slow, gradual strengthening once full WB in boot. Modalities as needed.  Stay in boot until next Ortho clinic visit.    PTA Visit #: 1/5     Time In: 0747  Time Out: 0843  Total Billable Time: 56 minutes    SUBJECTIVE     Pt reports: woke with 2-3/10 left  "ankle pain this morning but it resolved with mobility. "Overdid it " yesterday while shopping at [employer]   She was compliant with home exercise program.  Response to previous treatment: mild soreness  Functional change: none stated (first visit after eval)    Pain: 2-3/10  Location: left ankle    OBJECTIVE     Objective Measures updated at progress report unless specified.     Treatment     Cindy received the treatments listed below:        manual therapy techniques: for 15 minutes, including:  Scar tissue mobilization at medial and lateral incision sites, retrograde massage to foot/ankle/lower leg due to swelling.  Active Assist Range of Motion to ankle/foot/toes in all planes as tolerated     therapeutic exercises for 41 minutes including:  (up to 75% weight bearing in boot through week of 12/19/2022)  Toe flexion/extension x 20  Toe splay x 20  Toe flexion with doming to toe extension with Eversion FROM as tolerated x 20  Ankle pumps x 20  Upper case alphabet x 2    Lower case alphabet x 2     Seated  LAQ x 20  Dorsiflexion taps x 20  Heel raises x 20      Standing weight shifts with scale (90 lbs or less week of 12/19/2022 low to no pain c/o)     Not performed this date:  [place] pick ups into Eversion  [place] pick ups into Inversion  BAPS board ROM-       Plan for Next Visit:  Assess patients response to the first visit following IE, manual therapy intervention, therapeutic exercises, pt education, and HEP.  Assess alignment, joint and soft tissue mobility, flexibility, scar tissue, swelling, recruitment, control, balance, gait.  Manual therapy to address alignment, mobility, flexibility, tenderness, soft tissue restrictions and/or presence of trigger points.  Add stretching, stabilization, joint and soft tissue mobility, balance and strengthening exercises as appropriate.  Modalities, Dry Needling if indicated.           Patient Education and Home Exercises     Home Exercises Provided and Patient Education " Provided     Education provided:   - Educated pt that he/she may feel soreness after session.    -Orthopedic precautions    Written Home Exercises Provided: yes. Exercises were reviewed and Cindy was able to demonstrate them prior to the end of the session.  Cindy demonstrated good  understanding of the education provided. See EMR under Patient Instructions for exercises provided during therapy sessions    ASSESSMENT     Observed pt taking a few steps FWB left LE without crutch-reminded her of WB precautions at this time. Walking with right unilateral axillary crutch into clinic with right lateral trunk flexion and left LE abducted. Tactile cues for prevention of knee and hip compensation with ankle exercises. Utilized scale for feedback for proper WB within orthopedic precautions and pt was at 85# left LE. Cues during gait for aforementioned deviations and was able to correct ~50%.     Cindy Is progressing well towards her goals.   Pt prognosis is Good.     Pt will continue to benefit from skilled outpatient physical therapy to address the deficits listed in the problem list box on initial evaluation, provide pt/family education and to maximize pt's level of independence in the home and community environment.     Pt's spiritual, cultural and educational needs considered and pt agreeable to plan of care and goals.     Anticipated barriers to physical therapy: Schedule conflicts, transportation, pain, guarding, tolerance, endurance.  Joint, soft tissue and scar tissue restrictions following surgery and prolonged immobilization    Goals:   Short-Term: 4 weeks (1/20/2023)   ongoing  Pt will improve Left ankle dorsiflexion PROM to > or = 10* to promote return to prior functional status.  Pt will improve left ankle plantarflexion PROM to > or = 37* to promote return to prior functional status.  Pt will improve left ankle inversion PROM to > or = 22* to promote return to prior functional status.  Pt will improve  left ankle eversion PROM to > or = 10* to promote return to prior functional status.  Pt will be able to progress weight bearing with/without boot as needed with < 3-4/10 pain  Pt will demonstrate improved gait pattern as evidenced by good gait pattern with boot on with/without boot on Left Lower Extremity.  Pt will maintain pain levels < or = to 3-4/10 to help promote appropriate progression of PT, HEP, weight bearing and ADL's    Pt will be compliant with new home exercise program to assist with PT intervention and help allow appropriate progression through plan of care.     Long-Term: 12 weeks (3/17/2023)   ongoing  Pt will improve left ankle AROM to WFL-WNL to allow return to normal activities of daily living.  Pt will improve left ankle strength to > or = 5/5 to allow performance of activities of daily living.  Pt will improve left knee strength to > or = 5/5 to allow performance of activities of daily living.  Pt will improve bilateral hip strength to > or = 5-/5 to allow performance of activities of daily living.  Pt will display good reciprocating gait pattern without AD to improve QOL and allow return to prior functional status.  Pt will report < or = 2/10 pain during activities of daily living to improve QOL and allow return to prior functional status.  Pt will perform single limb balance on stable surface with appropriate balance reactions for > or = 30 seconds to allow safe return to prior level of function.  Pt will be able to negotiate walking stairs and on uneven surfaces with normal shoes, without AD with good balance and gait pattern.   Pt will be compliant and independent with HEP to allow and maintain better participation in normal activities  Pt will be able to resume normals ADL's, IADL's, previous activities    PLAN     Continue per POC, progressing as appropriate to achieve stated goals.    Continue with: Plan of care Certification: 12/19/2022 to 3/17/2023.        Outpatient Physical Therapy 2  times weekly for 12 weeks to include the following interventions: Electrical Stimulation attended/unattended, Gait Training, Manual Therapy, Moist Heat/ Ice, Neuromuscular Re-ed, Orthotic Management and Training, Patient Education, Therapeutic Activities, Therapeutic Exercise, Ultrasound, and dry needling.     Renay Nichols, PTA

## 2022-12-27 DIAGNOSIS — S93.432A ANKLE SYNDESMOSIS DISRUPTION, LEFT, INITIAL ENCOUNTER: Primary | ICD-10-CM

## 2022-12-29 ENCOUNTER — CLINICAL SUPPORT (OUTPATIENT)
Dept: REHABILITATION | Facility: HOSPITAL | Age: 72
End: 2022-12-29
Payer: MEDICARE

## 2022-12-29 DIAGNOSIS — R29.898 WEAKNESS OF LEFT LOWER EXTREMITY: ICD-10-CM

## 2022-12-29 DIAGNOSIS — M25.572 ACUTE LEFT ANKLE PAIN: Primary | ICD-10-CM

## 2022-12-29 DIAGNOSIS — R26.89 IMPAIRMENT OF BALANCE: ICD-10-CM

## 2022-12-29 DIAGNOSIS — M25.672 STIFFNESS OF LEFT ANKLE JOINT: ICD-10-CM

## 2022-12-29 DIAGNOSIS — R26.9 ALTERED GAIT: ICD-10-CM

## 2022-12-29 DIAGNOSIS — M25.675 JOINT STIFFNESS OF LEFT FOOT: ICD-10-CM

## 2022-12-29 PROCEDURE — 97140 MANUAL THERAPY 1/> REGIONS: CPT | Mod: PN,CQ

## 2022-12-29 PROCEDURE — 97116 GAIT TRAINING THERAPY: CPT | Mod: PN,CQ

## 2022-12-29 PROCEDURE — 97110 THERAPEUTIC EXERCISES: CPT | Mod: PN,CQ

## 2022-12-29 NOTE — PROGRESS NOTES
[facility]   Physical Therapy Treatment Note     Name: Cindy Ramirez  Clinic Number: [id number]    Therapy Diagnosis:   Encounter Diagnoses   Name Primary?    Acute left ankle pain Yes    Stiffness of left ankle joint     Joint stiffness of left foot     Altered gait     Impairment of balance     Weakness of left lower extremity        Physician: Allen Mauricio MD    Visit Date: 12/29/2022    Physician Orders: PT Eval and Treat   1-2 times per week for 6 to 8 weeks per ankle fracture protocol.  PT to start week of 12/12/22. 50% WB in boot week 1; 75% WB in boot week 2; 100% WB in boot week 3.  Active/Active assist ankle ROM. May start slow, gradual strengthening once full WB in boot. Modalities as needed.  Stay in boot until next Ortho clinic visit.  Medical Diagnosis from Referral: Ankle syndesmosis disruption, left, initial encounter [S93.432A], Ankle fracture, bimalleolar, closed, left, initial encounter [S82.842A]  Evaluation Date: 12/19/2022  Authorization Period Expiration: 12/31/2022  Plan of Care Expiration: 3/17/2023  Progress Note Due: 1/20/2023  Visit # / Visits authorized: 2 / 20 +eval  FOTO: Issued Visit #: 1/3, (capture on visit 1, 5, 10) first on 12/19/2022     Precautions: Standard and Weightbearing (see Dr goins), VIJI ESCALERA, procedure:  10/19/2022   Left trimalleolar ankle fracture open reduction internal fixation without posterior lip fixation.  Open reduction internal fixation of syndesmotic disruption.     1-2 times per week for 6 to 8 weeks per ankle fracture protocol.  PT to start week of 12/12/22. 50% WB in boot week 1; 75% WB in boot week 2; 100% WB in boot week 3.  Active/Active assist ankle ROM. May start slow, gradual strengthening once full WB in boot. Modalities as needed.  Stay in boot until next Ortho clinic visit.    PTA Visit #: 2/5     Time In: 1415  Time Out: 1518  Total Billable Time: 63 minutes    SUBJECTIVE     Pt reports: home exercise  program mostly twice a day. Walking with right unilateral axillary crutch and wearing left boot. 1-2/10 pin sticking sensation at lateral left foot.     She was compliant with home exercise program.  Response to previous treatment: mild soreness  Functional change: none stated     Pain: 1-2/10  Location: left ankle    OBJECTIVE     Objective Measures updated at progress report unless specified.     Treatment     Cindy received the treatments listed below:        manual therapy techniques: for 15 minutes, including:  Scar tissue mobilization at medial and lateral incision sites, retrograde massage to foot/ankle/lower leg due to swelling.  Active Assist Range of Motion to ankle/foot/toes in all planes as tolerated     therapeutic exercises for 40 minutes including:  (100% WB in boot week 3)  Toe flexion/extension x 20  Toe splay x 20  Toe flexion with doming to toe extension with Eversion FROM as tolerated x 20  Ankle pumps x 20  Upper case alphabet x 2    Lower case alphabet x 2  Ankle circles x 20 each direction  Towel scrunch x 10     Seated  LAQ x 30  Dorsiflexion taps , two cones x 20  Heel raises x 30  [place]ornitos pick ups into Eversion  [place]ornitos pick ups into Inversion  MFT A-P 2 x 10      Standing weight shifts with scale (127 lbs 12/29/2022 with no pain c/o)     Not performed this date:    [facility]      Cindy participated in gait training to improve functional mobility and safety for 8 minutes, including:      Gait training 4 laps each way in parallel bars FWB with light UE support       Plan for Next Visit:  Assess patients response to the first visit following IE, manual therapy intervention, therapeutic exercises, pt education, and HEP.  Assess alignment, joint and soft tissue mobility, flexibility, scar tissue, swelling, recruitment, control, balance, gait.  Manual therapy to address alignment, mobility, flexibility, tenderness, soft tissue restrictions and/or presence of trigger points.  Add  "stretching, stabilization, joint and soft tissue mobility, balance and strengthening exercises as appropriate.  Modalities, Dry Needling if indicated.           Patient Education and Home Exercises     Home Exercises Provided and Patient Education Provided     Education provided:   - Educated pt that he/she may feel soreness after session.    -Orthopedic precautions  Educated pt that he/she may feel soreness after session.      Written Home Exercises Provided: Instructed patient to continue current home exercise program.    Exercises were reviewed and Cindy was able to demonstrate them prior to the end of the session.  Cindy demonstrated good  understanding of the education provided. See EMR under Patient Instructions for exercises provided during therapy sessions    ASSESSMENT     Continued edema left ankle and foot with improvement after manual therapy. Walking with right unilateral axillary crutch into clinic with right lateral trunk flexion and left LE abducted. Tactile cues for prevention of knee and hip compensation with ankle exercises.   "A little sore, but nothing painful" with exercises. Progression of WB to FWB today per protocol with usage of scale for feedback, and parallel bars for safety. Cues for proper step width. Good tolerance for progression of ankle AROM and AAROM exercises.     Cindy Is progressing well towards her goals.   Pt prognosis is Good.     Pt will continue to benefit from skilled outpatient physical therapy to address the deficits listed in the problem list box on initial evaluation, provide pt/family education and to maximize pt's level of independence in the home and community environment.     Pt's spiritual, cultural and educational needs considered and pt agreeable to plan of care and goals.     Anticipated barriers to physical therapy: Schedule conflicts, transportation, pain, guarding, tolerance, endurance.  Joint, soft tissue and scar tissue restrictions following " surgery and prolonged immobilization    Goals:   Short-Term: 4 weeks (1/20/2023)   ongoing  Pt will improve Left ankle dorsiflexion PROM to > or = 10* to promote return to prior functional status.  Pt will improve left ankle plantarflexion PROM to > or = 37* to promote return to prior functional status.  Pt will improve left ankle inversion PROM to > or = 22* to promote return to prior functional status.  Pt will improve left ankle eversion PROM to > or = 10* to promote return to prior functional status.  Pt will be able to progress weight bearing with/without boot as needed with < 3-4/10 pain  Pt will demonstrate improved gait pattern as evidenced by good gait pattern with boot on with/without boot on Left Lower Extremity.  Pt will maintain pain levels < or = to 3-4/10 to help promote appropriate progression of PT, HEP, weight bearing and ADL's    Pt will be compliant with new home exercise program to assist with PT intervention and help allow appropriate progression through plan of care.     Long-Term: 12 weeks (3/17/2023)   ongoing  Pt will improve left ankle AROM to WFL-WNL to allow return to normal activities of daily living.  Pt will improve left ankle strength to > or = 5/5 to allow performance of activities of daily living.  Pt will improve left knee strength to > or = 5/5 to allow performance of activities of daily living.  Pt will improve bilateral hip strength to > or = 5-/5 to allow performance of activities of daily living.  Pt will display good reciprocating gait pattern without AD to improve QOL and allow return to prior functional status.  Pt will report < or = 2/10 pain during activities of daily living to improve QOL and allow return to prior functional status.  Pt will perform single limb balance on stable surface with appropriate balance reactions for > or = 30 seconds to allow safe return to prior level of function.  Pt will be able to negotiate walking stairs and on uneven surfaces with normal  shoes, without AD with good balance and gait pattern.   Pt will be compliant and independent with HEP to allow and maintain better participation in normal activities  Pt will be able to resume normals ADL's, IADL's, previous activities    PLAN     Continue per POC, progressing as appropriate to achieve stated goals.    Continue with: Plan of care Certification: 12/19/2022 to 3/17/2023.        Outpatient Physical Therapy 2 times weekly for 12 weeks to include the following interventions: Electrical Stimulation attended/unattended, Gait Training, Manual Therapy, Moist Heat/ Ice, Neuromuscular Re-ed, Orthotic Management and Training, Patient Education, Therapeutic Activities, Therapeutic Exercise, Ultrasound, and dry needling.     Renay Nichols, PTA

## 2022-12-30 ENCOUNTER — HOSPITAL ENCOUNTER (OUTPATIENT)
Dept: RADIOLOGY | Facility: HOSPITAL | Age: 72
Discharge: HOME | End: 2022-12-30
Attending: ORTHOPAEDIC SURGERY
Payer: MEDICARE

## 2022-12-30 ENCOUNTER — OFFICE VISIT (OUTPATIENT)
Dept: ORTHOPEDICS | Facility: CLINIC | Age: 72
End: 2022-12-30
Payer: MEDICARE

## 2022-12-30 VITALS — HEIGHT: 63 IN | WEIGHT: 120 LBS | BODY MASS INDEX: 21.26 KG/M2

## 2022-12-30 DIAGNOSIS — S93.432A ANKLE SYNDESMOSIS DISRUPTION, LEFT, INITIAL ENCOUNTER: ICD-10-CM

## 2022-12-30 DIAGNOSIS — S82.842A ANKLE FRACTURE, BIMALLEOLAR, CLOSED, LEFT, INITIAL ENCOUNTER: Primary | ICD-10-CM

## 2022-12-30 PROCEDURE — 99024 POSTOP FOLLOW-UP VISIT: CPT | Mod: POP,,, | Performed by: ORTHOPAEDIC SURGERY

## 2022-12-30 PROCEDURE — 73610 X-RAY EXAM OF ANKLE: CPT | Mod: TC,PO,LT

## 2022-12-30 PROCEDURE — 99999 PR PBB SHADOW E&M-EST. PATIENT-LVL III: ICD-10-PCS | Mod: PBBFAC,,, | Performed by: ORTHOPAEDIC SURGERY

## 2022-12-30 PROCEDURE — 99024 PR POST-OP FOLLOW-UP VISIT: ICD-10-PCS | Mod: POP,,, | Performed by: ORTHOPAEDIC SURGERY

## 2022-12-30 PROCEDURE — 73610 XR ANKLE COMPLETE 3 VIEW LEFT: ICD-10-PCS | Mod: 26,LT,, | Performed by: RADIOLOGY

## 2022-12-30 PROCEDURE — 99999 PR PBB SHADOW E&M-EST. PATIENT-LVL III: CPT | Mod: PBBFAC,,, | Performed by: ORTHOPAEDIC SURGERY

## 2022-12-30 PROCEDURE — 73610 X-RAY EXAM OF ANKLE: CPT | Mod: 26,LT,, | Performed by: RADIOLOGY

## 2022-12-30 PROCEDURE — 99213 OFFICE O/P EST LOW 20 MIN: CPT | Mod: PBBFAC,PN | Performed by: ORTHOPAEDIC SURGERY

## 2022-12-30 NOTE — PROGRESS NOTES
Status/Diagnosis: Displaced Left trimalleolar ankle fracture.  Date of Surgery: 10/19/2022  Date of Injury: 10/11/2022  Return visit: 6 weeks  X-rays on Return: WB 3-views Left ankle    Chief Complaint:   No chief complaint on file.    Present History:  Cindy Ramirez is a 72 y.o. female who presents for routine postop evaluation.  Patient continues to do extremely well postoperatively.  She is now full weight-bearing in the tall boot working with physical therapy weekly.  Using a single crutch as an assistive ambulatory device today.  Denies any complaints of pain.  Still with mild residual swelling to be expected.  Denies any drainage, fever, chills.      Past Medical History:   Diagnosis Date    Arthritis     Asthma     Basal cell carcinoma 2014    right cheek    Cataract     Hyperlipidemia     Hypertension     Insomnia        Past Surgical History:   Procedure Laterality Date    BACK SURGERY      CATARACT EXTRACTION W/  INTRAOCULAR LENS IMPLANT Right 08/22/2019    +12.5    COLONOSCOPY N/A 1/8/2020    Procedure: COLONOSCOPY;  Surgeon: Jameson Tovar MD;  Location: [facility] ENDO;  Service: Endoscopy;  Laterality: N/A;    EYE SURGERY      cataract ou    FIXATION OF SYNDESMOSIS OF ANKLE Left 10/19/2022    Procedure: FIXATION, SYNDESMOSIS, ANKLE;  Surgeon: Allen Mauricio MD;  Location: [facility] OR;  Service: Orthopedics;  Laterality: Left;    NECK SURGERY  2000    corpectomy    OPEN REDUCTION AND INTERNAL FIXATION (ORIF) OF INJURY OF ANKLE Left 10/19/2022    Procedure: ORIF, ANKLE;  Surgeon: Allen Mauricio MD;  Location: [facility] OR;  Service: Orthopedics;  Laterality: Left;    SPINAL FUSION      TONSILLECTOMY      tonsils         Current Outpatient Medications   Medication Sig    albuterol (PROAIR HFA) 90 mcg/actuation inhaler Inhale 2 puffs into the lungs every 4 (four) hours as needed. Rescue    albuterol (PROVENTIL/VENTOLIN HFA) 90 mcg/actuation inhaler Inhale 2 puffs into the lungs every 6 (six) hours as  needed for Wheezing or Shortness of Breath. Rescue    aspirin (ECOTRIN) 81 MG EC tablet Take 81 mg by mouth once daily.    azelastine (ASTELIN) 137 mcg (0.1 %) nasal spray 1 spray (137 mcg total) by Nasal route 2 (two) times daily.    calcium-vitamin D3 (OS-SHABANA 500 + D3) 500 mg-5 mcg (200 unit) per tablet Take 1 tablet by mouth 2 (two) times daily with meals.    EScitalopram oxalate (LEXAPRO) 5 MG Tab Take 1 tablet (5 mg total) by mouth once daily.    fluticasone propionate (FLOVENT HFA) 110 mcg/actuation inhaler Inhale 1 puff into the lungs 2 (two) times daily. Controller    lisinopriL (PRINIVIL,ZESTRIL) 30 MG tablet Take 1 tablet (30 mg total) by mouth once daily.    multivitamin capsule Take 1 capsule by mouth once daily.    promethazine-dextromethorphan (PROMETHAZINE-DM) 6.25-15 mg/5 mL Syrp Take 5 mLs by mouth every 4 (four) hours as needed (cough).     No current facility-administered medications for this visit.       Review of patient's allergies indicates:   Allergen Reactions    Penicillins      Other reaction(s): Unknown       Family History   Problem Relation Age of Onset    Hypertension Mother     Macular degeneration Mother     Hypertension Father     Cancer Father         prostate    Pancreatic cancer Maternal Grandmother     Heart failure Maternal Grandfather     Dementia Paternal Grandmother     Breast cancer Maternal Aunt        Social History     Socioeconomic History    Marital status:    Occupational History     Employer: [employer]     Comment: retired (08/30/2015)   Tobacco Use    Smoking status: Never     Passive exposure: Never    Smokeless tobacco: Never   Substance and Sexual Activity    Alcohol use: Yes     Alcohol/week: 7.0 standard drinks     Types: 7 Glasses of wine per week    Drug use: No    Sexual activity: Yes     Partners: Male     Birth control/protection: None     Social Determinants of Health     Financial Resource Strain: Low Risk     Difficulty of  Paying Living Expenses: Not hard at all   Food Insecurity: No Food Insecurity    Worried About Running Out of Food in the Last Year: Never true    Ran Out of Food in the Last Year: Never true   Transportation Needs: No Transportation Needs    Lack of Transportation (Medical): No    Lack of Transportation (Non-Medical): No   Physical Activity: Inactive    Days of Exercise per Week: 0 days    Minutes of Exercise per Session: 0 min   Stress: Stress Concern Present    Feeling of Stress : Very much   Social Connections: Moderately Integrated    Frequency of Communication with Friends and Family: More than three times a week    Frequency of Social Gatherings with Friends and Family: More than three times a week    Attends [employer] Services: More than 4 times per year    Active Member of Clubs or Organizations: No    Attends Club or Organization Meetings: Never    Marital Status:    Housing Stability: Low Risk     Unable to Pay for Housing in the Last Year: No    Number of Places Lived in the Last Year: 1    Unstable Housing in the Last Year: No       Physical exam:  There were no vitals filed for this visit.  There is no height or weight on file to calculate BMI.  General: In no apparent distress; well developed and well nourished.  HEENT: normocephalic; atraumatic.  Cardiovascular: regular rate.  Respiratory: no increased work of breathing.  Musculoskeletal:   Inspection:  Surgical scars are well healed.  Minimal residual swelling continued improvement since last being seen.  No signs or symptoms of infection.  No drainage, warmth, erythema.  Ankle range of motion from 5° dorsiflexion to 45° plantar flexion. Gross motor and sensory function intact.  Palpable pedal pulse.                Weightbearing three views left ankle:  Status post bimalleolar ankle fracture ORIF with trans syndesmotic screw fixation.  Overall alignment well-maintained.  Fractures appear well-healed. Congruent ankle mortise.  No evidence of  implant loosening or failure.     Assessment:  Cindy Ramirez is a 72 y.o. female with acute displaced left trimalleolar ankle fracture.  Date of injury:  10/11/2022.  STATUS POST Trimalleolar ankle fracture and syndesmosis ORIF on 10/19/2022.     Plan:   Clinical and radiographic findings were discussed.  Patient continues to do extremely well postoperatively.  May continue to progress with physical therapy per protocol.  After 1 week if full weight-bearing in a tall boot may then transition to normal shoe wear.  We did discuss wearing supportive tennis shoes versus lace-up boots.  Patient refrain from any high impact activities-running, jumping, etc..  Return to clinic in 6 weeks for repeat evaluation.  At that time we will discuss return to OR for removal of trans syndesmotic screw.  Patient voiced understanding.  All questions were answered.      This note was created using voice recognition software and may contain grammatical errors.

## 2023-01-03 ENCOUNTER — HOSPITAL ENCOUNTER (OUTPATIENT)
Dept: RADIOLOGY | Facility: HOSPITAL | Age: 73
Discharge: HOME | End: 2023-01-03
Attending: PHYSICIAN ASSISTANT
Payer: MEDICARE

## 2023-01-03 DIAGNOSIS — J45.30 MILD PERSISTENT ASTHMA WITHOUT COMPLICATION: ICD-10-CM

## 2023-01-03 PROCEDURE — 71046 X-RAY EXAM CHEST 2 VIEWS: CPT | Mod: 26,,, | Performed by: RADIOLOGY

## 2023-01-03 PROCEDURE — 71046 X-RAY EXAM CHEST 2 VIEWS: CPT | Mod: TC,FY,PO

## 2023-01-03 PROCEDURE — 71046 XR CHEST PA AND LATERAL: ICD-10-PCS | Mod: 26,,, | Performed by: RADIOLOGY

## 2023-01-04 ENCOUNTER — CLINICAL SUPPORT (OUTPATIENT)
Dept: REHABILITATION | Facility: HOSPITAL | Age: 73
End: 2023-01-04
Payer: MEDICARE

## 2023-01-04 DIAGNOSIS — R29.898 WEAKNESS OF LEFT LOWER EXTREMITY: ICD-10-CM

## 2023-01-04 DIAGNOSIS — M25.672 STIFFNESS OF LEFT ANKLE JOINT: ICD-10-CM

## 2023-01-04 DIAGNOSIS — R26.89 IMPAIRMENT OF BALANCE: ICD-10-CM

## 2023-01-04 DIAGNOSIS — R26.9 ALTERED GAIT: ICD-10-CM

## 2023-01-04 DIAGNOSIS — M25.675 JOINT STIFFNESS OF LEFT FOOT: ICD-10-CM

## 2023-01-04 DIAGNOSIS — M25.572 ACUTE LEFT ANKLE PAIN: Primary | ICD-10-CM

## 2023-01-04 PROCEDURE — 97110 THERAPEUTIC EXERCISES: CPT | Mod: PN

## 2023-01-04 PROCEDURE — 97140 MANUAL THERAPY 1/> REGIONS: CPT | Mod: PN

## 2023-01-04 NOTE — PROGRESS NOTES
[facility]   Physical Therapy Treatment Note     Name: Cindy Ramirez  Clinic Number: [id number]    Therapy Diagnosis:   Encounter Diagnoses   Name Primary?    Acute left ankle pain Yes    Stiffness of left ankle joint     Joint stiffness of left foot     Altered gait     Impairment of balance     Weakness of left lower extremity        Physician: Allen Mauricio MD    Visit Date: 1/4/2023    Physician Orders: PT Eval and Treat   1-2 times per week for 6 to 8 weeks per ankle fracture protocol.  PT to start week of 12/12/22. 50% WB in boot week 1; 75% WB in boot week 2; 100% WB in boot week 3.  Active/Active assist ankle ROM. May start slow, gradual strengthening once full WB in boot. Modalities as needed.  May continue to progress with physical therapy per protocol.  After 1 week if full weight-bearing in a tall boot may then transition to normal shoe wear.  We did discuss wearing supportive tennis shoes versus lace-up boots.    Medical Diagnosis from Referral: Ankle syndesmosis disruption, left, initial encounter [S93.432A], Ankle fracture, bimalleolar, closed, left, initial encounter [S82.842A]  Evaluation Date: 12/19/2022  Authorization Period Expiration: 12/31/2022  Plan of Care Expiration: 3/17/2023  Progress Note Due: 1/20/2023  Visit # / Visits authorized: 1 / 20 +2 +eval  FOTO: Issued Visit #: 1/3, (capture on visit 1, 5, 10) first on 12/19/2022     Precautions: Standard and Weightbearing (see Dr goins), VIJI ESCALERA, procedure:  10/19/2022   Left trimalleolar ankle fracture open reduction internal fixation without posterior lip fixation.  Open reduction internal fixation of syndesmotic disruption.     1-2 times per week for 6 to 8 weeks per ankle fracture protocol.  PT to start week of 12/12/22. 50% WB in boot week 1; 75% WB in boot week 2; 100% WB in boot week 3.  Active/Active assist ankle ROM. May start slow, gradual strengthening once full WB in boot. Modalities as  needed.  May continue to progress with physical therapy per protocol.  After 1 week if full weight-bearing in a tall boot may then transition to normal shoe wear.  We did discuss wearing supportive tennis shoes versus lace-up boots.    PTA Visit #: 0/5     Time In: 7:45  Time Out: 8:40  Total Billable Time: 55 minutes    SUBJECTIVE     Pt reports: home exercise program mostly twice a day. She went to her Dr 12/30/2022 and had a good follow up.  He states she is doing well and can transition out of tall boot to normal supportive shoes after FWB x 1 week.  She will start wearing her shoes this weekend but likes the idea of bringing her Left shoe to the clinic Friday to work on FWB in her tennis shoe in the clinic, gait, balance, assess discomfort or any other potential c/o.    She was compliant with home exercise program.  Response to previous treatment: doing well, less soreness  Functional change: tolerating FWB in tall boot without AD without pain     Pain: 0-1/10  Location: left ankle    OBJECTIVE     Objective Measures updated at progress report unless specified.     Treatment     Cindy received the treatments listed below:        manual therapy techniques: for 10 minutes, including:  Scar tissue mobilization at medial and lateral incision sites, retrograde massage to foot/ankle/lower leg due to swelling.  Active Assist Range of Motion to ankle/foot/toes in all planes as tolerated     therapeutic exercises for 45 minutes including:  (can start FWB in normal supportive shoes 1/6/2023)  Toe flexion/extension x 20  Toe splay x 20  Toe flexion with doming to toe extension with Eversion FROM as tolerated x 20  Ankle pumps with red theraband x 20  Inv and Eversion with red theraband x 20 each  Upper case alphabet x 2    Lower case alphabet x 2  Ankle circles x 20 each direction  Towel scrunch x 10     Seated  LAQ x 30  Dorsiflexion taps , two cones x 20  Heel raises with elbow leaning into Left thigh 3 x 15  [place]  "pick ups into Eversion  [place] pick ups into Inversion  MFT A-P 2 x 10    Standing:  Single leg stance (in tall boot) 20-30" holds x 5'       Not performed this date:    BAPS board ROM      Cindy participated in gait training to improve functional mobility and safety for 00 minutes, including:    Plan for Next Visit:  Assess patients response to the last visit following manual therapy intervention, therapeutic exercises, pt education, gait training and HEP.  Assess alignment, joint and soft tissue mobility, flexibility, scar tissue, swelling, recruitment, control, balance, gait.  Manual therapy to address alignment, mobility, flexibility, tenderness, soft tissue restrictions and/or presence of trigger points.  Add stretching, stabilization, joint and soft tissue mobility, balance and strengthening exercises as appropriate.  Modalities, Dry Needling if indicated.           Patient Education and Home Exercises     Home Exercises Provided and Patient Education Provided     Education provided:   - Educated pt that he/she may feel soreness after session.    -Orthopedic precautions  Educated pt that he/she may feel soreness after session.      Written Home Exercises Provided: Instructed patient to continue current home exercise program.    Exercises were reviewed and Cindy was able to demonstrate them prior to the end of the session.  Cindy demonstrated good  understanding of the education provided. See EMR under Patient Instructions for exercises provided during therapy sessions    ASSESSMENT     Continued edema left ankle and foot (getting less with each visit) with improvement after manual therapy. Walking with tall boot on Left Lower Extremity FWB Left Lower Extremity without AD.  Overall her gait pattern looked much improved.  Still stiff in her ankle and subtalar joint, weak with exercises using red theraband, and she can increased soreness/tightness in her calf trying to single leg stance in her boot " "today.  Discussed with her to progress at home with "little bit lots" philosophy.    Cindy Is progressing well towards her goals.   Pt prognosis is Good.     Pt will continue to benefit from skilled outpatient physical therapy to address the deficits listed in the problem list box on initial evaluation, provide pt/family education and to maximize pt's level of independence in the home and community environment.     Pt's spiritual, cultural and educational needs considered and pt agreeable to plan of care and goals.     Anticipated barriers to physical therapy: Schedule conflicts, transportation, pain, guarding, tolerance, endurance.  Joint, soft tissue and scar tissue restrictions following surgery and prolonged immobilization    Goals:   Short-Term: 4 weeks (1/20/2023)   ongoing  Pt will improve Left ankle dorsiflexion PROM to > or = 10* to promote return to prior functional status.  Pt will improve left ankle plantarflexion PROM to > or = 37* to promote return to prior functional status.  Pt will improve left ankle inversion PROM to > or = 22* to promote return to prior functional status.  Pt will improve left ankle eversion PROM to > or = 10* to promote return to prior functional status.  Pt will be able to progress weight bearing with/without boot as needed with < 3-4/10 pain  Pt will demonstrate improved gait pattern as evidenced by good gait pattern with boot on with/without boot on Left Lower Extremity.  Pt will maintain pain levels < or = to 3-4/10 to help promote appropriate progression of PT, HEP, weight bearing and ADL's    Pt will be compliant with new home exercise program to assist with PT intervention and help allow appropriate progression through plan of care.     Long-Term: 12 weeks (3/17/2023)   ongoing  Pt will improve left ankle AROM to WFL-WNL to allow return to normal activities of daily living.  Pt will improve left ankle strength to > or = 5/5 to allow performance of activities of daily " living.  Pt will improve left knee strength to > or = 5/5 to allow performance of activities of daily living.  Pt will improve bilateral hip strength to > or = 5-/5 to allow performance of activities of daily living.  Pt will display good reciprocating gait pattern without AD to improve QOL and allow return to prior functional status.  Pt will report < or = 2/10 pain during activities of daily living to improve QOL and allow return to prior functional status.  Pt will perform single limb balance on stable surface with appropriate balance reactions for > or = 30 seconds to allow safe return to prior level of function.  Pt will be able to negotiate walking stairs and on uneven surfaces with normal shoes, without AD with good balance and gait pattern.   Pt will be compliant and independent with HEP to allow and maintain better participation in normal activities  Pt will be able to resume normals ADL's, IADL's, previous activities    PLAN     Continue per POC, progressing as appropriate to achieve stated goals.    Continue with: Plan of care Certification: 12/19/2022 to 3/17/2023.        Outpatient Physical Therapy 2 times weekly for 12 weeks to include the following interventions: Electrical Stimulation attended/unattended, Gait Training, Manual Therapy, Moist Heat/ Ice, Neuromuscular Re-ed, Orthotic Management and Training, Patient Education, Therapeutic Activities, Therapeutic Exercise, Ultrasound, and dry needling.     Lyle Barajas, PT

## 2023-01-04 NOTE — PROGRESS NOTES
[facility]   Physical Therapy Treatment Note     Name: Cindy Ramirez  Clinic Number: [id number]    Therapy Diagnosis:   Encounter Diagnoses   Name Primary?    Acute left ankle pain Yes    Stiffness of left ankle joint     Joint stiffness of left foot     Altered gait     Impairment of balance     Weakness of left lower extremity          Physician: Allen Mauricio MD    Visit Date: 1/6/2023    Physician Orders: PT Eval and Treat   1-2 times per week for 6 to 8 weeks per ankle fracture protocol.  PT to start week of 12/12/22. 50% WB in boot week 1; 75% WB in boot week 2; 100% WB in boot week 3.  Active/Active assist ankle ROM. May start slow, gradual strengthening once full WB in boot. Modalities as needed.  May continue to progress with physical therapy per protocol.  After 1 week if full weight-bearing in a tall boot may then transition to normal shoe wear.  We did discuss wearing supportive tennis shoes versus lace-up boots.    Medical Diagnosis from Referral: Ankle syndesmosis disruption, left, initial encounter [S93.432A], Ankle fracture, bimalleolar, closed, left, initial encounter [S82.842A]  Evaluation Date: 12/19/2022  Authorization Period Expiration: 12/31/2022  Plan of Care Expiration: 3/17/2023  Progress Note Due: 1/20/2023  Visit # / Visits authorized: 2 / 20 +2 +eval  FOTO: Issued Visit #: 1/3, (capture on visit 1, 5, 10) first on 12/19/2022     Precautions: Standard and Weightbearing (see Dr goins), VIJI ESCALERA, procedure:  10/19/2022   Left trimalleolar ankle fracture open reduction internal fixation without posterior lip fixation.  Open reduction internal fixation of syndesmotic disruption.     1-2 times per week for 6 to 8 weeks per ankle fracture protocol.  PT to start week of 12/12/22. 50% WB in boot week 1; 75% WB in boot week 2; 100% WB in boot week 3.  Active/Active assist ankle ROM. May start slow, gradual strengthening once full WB in boot. Modalities as  "needed.  May continue to progress with physical therapy per protocol.  After 1 week if full weight-bearing in a tall boot may then transition to normal shoe wear.  We did discuss wearing supportive tennis shoes versus lace-up boots.    PTA Visit #: 1/5     Time In: 0924  Time Out: 1019   Total Billable Time: 45 minutes    SUBJECTIVE     Pt reports: swelling yesterday and had 3/10 neterior and lateral left ankle, so she did not perofrm resistance exercises at home. Feeling better today without pain, "it just feels uncomfortable." Wearing boot left LE and brought her shoes for training.       She was compliant with home exercise program.  Response to previous treatment: doing well, less soreness  Functional change: tolerating FWB in tall boot without AD without pain     Pain: 0/10  Location: left ankle    OBJECTIVE     Objective Measures updated at progress report unless specified.     Treatment     Cindy received the treatments listed below:        manual therapy techniques: for 10 minutes, including:  Scar tissue mobilization at medial and lateral incision sites, retrograde massage to foot/ankle/lower leg due to swelling.  Active Assist Range of Motion to ankle/foot/toes in all planes as tolerated     FOTO next session if possible    therapeutic exercises for 25 minutes including:  (can start FWB in normal supportive shoes 1/6/2023)  Toe flexion/extension x 20  Toe splay x 20  Toe flexion with doming to toe extension with Eversion FROM as tolerated x 20  Ankle pumps with red theraband x 20  Inv and Eversion with red theraband x 20 each  Upper case alphabet x 2    Lower case alphabet x 2  Ankle circles x 20 each direction  Towel scrunch x 10     Seated-NP   LAQ x 30  Dorsiflexion taps , two cones x 20  Heel raises with elbow leaning into Left thigh 3 x 15  [place] pick ups into Eversion  [place] pick ups into Inversion  MFT A-P 2 x 10    Standing:  Single leg stance (in tall boot) 20-30" holds x 5'  [place] x 10   "   Not performed this date:    [facility]      Cindy participated in gait training to improve functional mobility and safety for 10 minutes, including:    Walking around gym and obstacles with cues for increased stance time left for symmetry and erect posture.     CP applied to the left ankle x 10 minutes in supine with elevator position after being cleared for contraindications.      Plan for Next Visit:  Assess patients response to the last visit following manual therapy intervention, therapeutic exercises, pt education, gait training and HEP.  Assess alignment, joint and soft tissue mobility, flexibility, scar tissue, swelling, recruitment, control, balance, gait.  Manual therapy to address alignment, mobility, flexibility, tenderness, soft tissue restrictions and/or presence of trigger points.  Add stretching, stabilization, joint and soft tissue mobility, balance and strengthening exercises as appropriate.  Modalities, Dry Needling if indicated.           Patient Education and Home Exercises     Home Exercises Provided and Patient Education Provided     Education provided:   - Educated pt that he/she may feel soreness after session.    -Orthopedic precautions  Educated pt that he/she may feel soreness after session.      Written Home Exercises Provided: Instructed patient to continue current home exercise program. Reminded pt that MD instruction is to start weaning from boot at home starting tomorrow.     Exercises were reviewed and Cindy was able to demonstrate them prior to the end of the session.  Cindy demonstrated good  understanding of the education provided. See EMR under Patient Instructions for exercises provided during therapy sessions    ASSESSMENT     Swelling and discomfort after last session which has improved. Eager to progress to walking with shoes and had good tolerance for progression of standing and gait with shoe with mild soreness and aching afterward. Applied cold pack for  inflammation purposes. Gait pattern is improved with shoe and cues.     Cindy Is progressing well towards her goals.   Pt prognosis is Good.     Pt will continue to benefit from skilled outpatient physical therapy to address the deficits listed in the problem list box on initial evaluation, provide pt/family education and to maximize pt's level of independence in the home and community environment.     Pt's spiritual, cultural and educational needs considered and pt agreeable to plan of care and goals.     Anticipated barriers to physical therapy: Schedule conflicts, transportation, pain, guarding, tolerance, endurance.  Joint, soft tissue and scar tissue restrictions following surgery and prolonged immobilization    Goals:   Short-Term: 4 weeks (1/20/2023)   ongoing  Pt will improve Left ankle dorsiflexion PROM to > or = 10* to promote return to prior functional status.  Pt will improve left ankle plantarflexion PROM to > or = 37* to promote return to prior functional status.  Pt will improve left ankle inversion PROM to > or = 22* to promote return to prior functional status.  Pt will improve left ankle eversion PROM to > or = 10* to promote return to prior functional status.  Pt will be able to progress weight bearing with/without boot as needed with < 3-4/10 pain  Pt will demonstrate improved gait pattern as evidenced by good gait pattern with boot on with/without boot on Left Lower Extremity.  Pt will maintain pain levels < or = to 3-4/10 to help promote appropriate progression of PT, HEP, weight bearing and ADL's    Pt will be compliant with new home exercise program to assist with PT intervention and help allow appropriate progression through plan of care.     Long-Term: 12 weeks (3/17/2023)   ongoing  Pt will improve left ankle AROM to WFL-WNL to allow return to normal activities of daily living.  Pt will improve left ankle strength to > or = 5/5 to allow performance of activities of daily living.  Pt  will improve left knee strength to > or = 5/5 to allow performance of activities of daily living.  Pt will improve bilateral hip strength to > or = 5-/5 to allow performance of activities of daily living.  Pt will display good reciprocating gait pattern without AD to improve QOL and allow return to prior functional status.  Pt will report < or = 2/10 pain during activities of daily living to improve QOL and allow return to prior functional status.  Pt will perform single limb balance on stable surface with appropriate balance reactions for > or = 30 seconds to allow safe return to prior level of function.  Pt will be able to negotiate walking stairs and on uneven surfaces with normal shoes, without AD with good balance and gait pattern.   Pt will be compliant and independent with HEP to allow and maintain better participation in normal activities  Pt will be able to resume normals ADL's, IADL's, previous activities    PLAN     Continue per POC, progressing as appropriate to achieve stated goals.    Continue with: Plan of care Certification: 12/19/2022 to 3/17/2023.        Outpatient Physical Therapy 2 times weekly for 12 weeks to include the following interventions: Electrical Stimulation attended/unattended, Gait Training, Manual Therapy, Moist Heat/ Ice, Neuromuscular Re-ed, Orthotic Management and Training, Patient Education, Therapeutic Activities, Therapeutic Exercise, Ultrasound, and dry needling.     Renay Nichols, PTA

## 2023-01-06 ENCOUNTER — CLINICAL SUPPORT (OUTPATIENT)
Dept: REHABILITATION | Facility: HOSPITAL | Age: 73
End: 2023-01-06
Payer: MEDICARE

## 2023-01-06 DIAGNOSIS — M25.675 JOINT STIFFNESS OF LEFT FOOT: ICD-10-CM

## 2023-01-06 DIAGNOSIS — M25.572 ACUTE LEFT ANKLE PAIN: Primary | ICD-10-CM

## 2023-01-06 DIAGNOSIS — M25.672 STIFFNESS OF LEFT ANKLE JOINT: ICD-10-CM

## 2023-01-06 DIAGNOSIS — R26.9 ALTERED GAIT: ICD-10-CM

## 2023-01-06 DIAGNOSIS — R26.89 IMPAIRMENT OF BALANCE: ICD-10-CM

## 2023-01-06 DIAGNOSIS — R29.898 WEAKNESS OF LEFT LOWER EXTREMITY: ICD-10-CM

## 2023-01-06 PROCEDURE — 97140 MANUAL THERAPY 1/> REGIONS: CPT | Mod: PN,CQ

## 2023-01-06 PROCEDURE — 97110 THERAPEUTIC EXERCISES: CPT | Mod: PN,CQ

## 2023-01-06 PROCEDURE — 97116 GAIT TRAINING THERAPY: CPT | Mod: PN,CQ

## 2023-01-09 NOTE — PROGRESS NOTES
[facility]   Physical Therapy Treatment Note     Name: Cindy Ramirez  Clinic Number: [id number]    Therapy Diagnosis:   Encounter Diagnoses   Name Primary?    Acute left ankle pain Yes    Stiffness of left ankle joint     Joint stiffness of left foot     Altered gait     Impairment of balance     Weakness of left lower extremity        Physician: Allen Mauricio MD    Visit Date: 1/10/2023    Physician Orders: PT Eval and Treat   1-2 times per week for 6 to 8 weeks per ankle fracture protocol.  PT to start week of 12/12/22. 50% WB in boot week 1; 75% WB in boot week 2; 100% WB in boot week 3.  Active/Active assist ankle ROM. May start slow, gradual strengthening once full WB in boot. Modalities as needed.  May continue to progress with physical therapy per protocol.  After 1 week if full weight-bearing in a tall boot may then transition to normal shoe wear.  We did discuss wearing supportive tennis shoes versus lace-up boots.    Medical Diagnosis from Referral: Ankle syndesmosis disruption, left, initial encounter [S93.432A], Ankle fracture, bimalleolar, closed, left, initial encounter [S82.842A]  Evaluation Date: 12/19/2022  Authorization Period Expiration: 12/31/2022  Plan of Care Expiration: 3/17/2023  Progress Note Due: 1/20/2023  Visit # / Visits authorized: 3 / 20 +2 +eval  FOTO: Issued Visit #: 1/3, (capture on visit 1, 5, 10) first on 12/19/2022     Precautions: Standard and Weightbearing (see Dr goins), VIJI ESCALERA, procedure:  10/19/2022   Left trimalleolar ankle fracture open reduction internal fixation without posterior lip fixation.  Open reduction internal fixation of syndesmotic disruption.     1-2 times per week for 6 to 8 weeks per ankle fracture protocol.  PT to start week of 12/12/22. 50% WB in boot week 1; 75% WB in boot week 2; 100% WB in boot week 3.  Active/[id number] assist ankle ROM. May start slow, gradual strengthening once full WB in boot. Modalities as  needed.  May continue to progress with physical therapy per protocol.  After 1 week if full weight-bearing in a tall boot may then transition to normal shoe wear.  We did discuss wearing supportive tennis shoes versus lace-up boots.    PTA Visit #: 2/5     Time In: 0902  Time Out: 1015  Total Billable Time: 58 minutes    SUBJECTIVE     Pt reports: wore tennis shoes ~50% over the weekend and did well without pain. Carried her phone so she could count her steps, walked lengths of commercials and got over 1000 steps yesterday. Stiffness with prolonged position, so she felt like walking has helped. Sometimes has soreness at plantar ball of foot with walking in tennis shoe.       She was compliant with home exercise program.  Response to previous treatment: doing well, less soreness  Functional change: transition to tennis shoe without ankle pain.     Pain: 0/10  Location: left ankle    OBJECTIVE     Objective Measures updated at progress report unless specified.     Treatment     Cindy received the treatments listed below:        manual therapy techniques: for 15 minutes, including:  Scar tissue mobilization at medial and lateral incision sites, retrograde massage to foot/ankle/lower leg due to swelling.  Active Assist Range of Motion to ankle/foot/toes in all planes as tolerated     FOTO next session if possible    therapeutic exercises for 33 minutes including:  (can start FWB in normal supportive shoes 1/6/2023)  Toe flexion/extension x 20  Toe splay x 20  Toe flexion with doming to toe extension with Eversion FROM as tolerated x 20  Ankle 4 way red band x 20  Upper case alphabet x 2-NP  Lower case alphabet x 2-NP  Ankle circles x 20 each direction  Towel scrunch x 10     Seated   LAQ x 30-NP  Dorsiflexion taps , two cones x 20-NP  Heel raises with elbow leaning into Left thigh 3 x 15  [place] pick ups into Eversion  [place] pick ups into Inversion  Fitter board A-P 20  Fitter board A-P x 20     Standing:  Single  "leg stance 3 x 30s  Minisquat x 10  Sit<>stand from 18" box x 10     Not performed this date:    BAPS board ROM      Cindy participated in gait training to improve functional mobility and safety for 00 minutes, including:    Walking around gym and obstacles with cues for increased stance time left for symmetry and erect posture.     CP applied to the left ankle x 10 minutes in supine with elevator after being cleared for contraindications.      Plan for Next Visit:  Assess patients response to the last visit following manual therapy intervention, therapeutic exercises, pt education, gait training and HEP.  Assess alignment, joint and soft tissue mobility, flexibility, scar tissue, swelling, recruitment, control, balance, gait.  Manual therapy to address alignment, mobility, flexibility, tenderness, soft tissue restrictions and/or presence of trigger points.  Add stretching, stabilization, joint and soft tissue mobility, balance and strengthening exercises as appropriate.  Modalities, Dry Needling if indicated.           Patient Education and Home Exercises     Home Exercises Provided and Patient Education Provided     Education provided:   - Educated pt that he/she may feel soreness after session.    -Orthopedic precautions  Educated pt that he/she may feel soreness after session.      Written Home Exercises Provided: Yes, added to current home exercise program.     Exercises were reviewed and Cindy was able to demonstrate them prior to the end of the session.  Cindy demonstrated good  understanding of the education provided. See EMR under Patient Instructions for exercises provided during therapy sessions    ASSESSMENT     Good tolerance for weaning into tennis shoe over the weekend. Brought her boot in case of need. To continue to transition to shoe. No ankle pain but sometimes feels soreness under ball of foot with walking in tennis shoes. Progressed with strengthening, balance, and functional training " without pain provocation. Cold pack after session to reduce inflammation. Limping gait, stating it is habit, and good correction with cues. Swelling has improved.     Cindy Is progressing well towards her goals.   Pt prognosis is Good.     Pt will continue to benefit from skilled outpatient physical therapy to address the deficits listed in the problem list box on initial evaluation, provide pt/family education and to maximize pt's level of independence in the home and community environment.     Pt's spiritual, cultural and educational needs considered and pt agreeable to plan of care and goals.     Anticipated barriers to physical therapy: Schedule conflicts, transportation, pain, guarding, tolerance, endurance.  Joint, soft tissue and scar tissue restrictions following surgery and prolonged immobilization    Goals:   Short-Term: 4 weeks (1/20/2023)   ongoing  Pt will improve Left ankle dorsiflexion PROM to > or = 10* to promote return to prior functional status.  Pt will improve left ankle plantarflexion PROM to > or = 37* to promote return to prior functional status.  Pt will improve left ankle inversion PROM to > or = 22* to promote return to prior functional status.  Pt will improve left ankle eversion PROM to > or = 10* to promote return to prior functional status.  Pt will be able to progress weight bearing with/without boot as needed with < 3-4/10 pain  Pt will demonstrate improved gait pattern as evidenced by good gait pattern with boot on with/without boot on Left Lower Extremity.  Pt will maintain pain levels < or = to 3-4/10 to help promote appropriate progression of PT, HEP, weight bearing and ADL's    Pt will be compliant with new home exercise program to assist with PT intervention and help allow appropriate progression through plan of care.     Long-Term: 12 weeks (3/17/2023)   ongoing  Pt will improve left ankle AROM to WFL-WNL to allow return to normal activities of daily living.  Pt will  improve left ankle strength to > or = 5/5 to allow performance of activities of daily living.  Pt will improve left knee strength to > or = 5/5 to allow performance of activities of daily living.  Pt will improve bilateral hip strength to > or = 5-/5 to allow performance of activities of daily living.  Pt will display good reciprocating gait pattern without AD to improve QOL and allow return to prior functional status.  Pt will report < or = 2/10 pain during activities of daily living to improve QOL and allow return to prior functional status.  Pt will perform single limb balance on stable surface with appropriate balance reactions for > or = 30 seconds to allow safe return to prior level of function.  Pt will be able to negotiate walking stairs and on uneven surfaces with normal shoes, without AD with good balance and gait pattern.   Pt will be compliant and independent with HEP to allow and maintain better participation in normal activities  Pt will be able to resume normals ADL's, IADL's, previous activities    PLAN     Continue per POC, progressing as appropriate to achieve stated goals.    Continue with: Plan of care Certification: 12/19/2022 to 3/17/2023.        Outpatient Physical Therapy 2 times weekly for 12 weeks to include the following interventions: Electrical Stimulation attended/unattended, Gait Training, Manual Therapy, Moist Heat/ Ice, Neuromuscular Re-ed, Orthotic Management and Training, Patient Education, Therapeutic Activities, Therapeutic Exercise, Ultrasound, and dry needling.     Renay Nichols, PTA

## 2023-01-10 ENCOUNTER — CLINICAL SUPPORT (OUTPATIENT)
Dept: REHABILITATION | Facility: HOSPITAL | Age: 73
End: 2023-01-10
Payer: MEDICARE

## 2023-01-10 DIAGNOSIS — M25.675 JOINT STIFFNESS OF LEFT FOOT: ICD-10-CM

## 2023-01-10 DIAGNOSIS — R29.898 WEAKNESS OF LEFT LOWER EXTREMITY: ICD-10-CM

## 2023-01-10 DIAGNOSIS — R26.9 ALTERED GAIT: ICD-10-CM

## 2023-01-10 DIAGNOSIS — R26.89 IMPAIRMENT OF BALANCE: ICD-10-CM

## 2023-01-10 DIAGNOSIS — M25.572 ACUTE LEFT ANKLE PAIN: Primary | ICD-10-CM

## 2023-01-10 DIAGNOSIS — M25.672 STIFFNESS OF LEFT ANKLE JOINT: ICD-10-CM

## 2023-01-10 PROCEDURE — 97140 MANUAL THERAPY 1/> REGIONS: CPT | Mod: PN,CQ

## 2023-01-10 PROCEDURE — 97110 THERAPEUTIC EXERCISES: CPT | Mod: PN,CQ

## 2023-01-12 ENCOUNTER — CLINICAL SUPPORT (OUTPATIENT)
Dept: REHABILITATION | Facility: HOSPITAL | Age: 73
End: 2023-01-12
Payer: MEDICARE

## 2023-01-12 DIAGNOSIS — M25.675 JOINT STIFFNESS OF LEFT FOOT: ICD-10-CM

## 2023-01-12 DIAGNOSIS — R26.89 IMPAIRMENT OF BALANCE: ICD-10-CM

## 2023-01-12 DIAGNOSIS — M25.572 ACUTE LEFT ANKLE PAIN: Primary | ICD-10-CM

## 2023-01-12 DIAGNOSIS — R29.898 WEAKNESS OF LEFT LOWER EXTREMITY: ICD-10-CM

## 2023-01-12 DIAGNOSIS — R26.9 ALTERED GAIT: ICD-10-CM

## 2023-01-12 DIAGNOSIS — M25.672 STIFFNESS OF LEFT ANKLE JOINT: ICD-10-CM

## 2023-01-12 PROCEDURE — 97140 MANUAL THERAPY 1/> REGIONS: CPT | Mod: PN,CQ

## 2023-01-12 PROCEDURE — 97110 THERAPEUTIC EXERCISES: CPT | Mod: PN,CQ

## 2023-01-12 NOTE — PROGRESS NOTES
[facility]   Physical Therapy Treatment Note     Name: Cindy Ramirez  Clinic Number: [id number]    Therapy Diagnosis:   Encounter Diagnoses   Name Primary?    Acute left ankle pain Yes    Stiffness of left ankle joint     Joint stiffness of left foot     Altered gait     Impairment of balance     Weakness of left lower extremity          Physician: Allen Mauricio MD    Visit Date: 1/12/2023    Physician Orders: PT Eval and Treat   1-2 times per week for 6 to 8 weeks per ankle fracture protocol.  PT to start week of 12/12/22. 50% WB in boot week 1; 75% WB in boot week 2; 100% WB in boot week 3.  Active/Active assist ankle ROM. May start slow, gradual strengthening once full WB in boot. Modalities as needed.  May continue to progress with physical therapy per protocol.  After 1 week if full weight-bearing in a tall boot may then transition to normal shoe wear.  We did discuss wearing supportive tennis shoes versus lace-up boots.    Medical Diagnosis from Referral: Ankle syndesmosis disruption, left, initial encounter [S93.432A], Ankle fracture, bimalleolar, closed, left, initial encounter [S82.842A]  Evaluation Date: 12/19/2022  Authorization Period Expiration: 12/31/2022  Plan of Care Expiration: 3/17/2023  Progress Note Due: 1/20/2023  Visit # / Visits authorized: 4 / 20 +2 +eval  FOTO: Issued Visit #: 1/3, (capture on visit 1, 5, 10) first on 12/19/2022, 1/12/2023     Precautions: Standard and Weightbearing (see  orders), VIJI ESCALERA, procedure:  10/19/2022   Left trimalleolar ankle fracture open reduction internal fixation without posterior lip fixation.  Open reduction internal fixation of syndesmotic disruption.     1-2 times per week for 6 to 8 weeks per ankle fracture protocol.  PT to start week of 12/12/22. 50% WB in boot week 1; 75% WB in boot week 2; 100% WB in boot week 3.  Active/[id number] assist ankle ROM. May start slow, gradual strengthening once full WB in boot.  "Modalities as needed.  May continue to progress with physical therapy per protocol.  After 1 week if full weight-bearing in a tall boot may then transition to normal shoe wear.  We did discuss wearing supportive tennis shoes versus lace-up boots.    PTA Visit #: 3/5     Time In: 0834  Time Out: 0928  Total Billable Time: 44 minutes    SUBJECTIVE     Pt reports: no pain with sitting and pain with walking 3/10. Edema yesterday medial and lateral left ankle 4/10. Rested and used 10-15 minutes (cold rag in baggie of ice). Wore her shoe most of the day Tuesday, with cooking and chores and did a lot of walking to visit her aunt in assisted living complex, "that might have done it."     She was compliant with home exercise program.  Response to previous treatment: doing well, less soreness  Functional change: transition to tennis shoe without ankle pain.     Pain: 3/10  Location: left ankle    OBJECTIVE     Objective Measures updated at progress report unless specified.     Treatment     Cindy received the treatments listed below:        manual therapy techniques: for 15 minutes, including:  Scar tissue mobilization at medial and lateral incision sites, retrograde massage to foot/ankle/lower leg due to swelling.  Active Assist Range of Motion to ankle/foot/toes in all planes as tolerated.      FOTO next session if possible    therapeutic exercises for 29 minutes including:  (can start FWB in normal supportive shoes 1/6/2023)  Toe flexion/extension x 20  Toe splay x 20  Toe flexion with doming to toe extension with Eversion FROM as tolerated x 20-NP  Ankle A-P x 30  Ankle inversion/eversion x 20  Upper case alphabet x 2-NP  Lower case alphabet x 2-NP  Ankle circles x 20 each direction       Seated   LAQ x 30-NP  Dorsiflexion taps , two cones x 20-NP  Heel raises with elbow leaning into Left thigh 3 x 15  [place] pick ups into Eversion  [place] pick ups into Inversion  Fitter board A-P 20  Fitter board left-right x 20 " "  Towel scrunch x 15    Standing:  Single leg stance 3 x 30s  Minisquat x 15  Sit<>stand from 18" box x 10-NP     Not performed this date:    BAPS board ROM      Cindy participated in gait training to improve functional mobility and safety for 00 minutes, including:    Walking around gym and obstacles with cues for increased stance time left for symmetry and erect posture.     CP applied to the left ankle x 10 minutes in supine with elevator after being cleared for contraindications.      Plan for Next Visit:  Assess patients response to the last visit following manual therapy intervention, therapeutic exercises, pt education, gait training and HEP.  Assess alignment, joint and soft tissue mobility, flexibility, scar tissue, swelling, recruitment, control, balance, gait.  Manual therapy to address alignment, mobility, flexibility, tenderness, soft tissue restrictions and/or presence of trigger points.  Add stretching, stabilization, joint and soft tissue mobility, balance and strengthening exercises as appropriate.  Modalities, Dry Needling if indicated.           Patient Education and Home Exercises     Home Exercises Provided and Patient Education Provided     Education provided:   - Educated pt that he/she may feel soreness after session.    -Orthopedic precautions  Educated pt that he/she may feel soreness after session.      Written Home Exercises Provided: . Instructed patient to continue current home exercise program.    Exercises were reviewed and Cidny was able to demonstrate them prior to the end of the session.  Cindy demonstrated good  understanding of the education provided. See EMR under Patient Instructions for exercises provided during therapy sessions    ASSESSMENT     Education on safety with using ice. Reminded pt to use towel layer between ice and skin for protection and to stop after 8-10 minutes. Increased pain and edema since last session and states she wore her tennis shoe for most " of the day on Tuesday, which may have contributed. Pain is at medial and lateral ankle and plantar foot at met heads. Mild edema medial and lateral ankle which improved with retrograde massage and ankle mobility with propped LE.  Modified session today to allow recovery from pain and edema and reviewed importance of weaning to tennis shoe and rest/elevation breaks. Pt wore brace to leave clinic as she was going to visit her aunt again.     Cindy Is progressing well towards her goals.   Pt prognosis is Good.     Pt will continue to benefit from skilled outpatient physical therapy to address the deficits listed in the problem list box on initial evaluation, provide pt/family education and to maximize pt's level of independence in the home and community environment.     Pt's spiritual, cultural and educational needs considered and pt agreeable to plan of care and goals.     Anticipated barriers to physical therapy: Schedule conflicts, transportation, pain, guarding, tolerance, endurance.  Joint, soft tissue and scar tissue restrictions following surgery and prolonged immobilization    Goals:   Short-Term: 4 weeks (1/20/2023)   ongoing  Pt will improve Left ankle dorsiflexion PROM to > or = 10* to promote return to prior functional status.  Pt will improve left ankle plantarflexion PROM to > or = 37* to promote return to prior functional status.  Pt will improve left ankle inversion PROM to > or = 22* to promote return to prior functional status.  Pt will improve left ankle eversion PROM to > or = 10* to promote return to prior functional status.  Pt will be able to progress weight bearing with/without boot as needed with < 3-4/10 pain  Pt will demonstrate improved gait pattern as evidenced by good gait pattern with boot on with/without boot on Left Lower Extremity.  Pt will maintain pain levels < or = to 3-4/10 to help promote appropriate progression of PT, HEP, weight bearing and ADL's    Pt will be compliant with  new home exercise program to assist with PT intervention and help allow appropriate progression through plan of care.     Long-Term: 12 weeks (3/17/2023)   ongoing  Pt will improve left ankle AROM to WFL-WNL to allow return to normal activities of daily living.  Pt will improve left ankle strength to > or = 5/5 to allow performance of activities of daily living.  Pt will improve left knee strength to > or = 5/5 to allow performance of activities of daily living.  Pt will improve bilateral hip strength to > or = 5-/5 to allow performance of activities of daily living.  Pt will display good reciprocating gait pattern without AD to improve QOL and allow return to prior functional status.  Pt will report < or = 2/10 pain during activities of daily living to improve QOL and allow return to prior functional status.  Pt will perform single limb balance on stable surface with appropriate balance reactions for > or = 30 seconds to allow safe return to prior level of function.  Pt will be able to negotiate walking stairs and on uneven surfaces with normal shoes, without AD with good balance and gait pattern.   Pt will be compliant and independent with HEP to allow and maintain better participation in normal activities  Pt will be able to resume normals ADL's, IADL's, previous activities    PLAN     Continue per POC, progressing as appropriate to achieve stated goals.    Continue with: Plan of care Certification: 12/19/2022 to 3/17/2023.        Outpatient Physical Therapy 2 times weekly for 12 weeks to include the following interventions: Electrical Stimulation attended/unattended, Gait Training, Manual Therapy, Moist Heat/ Ice, Neuromuscular Re-ed, Orthotic Management and Training, Patient Education, Therapeutic Activities, Therapeutic Exercise, Ultrasound, and dry needling.     Renay Nichols, PTA

## 2023-01-17 ENCOUNTER — CLINICAL SUPPORT (OUTPATIENT)
Dept: REHABILITATION | Facility: HOSPITAL | Age: 73
End: 2023-01-17
Payer: MEDICARE

## 2023-01-17 DIAGNOSIS — R29.898 WEAKNESS OF LEFT LOWER EXTREMITY: ICD-10-CM

## 2023-01-17 DIAGNOSIS — M25.572 ACUTE LEFT ANKLE PAIN: Primary | ICD-10-CM

## 2023-01-17 DIAGNOSIS — M25.672 STIFFNESS OF LEFT ANKLE JOINT: ICD-10-CM

## 2023-01-17 DIAGNOSIS — R26.89 IMPAIRMENT OF BALANCE: ICD-10-CM

## 2023-01-17 DIAGNOSIS — R26.9 ALTERED GAIT: ICD-10-CM

## 2023-01-17 DIAGNOSIS — M25.675 JOINT STIFFNESS OF LEFT FOOT: ICD-10-CM

## 2023-01-17 PROCEDURE — 97110 THERAPEUTIC EXERCISES: CPT | Mod: PN,CQ

## 2023-01-17 PROCEDURE — 97140 MANUAL THERAPY 1/> REGIONS: CPT | Mod: PN,CQ

## 2023-01-17 NOTE — PROGRESS NOTES
[facility]   Physical Therapy Treatment Note     Name: Cindy Ramirez  Clinic Number: [id number]    Therapy Diagnosis:   Encounter Diagnoses   Name Primary?    Acute left ankle pain Yes    Stiffness of left ankle joint     Joint stiffness of left foot     Altered gait     Impairment of balance     Weakness of left lower extremity          Physician: Allen Mauricio MD    Visit Date: 1/17/2023    Physician Orders: PT Eval and Treat   1-2 times per week for 6 to 8 weeks per ankle fracture protocol.  PT to start week of 12/12/22. 50% WB in boot week 1; 75% WB in boot week 2; 100% WB in boot week 3.  Active/Active assist ankle ROM. May start slow, gradual strengthening once full WB in boot. Modalities as needed.  May continue to progress with physical therapy per protocol.  After 1 week if full weight-bearing in a tall boot may then transition to normal shoe wear.  We did discuss wearing supportive tennis shoes versus lace-up boots.    Medical Diagnosis from Referral: Ankle syndesmosis disruption, left, initial encounter [S93.432A], Ankle fracture, bimalleolar, closed, left, initial encounter [S82.842A]  Evaluation Date: 12/19/2022  Authorization Period Expiration: 12/31/2022  Plan of Care Expiration: 3/17/2023  Progress Note Due: 1/20/2023  Visit # / Visits authorized: 5 / 20 +2 +eval  FOTO: Issued Visit #: 1/3, (capture on visit 1, 5, 10) first on 12/19/2022, 1/12/2023     Precautions: Standard and Weightbearing (see  orders), VIJI ESCALERA, procedure:  10/19/2022   Left trimalleolar ankle fracture open reduction internal fixation without posterior lip fixation.  Open reduction internal fixation of syndesmotic disruption.     1-2 times per week for 6 to 8 weeks per ankle fracture protocol.  PT to start week of 12/12/22. 50% WB in boot week 1; 75% WB in boot week 2; 100% WB in boot week 3.  Active/[id number] assist ankle ROM. May start slow, gradual strengthening once full WB in boot.  Modalities as needed.  May continue to progress with physical therapy per protocol.  After 1 week if full weight-bearing in a tall boot may then transition to normal shoe wear.  We did discuss wearing supportive tennis shoes versus lace-up boots.    PTA Visit #: 4/5     Time In: 0903  Time Out: 0943  Total Billable Time: 40 minutes    SUBJECTIVE     Pt reports: no pain today. Did not wear boot this weekend 2* no pain. Didn't get to visit her mother, so she didn't have to wear the boot for long distance, but stayed busy this weekend.       She was compliant with home exercise program except resistance band (instructed to withhold for recovery from excessive walking previously and to resume today).  Response to previous treatment: doing well, less soreness  Functional change: transition to tennis shoe without ankle pain.     Pain: 4-5/10  Location: left ankle    OBJECTIVE     Objective Measures updated at progress report unless specified.     Treatment     Cindy received the treatments listed below:        manual therapy techniques: for 10 minutes, including:  Scar tissue mobilization at medial and lateral incision sites, retrograde massage to foot/ankle/lower leg due to swelling.  Active Assist Range of Motion to ankle/foot/toes in all planes as tolerated.        therapeutic exercises for 30 minutes including:  (can start FWB in normal supportive shoes 1/6/2023)  Toe flexion/extension x 20  Toe splay x 20  Toe flexion with doming to toe extension with Eversion FROM as tolerated x 20-NP  Ankle 4 way with red band x 20  Upper case alphabet x 2-NP  Lower case alphabet x 2-NP  Ankle circles x 20 each direction  Gentle gastroc stretch with strap 5 x 10s  Gentle soleus stretch with strap 5 x 10s       Seated -NP time  LAQ x 30-NP  Dorsiflexion taps , two cones x 20-NP  Heel raises with elbow leaning into Left thigh 3 x 15  [place] pick ups into Eversion  [place] pick ups into Inversion  Fitter board A-P 20  Fitter board  "left-right x 20   Towel scrunch x 15    Standing:  Single leg stance 3 x 30s  Minisquat x 10-stopped 2* bilateral knee discomfort  Sit<>stand from 18" box x 10-NP  Side stepping x 3 laps in parallel bars  Forward walking x 4 laps over cones, working on stance time, clearance, step lengths       BAPS board ROM x 10 each way      Cindy participated in gait training to improve functional mobility and safety for 00 minutes, including:    Walking around gym and obstacles with cues for increased stance time left for symmetry and erect posture.     CP applied to the left ankle x 0 minutes in supine with elevator after being cleared for contraindications.-declined, to perform at home      Plan for Next Visit:  Assess patients response to the last visit following manual therapy intervention, therapeutic exercises, pt education, gait training and HEP.  Assess alignment, joint and soft tissue mobility, flexibility, scar tissue, swelling, recruitment, control, balance, gait.  Manual therapy to address alignment, mobility, flexibility, tenderness, soft tissue restrictions and/or presence of trigger points.  Add stretching, stabilization, joint and soft tissue mobility, balance and strengthening exercises as appropriate.  Modalities, Dry Needling if indicated.           Patient Education and Home Exercises     Home Exercises Provided and Patient Education Provided     Education provided:   - Educated pt that he/she may feel soreness after session.    -Orthopedic precautions  Educated pt that he/she may feel soreness after session.      Written Home Exercises Provided: . Instructed patient to continue current home exercise program. Also  instructed to resume resistance band once per day.     Exercises were reviewed and Cindy was able to demonstrate them prior to the end of the session.  Cindy demonstrated good  understanding of the education provided. See EMR under Patient Instructions for exercises provided during " therapy sessions    ASSESSMENT     Improving pain overall and increased time walking in tennis shoes, and did not wear boot at all this past weekend. Good tolerance for addition of stretching and strengthening exercises, and gait exercises. Limited stance time left and minimal correction after session. Mild edema today, decreased vs previous sessions.     Cindy Is progressing well towards her goals.   Pt prognosis is Good.     Pt will continue to benefit from skilled outpatient physical therapy to address the deficits listed in the problem list box on initial evaluation, provide pt/family education and to maximize pt's level of independence in the home and community environment.     Pt's spiritual, cultural and educational needs considered and pt agreeable to plan of care and goals.     Anticipated barriers to physical therapy: Schedule conflicts, transportation, pain, guarding, tolerance, endurance.  Joint, soft tissue and scar tissue restrictions following surgery and prolonged immobilization    Goals:   Short-Term: 4 weeks (1/20/2023)   ongoing  Pt will improve Left ankle dorsiflexion PROM to > or = 10* to promote return to prior functional status.  Pt will improve left ankle plantarflexion PROM to > or = 37* to promote return to prior functional status.  Pt will improve left ankle inversion PROM to > or = 22* to promote return to prior functional status.  Pt will improve left ankle eversion PROM to > or = 10* to promote return to prior functional status.  Pt will be able to progress weight bearing with/without boot as needed with < 3-4/10 pain  Pt will demonstrate improved gait pattern as evidenced by good gait pattern with boot on with/without boot on Left Lower Extremity.  Pt will maintain pain levels < or = to 3-4/10 to help promote appropriate progression of PT, HEP, weight bearing and ADL's    Pt will be compliant with new home exercise program to assist with PT intervention and help allow appropriate  progression through plan of care.     Long-Term: 12 weeks (3/17/2023)   ongoing  Pt will improve left ankle AROM to WFL-WNL to allow return to normal activities of daily living.  Pt will improve left ankle strength to > or = 5/5 to allow performance of activities of daily living.  Pt will improve left knee strength to > or = 5/5 to allow performance of activities of daily living.  Pt will improve bilateral hip strength to > or = 5-/5 to allow performance of activities of daily living.  Pt will display good reciprocating gait pattern without AD to improve QOL and allow return to prior functional status.  Pt will report < or = 2/10 pain during activities of daily living to improve QOL and allow return to prior functional status.  Pt will perform single limb balance on stable surface with appropriate balance reactions for > or = 30 seconds to allow safe return to prior level of function.  Pt will be able to negotiate walking stairs and on uneven surfaces with normal shoes, without AD with good balance and gait pattern.   Pt will be compliant and independent with HEP to allow and maintain better participation in normal activities  Pt will be able to resume normals ADL's, IADL's, previous activities    PLAN     Continue per POC, progressing as appropriate to achieve stated goals.    Continue with: Plan of care Certification: 12/19/2022 to 3/17/2023.        Outpatient Physical Therapy 2 times weekly for 12 weeks to include the following interventions: Electrical Stimulation attended/unattended, Gait Training, Manual Therapy, Moist Heat/ Ice, Neuromuscular Re-ed, Orthotic Management and Training, Patient Education, Therapeutic Activities, Therapeutic Exercise, Ultrasound, and dry needling.     Renay Nichols, PTA

## 2023-01-18 NOTE — PROGRESS NOTES
[facility]   Physical Therapy Treatment Note     Name: Cindy Ramirez  Clinic Number: [id number]    Therapy Diagnosis:   Encounter Diagnoses   Name Primary?    Acute left ankle pain Yes    Stiffness of left ankle joint     Joint stiffness of left foot     Altered gait     Impairment of balance     Weakness of left lower extremity      Physician: Allen Mauricio MD    Visit Date: 1/19/2023    Physician Orders: PT Eval and Treat   1-2 times per week for 6 to 8 weeks per ankle fracture protocol.  PT to start week of 12/12/22. 50% WB in boot week 1; 75% WB in boot week 2; 100% WB in boot week 3.  Active/Active assist ankle ROM. May start slow, gradual strengthening once full WB in boot. Modalities as needed.  May continue to progress with physical therapy per protocol.  After 1 week if full weight-bearing in a tall boot may then transition to normal shoe wear.  We did discuss wearing supportive tennis shoes versus lace-up boots.    Medical Diagnosis from Referral: Ankle syndesmosis disruption, left, initial encounter [S93.432A], Ankle fracture, bimalleolar, closed, left, initial encounter [S82.842A]  Evaluation Date: 12/19/2022  Authorization Period Expiration: 12/31/2022  Plan of Care Expiration: 3/17/2023  Progress Note Due: 2/17/2023(Last Progress Note 1/19/2023)  Visit # / Visits authorized: 5 / 20 +2 +eval  FOTO: Issued Visit #: 1/3, (capture on visit 1, 5, 10) first on 12/19/2022, 1/12/2023     Precautions: Standard and Weightbearing (see Dr gions), VIJI ESCALERA, procedure:  10/19/2022   Left trimalleolar ankle fracture open reduction internal fixation without posterior lip fixation.  Open reduction internal fixation of syndesmotic disruption.     1-2 times per week for 6 to 8 weeks per ankle fracture protocol.  PT to start week of 12/12/22. 50% WB in boot week 1; 75% WB in boot week 2; 100% WB in boot week 3.  Active/[id number] assist ankle ROM. May start slow, gradual strengthening  once full WB in boot. Modalities as needed.  May continue to progress with physical therapy per protocol.  After 1 week if full weight-bearing in a tall boot may then transition to normal shoe wear.  We did discuss wearing supportive tennis shoes versus lace-up boots.    PTA Visit #: 0/5     Time In: 10:45  Time Out: 11:30  Total Billable Time: 45 minutes    SUBJECTIVE     Pt reports: no pain today. Has been in tennis shoes all this week.  States she is getting more confidence with walking each day but has mostly been walking on level surfaces.    She was compliant with home exercise program except resistance band (instructed to withhold for recovery from excessive walking previously and to resume today).  Response to previous treatment: doing well, less soreness  Functional change: waking up stiff/sore but has been better able to transition to tennis shoe without ankle pain.  Has not used ortho boot in several days    Pain: 0/10  Location: left ankle    OBJECTIVE     Gait:    Initially walking with single crutch with boot on Left foot walking PWB in the clinic with step to gait pattern    Today walking with normal tennis shoes on with decreased terminal stance Left Lower Extremity with reduced stride length due to decreased Left ankle/foot ROM/mobility.     Palpation:  Increased scar tissue restrictions noted over lateral incision site>medial.       Flexibility:    Gastroc:  Right=  +10*  Left=    12* was +4*    Joint mobility:  Some joint restrictions noted midfoot, talocrural joints.  More restrictions noted at subtalar with decreased rearfoot eversion, distal tib-fib joint and talocrural restricting DF      Left  AROM/PROM    Ankle Dorsiflexion  13/15* was 2/6*    Ankle Plantarflexion  32/42* was 30/34*    Ankle Inversion  32/40* was 13/25*    Ankle Eversion  15/20* was 0/8*                              Right  AROM/PROM    Ankle Dorsiflexion  20/25*    Ankle Plantarflexion  40/45*    Ankle Inversion  33/40*     "Ankle Eversion  18/30*      Strength:     Left   Right    Ankle Dorsiflexion 5-/5 was 4+/5 5/5    Ankle Plantarflexion 5-/5 was 4+/5 5/5    Ankle Inversion 5-/5 was 4+/5 5/5    Ankle Eversion 5-/5 was 4/5 5/5    Hip Abduction 4/5 was 4-/5 4/5   Hip Extension 4/5 4/5   Hip External Rotation  4+/5 4+/5   Knee Flexion and Extension  5/5  5/5      Single Limb Balance:  Right= 30s was NT  Left= 3s was NT     Single heelraise:    Right= 3 reps FROM  Left=  3 reps 50% ROM    Treatment     Cindy received the treatments listed below:        manual therapy techniques: for 25 minutes, including:  Scar tissue mobilization at medial and lateral incision sites, retrograde massage to foot/ankle/lower leg due to swelling.  Subtalar joint mobs to improve rearfoot eversion, talar tilting, talocrural posterior glides to improve Dorsiflexion, distal tib-fib posterior glide with Dorsiflexion of ankle to help improve joint mobility into Dorsiflexion.  Scar tissue massage along posterior to inferior to anterior aspect around lateral malleolus due to restrictions.  PROM and AAROM to ankle/foot/toes in all planes as tolerated.      K-taping:  Strip from medial calcaneus wrapping diagonally in "reverse sling" angle to help improve rearfoot eversion  Strip from medial calcaneus wrapping under foot and laterally to create lateral stirrup to also improve rearfoot eversion      therapeutic exercises for 20 minutes including:  (can start FWB in normal supportive shoes 1/6/2023)  Ankle 4 way with red band x 20  Gentle gastroc stretch with strap 5 x 10s  Gentle soleus stretch with strap 5 x 10s       Seated -NP time  LAQ x 30-NP  Dorsiflexion taps , two cones x 20-NP  Heel raises with elbow leaning into Left thigh 3 x 15  [place] pick ups into Eversion  [place] pick ups into Inversion  Fitter board A-P 20  Fitter board left-right x 20   Towel scrunch x 15    Standing:  Single leg stance 3 x 30s  Tandem stance balance 2 x 30s each  Minisquat x " "10  Standing lunge stretch at stairs to help with Dorsiflexion glide x 10 x 10" holds  Sit<>stand from 18" box x 10-NP  Side stepping x 3 laps in parallel bars  Tandem walking  Toe walking  Forward walking x 4 laps over cones, working on stance time, clearance, step lengths       BAPS board ROM x 10 each way--np, time    Home exercise program only:  Toe flexion/extension x 20  Toe splay x 20  Toe flexion with doming to toe extension with Eversion FROM as tolerated x 20-NP  Upper case alphabet x 2-NP  Lower case alphabet x 2-NP  Ankle circles x 20 each direction    Cindy participated in gait training to improve functional mobility and safety for 00 minutes, including:    Walking around gym and obstacles with cues for increased stance time left for symmetry and erect posture.     CP applied to the left ankle x 0 minutes in supine with elevator after being cleared for contraindications.-declined, to perform at home      Plan for Next Visit:  Assess patients response to the last visit following manual therapy intervention, therapeutic exercises, pt education, gait training and HEP.  Assess alignment, joint and soft tissue mobility, flexibility, scar tissue, swelling, recruitment, control, balance, gait.  Manual therapy to address alignment, mobility, flexibility, tenderness, soft tissue restrictions and/or presence of trigger points.  Add stretching, stabilization, joint and soft tissue mobility, balance and strengthening exercises as appropriate.  Modalities, Dry Needling if indicated.       Patient Education and Home Exercises     Home Exercises Provided and Patient Education Provided     Education provided:   - Educated pt that he/she may feel soreness after session.    -Orthopedic precautions  Educated pt that he/she may feel soreness after session.      Added 1/19/2023:  Single leg stance, tandem balance, heelraises, toe walking, lunge ankle stretch    Written Home Exercises Provided: . Instructed patient to " continue current home exercise program. Also  instructed to resume resistance band once per day.     Exercises were reviewed and Cindy was able to demonstrate them prior to the end of the session.  Cindy demonstrated good  understanding of the education provided. See EMR under Patient Instructions for exercises provided during therapy sessions    ASSESSMENT     Improving pain overall and increased time walking in tennis shoes this past week.   Good progress so far towards her goals.  Despite her ROM and strength progressing she still has restricted subtalar, talocrural, distal tib-fib joint mobility.  Spent more time today performing manual therapy to improve talar tilting, subtalar joint eversion, talocrural and distal tib-fib mobility to improve Dorsiflexion.  She was also very tenderness to palpation with mobilization over her lateral ankle with scar tissue mobilization.    Following the manual therapy and K-taping she was able to report her foot/ankle felt better with walking with some improvements in her balance and mobility allowing better Left Lower Extremity terminal stance phase of gait due to improved Dorsiflexion mobility.     Cindy Is progressing well towards her goals.   Pt prognosis is Good.     Pt will continue to benefit from skilled outpatient physical therapy to address the deficits listed in the problem list box on initial evaluation, provide pt/family education and to maximize pt's level of independence in the home and community environment.     Pt's spiritual, cultural and educational needs considered and pt agreeable to plan of care and goals.     Anticipated barriers to physical therapy: Schedule conflicts, transportation, pain, guarding, tolerance, endurance.  Joint, soft tissue and scar tissue restrictions following surgery and prolonged immobilization    Goals:   Short-Term: 4 weeks (1/20/2023)   met  Pt will improve Left ankle dorsiflexion PROM to > or = 10* to promote return to  prior functional status.  Pt will improve left ankle plantarflexion PROM to > or = 37* to promote return to prior functional status.  Pt will improve left ankle inversion PROM to > or = 22* to promote return to prior functional status.  Pt will improve left ankle eversion PROM to > or = 10* to promote return to prior functional status.  Pt will be able to progress weight bearing with/without boot as needed with < 3-4/10 pain  Pt will demonstrate improved gait pattern as evidenced by good gait pattern with boot on with/without boot on Left Lower Extremity.  Pt will maintain pain levels < or = to 3-4/10 to help promote appropriate progression of PT, HEP, weight bearing and ADL's    Pt will be compliant with new home exercise program to assist with PT intervention and help allow appropriate progression through plan of care.     Long-Term: 12 weeks (3/17/2023)   progressing  Pt will improve left ankle AROM to WFL-WNL to allow return to normal activities of daily living.  Pt will improve left ankle strength to > or = 5/5 to allow performance of activities of daily living.  Pt will improve left knee strength to > or = 5/5 to allow performance of activities of daily living.  Pt will improve bilateral hip strength to > or = 5-/5 to allow performance of activities of daily living.  Pt will display good reciprocating gait pattern without AD to improve QOL and allow return to prior functional status.  Pt will report < or = 2/10 pain during activities of daily living to improve QOL and allow return to prior functional status.  Pt will perform single limb balance on stable surface with appropriate balance reactions for > or = 30 seconds to allow safe return to prior level of function.  Pt will be able to negotiate walking stairs and on uneven surfaces with normal shoes, without AD with good balance and gait pattern.   Pt will be compliant and independent with HEP to allow and maintain better participation in normal  activities  Pt will be able to resume normals ADL's, IADL's, previous activities    PLAN     Continue per POC, progressing as appropriate to achieve stated goals.    Continue with: Plan of care Certification: 12/19/2022 to 3/17/2023.        Outpatient Physical Therapy 2 times weekly for 12 weeks to include the following interventions: Electrical Stimulation attended/unattended, Gait Training, Manual Therapy, Moist Heat/ Ice, Neuromuscular Re-ed, Orthotic Management and Training, Patient Education, Therapeutic Activities, Therapeutic Exercise, Ultrasound, and dry needling.     Lyle Barajas, PT

## 2023-01-19 ENCOUNTER — CLINICAL SUPPORT (OUTPATIENT)
Dept: REHABILITATION | Facility: HOSPITAL | Age: 73
End: 2023-01-19
Payer: MEDICARE

## 2023-01-19 DIAGNOSIS — M25.572 ACUTE LEFT ANKLE PAIN: Primary | ICD-10-CM

## 2023-01-19 DIAGNOSIS — R26.89 IMPAIRMENT OF BALANCE: ICD-10-CM

## 2023-01-19 DIAGNOSIS — M25.672 STIFFNESS OF LEFT ANKLE JOINT: ICD-10-CM

## 2023-01-19 DIAGNOSIS — R29.898 WEAKNESS OF LEFT LOWER EXTREMITY: ICD-10-CM

## 2023-01-19 DIAGNOSIS — M25.675 JOINT STIFFNESS OF LEFT FOOT: ICD-10-CM

## 2023-01-19 DIAGNOSIS — R26.9 ALTERED GAIT: ICD-10-CM

## 2023-01-19 PROCEDURE — 97110 THERAPEUTIC EXERCISES: CPT | Mod: PN

## 2023-01-19 PROCEDURE — 97140 MANUAL THERAPY 1/> REGIONS: CPT | Mod: PN

## 2023-01-23 NOTE — PROGRESS NOTES
[facility]   Physical Therapy Treatment Note     Name: Cindy Ramirez  Clinic Number: [id number]    Therapy Diagnosis:   Encounter Diagnoses   Name Primary?    Acute left ankle pain Yes    Stiffness of left ankle joint     Joint stiffness of left foot     Altered gait     Impairment of balance     Weakness of left lower extremity        Physician: Allen Mauricio MD    Visit Date: 1/24/2023    Physician Orders: PT Eval and Treat   1-2 times per week for 6 to 8 weeks per ankle fracture protocol.  PT to start week of 12/12/22. 50% WB in boot week 1; 75% WB in boot week 2; 100% WB in boot week 3.  Active/Active assist ankle ROM. May start slow, gradual strengthening once full WB in boot. Modalities as needed.  May continue to progress with physical therapy per protocol.  After 1 week if full weight-bearing in a tall boot may then transition to normal shoe wear.  We did discuss wearing supportive tennis shoes versus lace-up boots.    Medical Diagnosis from Referral: Ankle syndesmosis disruption, left, initial encounter [S93.432A], Ankle fracture, bimalleolar, closed, left, initial encounter [S82.842A]  Evaluation Date: 12/19/2022  Authorization Period Expiration: 12/31/2022  Plan of Care Expiration: 3/17/2023  Progress Note Due: 2/17/2023(Last Progress Note 1/19/2023)  Visit # / Visits authorized: 6 / 20 +2 +eval  FOTO: Issued Visit #: 1/3, (capture on visit 1, 5, 10) first on 12/19/2022, 1/12/2023     Precautions: Standard and Weightbearing (see Dr goins), VIJI ESCALERA, procedure:  10/19/2022   Left trimalleolar ankle fracture open reduction internal fixation without posterior lip fixation.  Open reduction internal fixation of syndesmotic disruption.     1-2 times per week for 6 to 8 weeks per ankle fracture protocol.  PT to start week of 12/12/22. 50% WB in boot week 1; 75% WB in boot week 2; 100% WB in boot week 3.  Active/[id number] assist ankle ROM. May start slow, gradual  "strengthening once full WB in boot. Modalities as needed.  May continue to progress with physical therapy per protocol.  After 1 week if full weight-bearing in a tall boot may then transition to normal shoe wear.  We did discuss wearing supportive tennis shoes versus lace-up boots.    PTA Visit #: 1/5     Time In: 0831  Time Out: 0936  Total Billable Time: 57 minutes    SUBJECTIVE     Pt reports: "Barely a one." Feeling better with walking and was able to walk all weekend at a campground without pain, three miles yesterday.     She was compliant with home exercise program except durign the trip over the weekend.   Response to previous treatment: doing well, less soreness  Functional change: waking up stiff/sore but has been better able to transition to tennis shoe without ankle pain.  Has not used ortho boot in several days    Pain: <1/10  Location: left ankle    OBJECTIVE   Last session:   Gait:    Initially walking with single crutch with boot on Left foot walking PWB in the clinic with step to gait pattern    Today walking with normal tennis shoes on with decreased terminal stance Left Lower Extremity with reduced stride length due to decreased Left ankle/foot ROM/mobility.     Palpation:  Increased scar tissue restrictions noted over lateral incision site>medial.       Flexibility:    Gastroc:  Right=  +10*  Left=    12* was +4*    Joint mobility:  Some joint restrictions noted midfoot, talocrural joints.  More restrictions noted at subtalar with decreased rearfoot eversion, distal tib-fib joint and talocrural restricting DF      Left  AROM/PROM    Ankle Dorsiflexion  13/15* was 2/6*    Ankle Plantarflexion  32/42* was 30/34*    Ankle Inversion  32/40* was 13/25*    Ankle Eversion  15/20* was 0/8*                              Right  AROM/PROM    Ankle Dorsiflexion  20/25*    Ankle Plantarflexion  40/45*    Ankle Inversion  33/40*    Ankle Eversion  18/30*      Strength:     Left   Right    Ankle Dorsiflexion " "5-/5 was 4+/5 5/5    Ankle Plantarflexion 5-/5 was 4+/5 5/5    Ankle Inversion 5-/5 was 4+/5 5/5    Ankle Eversion 5-/5 was 4/5 5/5    Hip Abduction 4/5 was 4-/5 4/5   Hip Extension 4/5 4/5   Hip External Rotation  4+/5 4+/5   Knee Flexion and Extension  5/5  5/5      Single Limb Balance:  Right= 30s was NT  Left= 3s was NT     Single heelraise:    Right= 3 reps FROM  Left=  3 reps 50% ROM    Treatment     Cindy received the treatments listed below:        manual therapy techniques: for 25 minutes, including:  Scar tissue mobilization at medial and lateral incision sites, retrograde massage to foot/ankle/lower leg due to swelling.  Subtalar joint mobs to improve rearfoot eversion, talar tilting, talocrural posterior glides to improve Dorsiflexion, distal tib-fib posterior glide with Dorsiflexion of ankle to help improve joint mobility into Dorsiflexion.  Scar tissue massage along posterior to inferior to anterior aspect around lateral malleolus due to restrictions.  PROM and AAROM to ankle/foot/toes in all planes as tolerated.      K-taping:  Strip from medial calcaneus wrapping diagonally in "reverse sling" angle to help improve rearfoot eversion  Strip from medial calcaneus wrapping under foot and laterally to create lateral stirrup to also improve rearfoot eversion      therapeutic exercises for 40 minutes including:  (can start FWB in normal supportive shoes 1/6/2023)  Ankle 4 way with red band x 20  Gentle gastroc stretch with strap 5 x 10s  Gentle soleus stretch with strap 5 x 10s  [place] pick ups into Eversion  [place] pick ups into Inversion     Seated -NP time  LAQ x 30-NP  Dorsiflexion taps , two cones x 20-NP  Heel raises with elbow leaning into Left thigh 3 x 15  BAPS board ROM x 10 each way  Fitter board A-P 20  Fitter board left-right x 20   Towel scrunch x 15    Standing:  Single leg stance 3 x 30s  Tandem stance balance 2 x 30s each  Minisquat 2 x 10  Standing lunge stretch at stairs to help with " "Dorsiflexion glide x 10 x 10" holds  Sit<>stand from 18" box x 10-NP time  Side stepping x 2 laps  Tandem walking  Toe walking-NP  Forward walking x 4 laps over cones, working on stance time, clearance, step lengths-NP time         Home exercise program only:  Toe flexion/extension x 20  Toe splay x 20  Toe flexion with doming to toe extension with Eversion FROM as tolerated x 20-NP  Upper case alphabet x 2-NP  Lower case alphabet x 2-NP  Ankle circles x 20 each direction    Cindy participated in gait training to improve functional mobility and safety for 00 minutes, including:    Walking around gym and obstacles with cues for increased stance time left for symmetry and erect posture.     CP applied to the left ankle x 0 minutes in supine with elevator after being cleared for contraindications.-declined, to perform at home      Plan for Next Visit:  Assess patients response to the last visit following manual therapy intervention, therapeutic exercises, pt education, gait training and HEP.  Assess alignment, joint and soft tissue mobility, flexibility, scar tissue, swelling, recruitment, control, balance, gait.  Manual therapy to address alignment, mobility, flexibility, tenderness, soft tissue restrictions and/or presence of trigger points.  Add stretching, stabilization, joint and soft tissue mobility, balance and strengthening exercises as appropriate.  Modalities, Dry Needling if indicated.       Patient Education and Home Exercises     Home Exercises Provided and Patient Education Provided     Education provided:   - Educated pt that he/she may feel soreness after session.    -Orthopedic precautions  Educated pt that he/she may feel soreness after session.      Added 1/19/2023:  Single leg stance, tandem balance, heelraises, toe walking, lunge ankle stretch    Written Home Exercises Provided: . Instructed patient to continue current home exercise program.     Exercises were reviewed and Cindy was able to " demonstrate them prior to the end of the session.  Cindy demonstrated good  understanding of the education provided. See EMR under Patient Instructions for exercises provided during therapy sessions    ASSESSMENT     Improved pain with increased functional mobility and able to walk at a campground all weekend to include three miles yesterday without pain. Mild edema left ankle and foot. Extra time spent in manual therapy to improve comfort with DF to allow increased step lengths with gait. Improved gait quality with improved symmetry after session, and pain resolved.     Cindy Is progressing well towards her goals.   Pt prognosis is Good.     Pt will continue to benefit from skilled outpatient physical therapy to address the deficits listed in the problem list box on initial evaluation, provide pt/family education and to maximize pt's level of independence in the home and community environment.     Pt's spiritual, cultural and educational needs considered and pt agreeable to plan of care and goals.     Anticipated barriers to physical therapy: Schedule conflicts, transportation, pain, guarding, tolerance, endurance.  Joint, soft tissue and scar tissue restrictions following surgery and prolonged immobilization    Goals:   Short-Term: 4 weeks (1/20/2023)   met  Pt will improve Left ankle dorsiflexion PROM to > or = 10* to promote return to prior functional status.  Pt will improve left ankle plantarflexion PROM to > or = 37* to promote return to prior functional status.  Pt will improve left ankle inversion PROM to > or = 22* to promote return to prior functional status.  Pt will improve left ankle eversion PROM to > or = 10* to promote return to prior functional status.  Pt will be able to progress weight bearing with/without boot as needed with < 3-4/10 pain  Pt will demonstrate improved gait pattern as evidenced by good gait pattern with boot on with/without boot on Left Lower Extremity.  Pt will maintain  pain levels < or = to 3-4/10 to help promote appropriate progression of PT, HEP, weight bearing and ADL's    Pt will be compliant with new home exercise program to assist with PT intervention and help allow appropriate progression through plan of care.     Long-Term: 12 weeks (3/17/2023)   progressing  Pt will improve left ankle AROM to WFL-WNL to allow return to normal activities of daily living.  Pt will improve left ankle strength to > or = 5/5 to allow performance of activities of daily living.  Pt will improve left knee strength to > or = 5/5 to allow performance of activities of daily living.  Pt will improve bilateral hip strength to > or = 5-/5 to allow performance of activities of daily living.  Pt will display good reciprocating gait pattern without AD to improve QOL and allow return to prior functional status.  Pt will report < or = 2/10 pain during activities of daily living to improve QOL and allow return to prior functional status.  Pt will perform single limb balance on stable surface with appropriate balance reactions for > or = 30 seconds to allow safe return to prior level of function.  Pt will be able to negotiate walking stairs and on uneven surfaces with normal shoes, without AD with good balance and gait pattern.   Pt will be compliant and independent with HEP to allow and maintain better participation in normal activities  Pt will be able to resume normals ADL's, IADL's, previous activities    PLAN     Continue per POC, progressing as appropriate to achieve stated goals.    Continue with: Plan of care Certification: 12/19/2022 to 3/17/2023.        Outpatient Physical Therapy 2 times weekly for 12 weeks to include the following interventions: Electrical Stimulation attended/unattended, Gait Training, Manual Therapy, Moist Heat/ Ice, Neuromuscular Re-ed, Orthotic Management and Training, Patient Education, Therapeutic Activities, Therapeutic Exercise, Ultrasound, and dry needling.     Renay  Simone, PTA

## 2023-01-24 ENCOUNTER — CLINICAL SUPPORT (OUTPATIENT)
Dept: REHABILITATION | Facility: HOSPITAL | Age: 73
End: 2023-01-24
Payer: MEDICARE

## 2023-01-24 DIAGNOSIS — R29.898 WEAKNESS OF LEFT LOWER EXTREMITY: ICD-10-CM

## 2023-01-24 DIAGNOSIS — R26.9 ALTERED GAIT: ICD-10-CM

## 2023-01-24 DIAGNOSIS — M25.572 ACUTE LEFT ANKLE PAIN: Primary | ICD-10-CM

## 2023-01-24 DIAGNOSIS — M25.672 STIFFNESS OF LEFT ANKLE JOINT: ICD-10-CM

## 2023-01-24 DIAGNOSIS — R26.89 IMPAIRMENT OF BALANCE: ICD-10-CM

## 2023-01-24 DIAGNOSIS — M25.675 JOINT STIFFNESS OF LEFT FOOT: ICD-10-CM

## 2023-01-24 PROCEDURE — 97110 THERAPEUTIC EXERCISES: CPT | Mod: PN,CQ

## 2023-01-24 PROCEDURE — 97140 MANUAL THERAPY 1/> REGIONS: CPT | Mod: PN,CQ

## 2023-01-25 ENCOUNTER — PATIENT MESSAGE (OUTPATIENT)
Dept: ADMINISTRATIVE | Facility: HOSPITAL | Age: 73
End: 2023-01-25
Payer: MEDICARE

## 2023-01-26 ENCOUNTER — CLINICAL SUPPORT (OUTPATIENT)
Dept: REHABILITATION | Facility: HOSPITAL | Age: 73
End: 2023-01-26
Payer: MEDICARE

## 2023-01-26 DIAGNOSIS — R26.89 IMPAIRMENT OF BALANCE: ICD-10-CM

## 2023-01-26 DIAGNOSIS — R26.9 ALTERED GAIT: ICD-10-CM

## 2023-01-26 DIAGNOSIS — M25.672 STIFFNESS OF LEFT ANKLE JOINT: ICD-10-CM

## 2023-01-26 DIAGNOSIS — M25.572 ACUTE LEFT ANKLE PAIN: Primary | ICD-10-CM

## 2023-01-26 DIAGNOSIS — M25.675 JOINT STIFFNESS OF LEFT FOOT: ICD-10-CM

## 2023-01-26 DIAGNOSIS — R29.898 WEAKNESS OF LEFT LOWER EXTREMITY: ICD-10-CM

## 2023-01-26 PROCEDURE — 97140 MANUAL THERAPY 1/> REGIONS: CPT | Mod: PN

## 2023-01-26 PROCEDURE — 97110 THERAPEUTIC EXERCISES: CPT | Mod: PN

## 2023-01-26 NOTE — PROGRESS NOTES
[facility]   Physical Therapy Treatment Note     Name: Cindy Ramirez  Clinic Number: [id number]    Therapy Diagnosis:   Encounter Diagnoses   Name Primary?    Acute left ankle pain Yes    Stiffness of left ankle joint     Joint stiffness of left foot     Altered gait     Impairment of balance     Weakness of left lower extremity        Physician: Allen Mauricio MD    Visit Date: 1/26/2023    Physician Orders: PT Eval and Treat   1-2 times per week for 6 to 8 weeks per ankle fracture protocol.  PT to start week of 12/12/22. 50% WB in boot week 1; 75% WB in boot week 2; 100% WB in boot week 3.  Active/Active assist ankle ROM. May start slow, gradual strengthening once full WB in boot. Modalities as needed.  May continue to progress with physical therapy per protocol.  After 1 week if full weight-bearing in a tall boot may then transition to normal shoe wear.  We did discuss wearing supportive tennis shoes versus lace-up boots.    Medical Diagnosis from Referral: Ankle syndesmosis disruption, left, initial encounter [S93.432A], Ankle fracture, bimalleolar, closed, left, initial encounter [S82.842A]  Evaluation Date: 12/19/2022  Authorization Period Expiration: 12/31/2022  Plan of Care Expiration: 3/17/2023  Progress Note Due: 2/17/2023(Last Progress Note 1/19/2023)  Visit # / Visits authorized: 8 / 20 +2 +eval  FOTO: Issued Visit #: 1/3, (capture on visit 1, 5, 10) first on 12/19/2022, 1/12/2023     Precautions: Standard and Weightbearing (see Dr goins), VIJI ESCALERA, procedure:  10/19/2022   Left trimalleolar ankle fracture open reduction internal fixation without posterior lip fixation.  Open reduction internal fixation of syndesmotic disruption.     1-2 times per week for 6 to 8 weeks per ankle fracture protocol.  PT to start week of 12/12/22. 50% WB in boot week 1; 75% WB in boot week 2; 100% WB in boot week 3.  Active/[id number] assist ankle ROM. May start slow, gradual  "strengthening once full WB in boot. Modalities as needed.  May continue to progress with physical therapy per protocol.  After 1 week if full weight-bearing in a tall boot may then transition to normal shoe wear.  We did discuss wearing supportive tennis shoes versus lace-up boots.    PTA Visit #: 1/5     Time In:  8:30  Time Out: 9:15  Total Billable Time: 45 minutes    SUBJECTIVE     Pt reports: "Barely a one." Feeling better with walking.  A little stiffness that feels better after manual therapy.  Needs her balance to get better.    She was compliant with home exercise program except durign the trip over the weekend.   Response to previous treatment: doing well, less soreness  Functional change: waking up stiff/sore but has been better able to transition to tennis shoe without ankle pain.  Has not used ortho boot in several days    Pain: <1/10  Location: left ankle    OBJECTIVE   Last Progress Note 1/19/2023      Treatment     Cindy received the treatments listed below:        manual therapy techniques: for 15 minutes, including:  Scar tissue mobilization at medial and lateral incision sites, retrograde massage to foot/ankle/lower leg due to swelling.  Subtalar joint mobs to improve rearfoot eversion, talar tilting, talocrural posterior glides to improve Dorsiflexion, distal tib-fib posterior glide with Dorsiflexion of ankle to help improve joint mobility into Dorsiflexion.  Scar tissue massage along posterior to inferior to anterior aspect around lateral malleolus due to restrictions.  PROM and AAROM to ankle/foot/toes in all planes as tolerated.      K-taping:  Strip from medial calcaneus wrapping diagonally in "reverse sling" angle to help improve rearfoot eversion  Strip from medial calcaneus wrapping under foot and laterally to create lateral stirrup to also improve rearfoot eversion      therapeutic exercises for 30 minutes including:  (can start FWB in normal supportive shoes 1/6/2023)  Ankle 4 way " "with red band x 20  Gentle gastroc stretch with strap 5 x 10s  Gentle soleus stretch with strap 5 x 10s  [place] pick ups into Eversion  [place] pick ups into Inversion     Seated -NP time  LAQ x 30-NP  Dorsiflexion taps , two cones x 20-NP  Heel raises with elbow leaning into Left thigh 3 x 15  [employer] board ROM x 10 each way  Towel scrunch x 15    Standing:  Fitter board A-P 20  Fitter board left-right x 20   Single leg stance 2 x 30s  Tandem stance balance 2 x 30s each  Minisquat 2 x 10  Rapid heel raises on trampoline 3 x 20 (walk out to rest between)   Standing lunge stretch at stairs to help with Dorsiflexion glide x 10 each side  Sit<>stand from 18" box x 10-NP time  Side stepping x 2 laps  Tandem walking  Toe walking-NP  Forward walking x 4 laps over cones, working on stance time, clearance, step lengths-NP time         Home exercise program only:  Toe flexion/extension x 20  Toe splay x 20  Toe flexion with doming to toe extension with Eversion FROM as tolerated x 20-NP  Upper case alphabet x 2-NP  Lower case alphabet x 2-NP  Ankle circles x 20 each direction    Cindy participated in gait training to improve functional mobility and safety for 00 minutes, including:    Walking around gym and obstacles with cues for increased stance time left for symmetry and erect posture.     CP applied to the left ankle x 0 minutes in supine with elevator after being cleared for contraindications.-declined, to perform at home      Plan for Next Visit:  Assess patients response to the last visit following manual therapy intervention, therapeutic exercises, pt education, gait training and HEP.  Assess alignment, joint and soft tissue mobility, flexibility, scar tissue, swelling, recruitment, control, balance, gait.  Manual therapy to address alignment, mobility, flexibility, tenderness, soft tissue restrictions and/or presence of trigger points.  Add stretching, stabilization, joint and soft tissue mobility, balance and " strengthening exercises as appropriate.  Modalities, Dry Needling if indicated.       Patient Education and Home Exercises     Home Exercises Provided and Patient Education Provided     Education provided:   - Educated pt that he/she may feel soreness after session.    -Orthopedic precautions  Educated pt that he/she may feel soreness after session.      Added 1/19/2023:  Single leg stance, tandem balance, heelraises, toe walking, lunge ankle stretch    Written Home Exercises Provided: . Instructed patient to continue current home exercise program.     Exercises were reviewed and Cindy was able to demonstrate them prior to the end of the session.  Cindy demonstrated good  understanding of the education provided. See EMR under Patient Instructions for exercises provided during therapy sessions    ASSESSMENT     Pt returning to PT today continuing to report improvements in her ROM, mobility, strength, balance, gait, and pain.  Still presents with some stiffness in her ankle that benefits from taping to help promote better alignment in weight bearing.  Continue to progress strength, mobility and balance as tolerated.    Cindy Is progressing well towards her goals.   Pt prognosis is Good.     Pt will continue to benefit from skilled outpatient physical therapy to address the deficits listed in the problem list box on initial evaluation, provide pt/family education and to maximize pt's level of independence in the home and community environment.     Pt's spiritual, cultural and educational needs considered and pt agreeable to plan of care and goals.     Anticipated barriers to physical therapy: Schedule conflicts, transportation, pain, guarding, tolerance, endurance.  Joint, soft tissue and scar tissue restrictions following surgery and prolonged immobilization    Goals:   Short-Term: 4 weeks (1/20/2023)   met  Pt will improve Left ankle dorsiflexion PROM to > or = 10* to promote return to prior functional  status.  Pt will improve left ankle plantarflexion PROM to > or = 37* to promote return to prior functional status.  Pt will improve left ankle inversion PROM to > or = 22* to promote return to prior functional status.  Pt will improve left ankle eversion PROM to > or = 10* to promote return to prior functional status.  Pt will be able to progress weight bearing with/without boot as needed with < 3-4/10 pain  Pt will demonstrate improved gait pattern as evidenced by good gait pattern with boot on with/without boot on Left Lower Extremity.  Pt will maintain pain levels < or = to 3-4/10 to help promote appropriate progression of PT, HEP, weight bearing and ADL's    Pt will be compliant with new home exercise program to assist with PT intervention and help allow appropriate progression through plan of care.     Long-Term: 12 weeks (3/17/2023)   progressing  Pt will improve left ankle AROM to WFL-WNL to allow return to normal activities of daily living.  Pt will improve left ankle strength to > or = 5/5 to allow performance of activities of daily living.  Pt will improve left knee strength to > or = 5/5 to allow performance of activities of daily living.  Pt will improve bilateral hip strength to > or = 5-/5 to allow performance of activities of daily living.  Pt will display good reciprocating gait pattern without AD to improve QOL and allow return to prior functional status.  Pt will report < or = 2/10 pain during activities of daily living to improve QOL and allow return to prior functional status.  Pt will perform single limb balance on stable surface with appropriate balance reactions for > or = 30 seconds to allow safe return to prior level of function.  Pt will be able to negotiate walking stairs and on uneven surfaces with normal shoes, without AD with good balance and gait pattern.   Pt will be compliant and independent with HEP to allow and maintain better participation in normal activities  Pt will be able to  resume normals ADL's, IADL's, previous activities    PLAN     Continue per POC, progressing as appropriate to achieve stated goals.    Continue with: Plan of care Certification: 12/19/2022 to 3/17/2023.        Outpatient Physical Therapy 2 times weekly for 12 weeks to include the following interventions: Electrical Stimulation attended/unattended, Gait Training, Manual Therapy, Moist Heat/ Ice, Neuromuscular Re-ed, Orthotic Management and Training, Patient Education, Therapeutic Activities, Therapeutic Exercise, Ultrasound, and dry needling.     Lyle Barajas, PT

## 2023-01-30 NOTE — PROGRESS NOTES
[facility]   Physical Therapy Treatment Note     Name: Cindy Ramirez  Clinic Number: [id number]    Therapy Diagnosis:   Encounter Diagnoses   Name Primary?    Acute left ankle pain Yes    Stiffness of left ankle joint     Joint stiffness of left foot     Altered gait     Impairment of balance     Weakness of left lower extremity      Physician: Allne Mauricio MD    Visit Date: 1/31/2023    Physician Orders: PT Eval and Treat   1-2 times per week for 6 to 8 weeks per ankle fracture protocol.  PT to start week of 12/12/22. 50% WB in boot week 1; 75% WB in boot week 2; 100% WB in boot week 3.  Active/Active assist ankle ROM. May start slow, gradual strengthening once full WB in boot. Modalities as needed.  May continue to progress with physical therapy per protocol.  After 1 week if full weight-bearing in a tall boot may then transition to normal shoe wear.  We did discuss wearing supportive tennis shoes versus lace-up boots.    Medical Diagnosis from Referral: Ankle syndesmosis disruption, left, initial encounter [S93.432A], Ankle fracture, bimalleolar, closed, left, initial encounter [S82.842A]  Evaluation Date: 12/19/2022  Authorization Period Expiration: 12/31/2022  Plan of Care Expiration: 3/17/2023  Progress Note Due: 2/17/2023(Last Progress Note 1/19/2023)  Visit # / Visits authorized: 9 / 20 +2 +eval  FOTO: Issued Visit #: 3/3, (capture on visit 1, 5, 10) first on 12/19/2022, 1/12/2023     Precautions: Standard and Weightbearing (see Dr goins), VIJI ESCALERA, procedure:  10/19/2022   Left trimalleolar ankle fracture open reduction internal fixation without posterior lip fixation.  Open reduction internal fixation of syndesmotic disruption.     1-2 times per week for 6 to 8 weeks per ankle fracture protocol.  PT to start week of 12/12/22. 50% WB in boot week 1; 75% WB in boot week 2; 100% WB in boot week 3.  Active/[id number] assist ankle ROM. May start slow, gradual strengthening  "once full WB in boot. Modalities as needed.  May continue to progress with physical therapy per protocol.  After 1 week if full weight-bearing in a tall boot may then transition to normal shoe wear.  We did discuss wearing supportive tennis shoes versus lace-up boots.    PTA Visit #: 1/5     Time In:  9:15  Time Out: 10:00  Total Billable Time: 45 minutes    SUBJECTIVE     Pt reports: she has been having a little c/o pain/pinching along the front of her ankle but not too bad.  Felt a lot better with no pain during Dorsiflexion following manual therapy release of the calf tightness.  She is feeling really good overall and is interested in decreasing PT to 1x/week as she transitions to home exercise program.    She was compliant with home exercise program except durign the trip over the weekend.   Response to previous treatment: doing well, less soreness  Functional change: waking up less stiff/sore, loosening up as she gets up and moving around.  In normal shoes now for almost 2 weeks    Pain: <1/10  Location: left ankle    OBJECTIVE   Last Progress Note 1/19/2023      Treatment     Cindy received the treatments listed below:        manual therapy techniques: for 15 minutes, including:  Scar tissue mobilization at medial and lateral incision sites, retrograde massage to foot/ankle/lower leg due to swelling.  Subtalar joint mobs to improve rearfoot eversion, talar tilting, talocrural posterior glides to improve Dorsiflexion, distal tib-fib posterior glide with Dorsiflexion of ankle to help improve joint mobility into Dorsiflexion.  Scar tissue massage along posterior to inferior to anterior aspect around lateral malleolus due to restrictions.  PROM and AAROM to ankle/foot/toes in all planes as tolerated.      K-taping:  Strip from medial calcaneus wrapping diagonally in "reverse sling" angle to help improve rearfoot eversion  Strip from medial calcaneus wrapping under foot and laterally to create lateral stirrup to " "also improve rearfoot eversion      therapeutic exercises for 30 minutes including:  (can start FWB in normal supportive shoes 1/6/2023)  Ankle 4 way with red band x 20  Gentle gastroc stretch with strap 5 x 10s  Gentle soleus stretch with strap 5 x 10s  [place] pick ups into Eversion  [place] pick ups into Inversion     Standing:  Minisquat  x 10, 5" hold  Gastroc stretch on incline board 2 x 60"  Soleus stretch on incline board 2 x 60"  Standing lunge stretch at stairs to help with Dorsiflexion glide x 10 each side  Tandem stance balance 2 x 30s each  Single leg stance 2 x 30s  Fitter board A-P 20  Fitter board left-right x 20  Rapid heel raises on trampoline 3 x 20 (walk out to rest between)   Sit<>stand from 18" box x 10-NP time  Side stepping x 2 laps  Tandem walking  Toe walking-NP yet  Forward walking x 4 laps over cones, working on stance time, clearance, step lengths-NP time         Home exercise program only:  Toe flexion/extension x 20  Toe splay x 20  Toe flexion with doming to toe extension with Eversion FROM as tolerated x 20-NP  Upper case alphabet x 2-NP  Lower case alphabet x 2-NP  Ankle circles x 20 each direction    Cindy participated in gait training to improve functional mobility and safety for 00 minutes, including:    Walking around gym and obstacles with cues for increased stance time left for symmetry and erect posture.     CP applied to the left ankle x 0 minutes in supine with elevator after being cleared for contraindications.-declined, to perform at home      Plan for Next Visit:  Assess patients response to the last visit following manual therapy intervention, therapeutic exercises, pt education, gait training and HEP.  Assess alignment, joint and soft tissue mobility, flexibility, scar tissue, swelling, recruitment, control, balance, gait.  Manual therapy to address alignment, mobility, flexibility, tenderness, soft tissue restrictions and/or presence of trigger points.  Add " stretching, stabilization, joint and soft tissue mobility, balance and strengthening exercises as appropriate.  Modalities, Dry Needling if indicated.       Patient Education and Home Exercises     Home Exercises Provided and Patient Education Provided     Education provided:   - Educated pt that he/she may feel soreness after session.    -Orthopedic precautions  Educated pt that he/she may feel soreness after session.      Added 1/19/2023:  Single leg stance, tandem balance, heelraises, toe walking, lunge ankle stretch    Written Home Exercises Provided: . Instructed patient to continue current home exercise program.     Exercises were reviewed and Cindy was able to demonstrate them prior to the end of the session.  Cindy demonstrated good  understanding of the education provided. See EMR under Patient Instructions for exercises provided during therapy sessions    ASSESSMENT     Pt returning to PT today continuing to report improvements in her ROM, mobility, strength, balance, gait, and pain.  Still presents with some stiffness in her ankle that benefits from taping to help promote better alignment in weight bearing along with manual therapy release to tight calf muscles.  Continue to progress strength, mobility and balance as tolerated.    Cindy Is progressing well towards her goals.   Pt prognosis is Good.     Pt will continue to benefit from skilled outpatient physical therapy to address the deficits listed in the problem list box on initial evaluation, provide pt/family education and to maximize pt's level of independence in the home and community environment.     Pt's spiritual, cultural and educational needs considered and pt agreeable to plan of care and goals.     Anticipated barriers to physical therapy: Schedule conflicts, transportation, pain, guarding, tolerance, endurance.  Joint, soft tissue and scar tissue restrictions following surgery and prolonged immobilization    Goals:   Short-Term: 4  weeks (1/20/2023)   met  Pt will improve Left ankle dorsiflexion PROM to > or = 10* to promote return to prior functional status.  Pt will improve left ankle plantarflexion PROM to > or = 37* to promote return to prior functional status.  Pt will improve left ankle inversion PROM to > or = 22* to promote return to prior functional status.  Pt will improve left ankle eversion PROM to > or = 10* to promote return to prior functional status.  Pt will be able to progress weight bearing with/without boot as needed with < 3-4/10 pain  Pt will demonstrate improved gait pattern as evidenced by good gait pattern with boot on with/without boot on Left Lower Extremity.  Pt will maintain pain levels < or = to 3-4/10 to help promote appropriate progression of PT, HEP, weight bearing and ADL's    Pt will be compliant with new home exercise program to assist with PT intervention and help allow appropriate progression through plan of care.     Long-Term: 12 weeks (3/17/2023)   progressing  Pt will improve left ankle AROM to WFL-WNL to allow return to normal activities of daily living.  Pt will improve left ankle strength to > or = 5/5 to allow performance of activities of daily living.  Pt will improve left knee strength to > or = 5/5 to allow performance of activities of daily living.  Pt will improve bilateral hip strength to > or = 5-/5 to allow performance of activities of daily living.  Pt will display good reciprocating gait pattern without AD to improve QOL and allow return to prior functional status.  Pt will report < or = 2/10 pain during activities of daily living to improve QOL and allow return to prior functional status.  Pt will perform single limb balance on stable surface with appropriate balance reactions for > or = 30 seconds to allow safe return to prior level of function.  Pt will be able to negotiate walking stairs and on uneven surfaces with normal shoes, without AD with good balance and gait pattern.   Pt will  be compliant and independent with HEP to allow and maintain better participation in normal activities  Pt will be able to resume normals ADL's, IADL's, previous activities    PLAN     Continue per POC, progressing as appropriate to achieve stated goals.    Continue with: Plan of care Certification: 12/19/2022 to 3/17/2023.        Outpatient Physical Therapy 2 times weekly for 12 weeks to include the following interventions: Electrical Stimulation attended/unattended, Gait Training, Manual Therapy, Moist Heat/ Ice, Neuromuscular Re-ed, Orthotic Management and Training, Patient Education, Therapeutic Activities, Therapeutic Exercise, Ultrasound, and dry needling.     Lyle Barajas, PT

## 2023-01-31 ENCOUNTER — CLINICAL SUPPORT (OUTPATIENT)
Dept: REHABILITATION | Facility: HOSPITAL | Age: 73
End: 2023-01-31
Payer: MEDICARE

## 2023-01-31 DIAGNOSIS — R29.898 WEAKNESS OF LEFT LOWER EXTREMITY: ICD-10-CM

## 2023-01-31 DIAGNOSIS — R26.89 IMPAIRMENT OF BALANCE: ICD-10-CM

## 2023-01-31 DIAGNOSIS — M25.672 STIFFNESS OF LEFT ANKLE JOINT: ICD-10-CM

## 2023-01-31 DIAGNOSIS — M25.572 ACUTE LEFT ANKLE PAIN: Primary | ICD-10-CM

## 2023-01-31 DIAGNOSIS — R26.9 ALTERED GAIT: ICD-10-CM

## 2023-01-31 DIAGNOSIS — M25.675 JOINT STIFFNESS OF LEFT FOOT: ICD-10-CM

## 2023-01-31 PROCEDURE — 97110 THERAPEUTIC EXERCISES: CPT | Mod: PN

## 2023-01-31 PROCEDURE — 97140 MANUAL THERAPY 1/> REGIONS: CPT | Mod: PN

## 2023-02-06 DIAGNOSIS — S82.842A ANKLE FRACTURE, BIMALLEOLAR, CLOSED, LEFT, INITIAL ENCOUNTER: Primary | ICD-10-CM

## 2023-02-07 NOTE — PROGRESS NOTES
[facility]   Physical Therapy Treatment Note     Name: Cindy Ramirez  Clinic Number: [id number]    Therapy Diagnosis:   Encounter Diagnoses   Name Primary?    Acute left ankle pain Yes    Stiffness of left ankle joint     Joint stiffness of left foot     Altered gait     Impairment of balance     Weakness of left lower extremity      Physician: Allen Mauricio MD    Visit Date: 2/8/2023    Physician Orders: PT Eval and Treat   1-2 times per week for 6 to 8 weeks per ankle fracture protocol.  PT to start week of 12/12/22. 50% WB in boot week 1; 75% WB in boot week 2; 100% WB in boot week 3.  Active/Active assist ankle ROM. May start slow, gradual strengthening once full WB in boot. Modalities as needed.  May continue to progress with physical therapy per protocol.  After 1 week if full weight-bearing in a tall boot may then transition to normal shoe wear.  We did discuss wearing supportive tennis shoes versus lace-up boots.    Medical Diagnosis from Referral: Ankle syndesmosis disruption, left, initial encounter [S93.432A], Ankle fracture, bimalleolar, closed, left, initial encounter [S82.842A]  Evaluation Date: 12/19/2022  Authorization Period Expiration: 12/31/2022  Plan of Care Expiration: 3/17/2023  Progress Note Due: 2/17/2023(Last Progress Note 1/19/2023)  Visit # / Visits authorized: 10 / 20 +2 +eval  FOTO: Issued Visit #: 3/3, (capture on visit 1, 5, 10) first on 12/19/2022, 1/12/2023     Precautions: Standard and Weightbearing (see Dr goins), VIJI ESCALERA, procedure:  10/19/2022   Left trimalleolar ankle fracture open reduction internal fixation without posterior lip fixation.  Open reduction internal fixation of syndesmotic disruption.     1-2 times per week for 6 to 8 weeks per ankle fracture protocol.  PT to start week of 12/12/22. 50% WB in boot week 1; 75% WB in boot week 2; 100% WB in boot week 3.  Active/[id number] assist ankle ROM. May start slow, gradual strengthening  "once full WB in boot. Modalities as needed.  May continue to progress with physical therapy per protocol.  After 1 week if full weight-bearing in a tall boot may then transition to normal shoe wear.  We did discuss wearing supportive tennis shoes versus lace-up boots.    PTA Visit #: 0/5     Time In:  11:30  Time Out: 12:15  Total Billable Time: 45 minutes    SUBJECTIVE     Pt reports: she has been having a little c/o pain/pinching. Less at front of her ankle and more along side of her ankle and lower leg.  She feels like she is more sore after going camping recently and did a lot of hiking at the camp site she was at.      She was compliant with home exercise program except durign the trip over the weekend.   Response to previous treatment: doing well, less soreness, better movement, getting stronger  Functional change: waking up less stiff/sore, loosening up as she gets up and moving around.  Able to walk a lot more and has even been hiking on uneven terrain.    Pain: 1-2/10  Location: left ankle    OBJECTIVE   Last Progress Note 1/19/2023      Treatment     Cindy received the treatments listed below:        manual therapy techniques: for 10 minutes, including:  Scar tissue mobilization at medial and lateral incision sites, retrograde massage to foot/ankle/lower leg due to swelling.  Subtalar joint mobs to improve rearfoot eversion, talar tilting, talocrural posterior glides to improve Dorsiflexion, distal tib-fib posterior glide with Dorsiflexion of ankle to help improve joint mobility into Dorsiflexion.  Scar tissue massage along posterior to inferior to anterior aspect around lateral malleolus due to restrictions.  PROM and AAROM to ankle/foot/toes in all planes as tolerated.      K-taping:  Strip from medial calcaneus wrapping diagonally in "reverse sling" angle to help improve rearfoot eversion  Strip from medial calcaneus wrapping under foot and laterally to create lateral stirrup to also improve rearfoot " "eversion      therapeutic exercises for 35 minutes including:  (can start FWB in normal supportive shoes 1/6/2023)  Ankle 4 way with green band x 20  Gentle gastroc stretch with strap 5 x 10s  Gentle soleus stretch with strap 5 x 10s  [place] pick ups into Eversion  [place] pick ups into Inversion  Towel crunches     Standing:  Sit<>stand from chair x 10  Minisquat  x 10, 5" hold  Gastroc stretch on incline board 2 x 60"  Soleus stretch on incline board 2 x 60"  Standing lunge stretch at stairs to help with Dorsiflexion glide x 10 each side  Tandem stance balance 2 x 30s each  Single leg stance 2 x 30s  Standing heel raises 2 x 10  Fitter board A-P 20--np, time  Fitter board left-right x 20--np, time  Rapid heel raises on trampoline 3 x 20 (walk out to rest between)   Side stepping x 2 laps  Tandem walking  Toe walking-NP yet  Forward walking x 4 laps over cones, working on stance time, clearance, step lengths-NP time         Home exercise program only:  Toe flexion/extension x 20  Toe splay x 20  Toe flexion with doming to toe extension with Eversion FROM as tolerated x 20-NP  Upper case alphabet x 2-NP  Lower case alphabet x 2-NP  Ankle circles x 20 each direction      Plan for Next Visit:  Assess patients response to the last visit following manual therapy intervention, therapeutic exercises, pt education, gait training and HEP.  Assess alignment, joint and soft tissue mobility, flexibility, scar tissue, swelling, recruitment, control, balance, gait.  Manual therapy to address alignment, mobility, flexibility, tenderness, soft tissue restrictions and/or presence of trigger points.  Add stretching, stabilization, joint and soft tissue mobility, balance and strengthening exercises as appropriate.  Modalities, Dry Needling if indicated.       Patient Education and Home Exercises     Home Exercises Provided and Patient Education Provided     Education provided:   - Educated pt that he/she may feel soreness after " session.    -Orthopedic precautions  Educated pt that he/she may feel soreness after session.      Added 1/19/2023:  Single leg stance, tandem balance, heelraises, toe walking, lunge ankle stretch    Written Home Exercises Provided: . Instructed patient to continue current home exercise program.     Exercises were reviewed and Cindy was able to demonstrate them prior to the end of the session.  Cindy demonstrated good  understanding of the education provided. See EMR under Patient Instructions for exercises provided during therapy sessions    ASSESSMENT     Pt returning to PT today continuing to report improvements in her ROM, mobility, strength, balance, gait, and pain.  Still presents with some stiffness in her ankle that benefits from taping to help promote better alignment in weight bearing along with manual therapy release to tight calf muscles.  Continue to progress strength, mobility and balance as tolerated.    Cindy Is progressing well towards her goals.   Pt prognosis is Good.     Pt will continue to benefit from skilled outpatient physical therapy to address the deficits listed in the problem list box on initial evaluation, provide pt/family education and to maximize pt's level of independence in the home and community environment.     Pt's spiritual, cultural and educational needs considered and pt agreeable to plan of care and goals.     Anticipated barriers to physical therapy: Schedule conflicts, transportation, pain, guarding, tolerance, endurance.  Joint, soft tissue and scar tissue restrictions following surgery and prolonged immobilization    Goals:   Short-Term: 4 weeks (1/20/2023)   met  Pt will improve Left ankle dorsiflexion PROM to > or = 10* to promote return to prior functional status.  Pt will improve left ankle plantarflexion PROM to > or = 37* to promote return to prior functional status.  Pt will improve left ankle inversion PROM to > or = 22* to promote return to prior  functional status.  Pt will improve left ankle eversion PROM to > or = 10* to promote return to prior functional status.  Pt will be able to progress weight bearing with/without boot as needed with < 3-4/10 pain  Pt will demonstrate improved gait pattern as evidenced by good gait pattern with boot on with/without boot on Left Lower Extremity.  Pt will maintain pain levels < or = to 3-4/10 to help promote appropriate progression of PT, HEP, weight bearing and ADL's    Pt will be compliant with new home exercise program to assist with PT intervention and help allow appropriate progression through plan of care.     Long-Term: 12 weeks (3/17/2023)   progressing  Pt will improve left ankle AROM to WFL-WNL to allow return to normal activities of daily living.  Pt will improve left ankle strength to > or = 5/5 to allow performance of activities of daily living.  Pt will improve left knee strength to > or = 5/5 to allow performance of activities of daily living.  Pt will improve bilateral hip strength to > or = 5-/5 to allow performance of activities of daily living.  Pt will display good reciprocating gait pattern without AD to improve QOL and allow return to prior functional status.  Pt will report < or = 2/10 pain during activities of daily living to improve QOL and allow return to prior functional status.  Pt will perform single limb balance on stable surface with appropriate balance reactions for > or = 30 seconds to allow safe return to prior level of function.  Pt will be able to negotiate walking stairs and on uneven surfaces with normal shoes, without AD with good balance and gait pattern.   Pt will be compliant and independent with HEP to allow and maintain better participation in normal activities  Pt will be able to resume normals ADL's, IADL's, previous activities    PLAN     Continue per POC, progressing as appropriate to achieve stated goals.    Continue with: Plan of care Certification: 12/19/2022 to  3/17/2023.        Outpatient Physical Therapy 2 times weekly for 12 weeks to include the following interventions: Electrical Stimulation attended/unattended, Gait Training, Manual Therapy, Moist Heat/ Ice, Neuromuscular Re-ed, Orthotic Management and Training, Patient Education, Therapeutic Activities, Therapeutic Exercise, Ultrasound, and dry needling.     Lyle Barajas, PT

## 2023-02-08 ENCOUNTER — CLINICAL SUPPORT (OUTPATIENT)
Dept: REHABILITATION | Facility: HOSPITAL | Age: 73
End: 2023-02-08
Payer: MEDICARE

## 2023-02-08 ENCOUNTER — TELEPHONE (OUTPATIENT)
Dept: PULMONOLOGY | Facility: CLINIC | Age: 73
End: 2023-02-08
Payer: MEDICARE

## 2023-02-08 DIAGNOSIS — R26.89 IMPAIRMENT OF BALANCE: ICD-10-CM

## 2023-02-08 DIAGNOSIS — R29.898 WEAKNESS OF LEFT LOWER EXTREMITY: ICD-10-CM

## 2023-02-08 DIAGNOSIS — M25.675 JOINT STIFFNESS OF LEFT FOOT: ICD-10-CM

## 2023-02-08 DIAGNOSIS — R26.9 ALTERED GAIT: ICD-10-CM

## 2023-02-08 DIAGNOSIS — M25.672 STIFFNESS OF LEFT ANKLE JOINT: ICD-10-CM

## 2023-02-08 DIAGNOSIS — M25.572 ACUTE LEFT ANKLE PAIN: Primary | ICD-10-CM

## 2023-02-08 PROCEDURE — 97140 MANUAL THERAPY 1/> REGIONS: CPT | Mod: PN

## 2023-02-08 PROCEDURE — 97110 THERAPEUTIC EXERCISES: CPT | Mod: PN

## 2023-02-10 ENCOUNTER — HOSPITAL ENCOUNTER (OUTPATIENT)
Dept: RADIOLOGY | Facility: HOSPITAL | Age: 73
Discharge: HOME | End: 2023-02-10
Attending: ORTHOPAEDIC SURGERY
Payer: MEDICARE

## 2023-02-10 ENCOUNTER — OFFICE VISIT (OUTPATIENT)
Dept: ORTHOPEDICS | Facility: CLINIC | Age: 73
End: 2023-02-10
Payer: MEDICARE

## 2023-02-10 DIAGNOSIS — S82.842A ANKLE FRACTURE, BIMALLEOLAR, CLOSED, LEFT, INITIAL ENCOUNTER: ICD-10-CM

## 2023-02-10 DIAGNOSIS — S93.432A ANKLE SYNDESMOSIS DISRUPTION, LEFT, INITIAL ENCOUNTER: ICD-10-CM

## 2023-02-10 DIAGNOSIS — T84.498A MECHANICAL COMPLICATION OF INTERNAL ORTHOPEDIC IMPLANT, INITIAL ENCOUNTER: Primary | ICD-10-CM

## 2023-02-10 PROCEDURE — 99213 PR OFFICE/OUTPT VISIT, EST, LEVL III, 20-29 MIN: ICD-10-PCS | Mod: S$PBB,,, | Performed by: ORTHOPAEDIC SURGERY

## 2023-02-10 PROCEDURE — 99999 PR PBB SHADOW E&M-EST. PATIENT-LVL III: CPT | Mod: PBBFAC,,, | Performed by: ORTHOPAEDIC SURGERY

## 2023-02-10 PROCEDURE — 73610 X-RAY EXAM OF ANKLE: CPT | Mod: TC,PO,LT

## 2023-02-10 PROCEDURE — 73610 XR ANKLE COMPLETE 3 VIEW LEFT: ICD-10-PCS | Mod: 26,LT,, | Performed by: RADIOLOGY

## 2023-02-10 PROCEDURE — 73610 X-RAY EXAM OF ANKLE: CPT | Mod: 26,LT,, | Performed by: RADIOLOGY

## 2023-02-10 PROCEDURE — 99999 PR PBB SHADOW E&M-EST. PATIENT-LVL III: ICD-10-PCS | Mod: PBBFAC,,, | Performed by: ORTHOPAEDIC SURGERY

## 2023-02-10 PROCEDURE — 99213 OFFICE O/P EST LOW 20 MIN: CPT | Mod: PBBFAC,PN | Performed by: ORTHOPAEDIC SURGERY

## 2023-02-10 PROCEDURE — 99213 OFFICE O/P EST LOW 20 MIN: CPT | Mod: S$PBB,,, | Performed by: ORTHOPAEDIC SURGERY

## 2023-02-10 NOTE — PROGRESS NOTES
Status/Diagnosis: Displaced Left trimalleolar ankle fracture.  Date of Surgery: 10/19/2022  Date of Injury: 10/11/2022  Return visit: 2 weeks postop  X-rays on Return: WB 3-views Left ankle    Chief Complaint:   Chief Complaint   Patient presents with    Left Ankle - Post-op Evaluation     Present History:  Cindy Ramirez is a 72 y.o. female who presents for routine postop evaluation.  Patient continues to do very well.  She is ambulating in normal shoe wear without difficulty. Denies any complaints of pain.  Little to no residual ankle swelling.  Denies any numbness or tingling.    Past Medical History:   Diagnosis Date    Arthritis     Asthma     Basal cell carcinoma 2014    right cheek    Cataract     Hyperlipidemia     Hypertension     Insomnia        Past Surgical History:   Procedure Laterality Date    BACK SURGERY      CATARACT EXTRACTION W/  INTRAOCULAR LENS IMPLANT Right 08/22/2019    +12.5    COLONOSCOPY N/A 1/8/2020    Procedure: COLONOSCOPY;  Surgeon: Jameson Tovar MD;  Location: [facility] ENDO;  Service: Endoscopy;  Laterality: N/A;    EYE SURGERY      cataract ou    FIXATION OF SYNDESMOSIS OF ANKLE Left 10/19/2022    Procedure: FIXATION, SYNDESMOSIS, ANKLE;  Surgeon: Allen Mauricio MD;  Location: [facility] OR;  Service: Orthopedics;  Laterality: Left;    NECK SURGERY  2000    corpectomy    OPEN REDUCTION AND INTERNAL FIXATION (ORIF) OF INJURY OF ANKLE Left 10/19/2022    Procedure: ORIF, ANKLE;  Surgeon: Allen Mauricio MD;  Location: [facility] OR;  Service: Orthopedics;  Laterality: Left;    SPINAL FUSION      TONSILLECTOMY      tonsils         Current Outpatient Medications   Medication Sig    albuterol (PROAIR HFA) 90 mcg/actuation inhaler Inhale 2 puffs into the lungs every 4 (four) hours as needed. Rescue    albuterol (PROVENTIL/VENTOLIN HFA) 90 mcg/actuation inhaler Inhale 2 puffs into the lungs every 6 (six) hours as needed for Wheezing or Shortness of Breath. Rescue    aspirin (ECOTRIN) 81 MG EC  tablet Take 81 mg by mouth once daily.    calcium-vitamin D3 (OS-SHABANA 500 + D3) 500 mg-5 mcg (200 unit) per tablet Take 1 tablet by mouth 2 (two) times daily with meals.    EScitalopram oxalate (LEXAPRO) 5 MG Tab Take 1 tablet (5 mg total) by mouth once daily.    fluticasone propionate (FLOVENT HFA) 110 mcg/actuation inhaler Inhale 1 puff into the lungs 2 (two) times daily. Controller    lisinopriL (PRINIVIL,ZESTRIL) 30 MG tablet Take 1 tablet (30 mg total) by mouth once daily.    multivitamin capsule Take 1 capsule by mouth once daily.    promethazine-dextromethorphan (PROMETHAZINE-DM) 6.25-15 mg/5 mL Syrp Take 5 mLs by mouth every 4 (four) hours as needed (cough).    azelastine (ASTELIN) 137 mcg (0.1 %) nasal spray 1 spray (137 mcg total) by Nasal route 2 (two) times daily.     No current facility-administered medications for this visit.       Review of patient's allergies indicates:   Allergen Reactions    Penicillins      Other reaction(s): Unknown       Family History   Problem Relation Age of Onset    Hypertension Mother     Macular degeneration Mother     Hypertension Father     Cancer Father         prostate    Pancreatic cancer Maternal Grandmother     Heart failure Maternal Grandfather     Dementia Paternal Grandmother     Breast cancer Maternal Aunt        Social History     Socioeconomic History    Marital status:    Occupational History     Employer: [employer]     Comment: retired (08/30/2015)   Tobacco Use    Smoking status: Never     Passive exposure: Never    Smokeless tobacco: Never   Substance and Sexual Activity    Alcohol use: Yes     Alcohol/week: 7.0 standard drinks     Types: 7 Glasses of wine per week    Drug use: No    Sexual activity: Yes     Partners: Male     Birth control/protection: None     Social Determinants of Health     Financial Resource Strain: Low Risk     Difficulty of Paying Living Expenses: Not hard at all   Food Insecurity: No Food Insecurity     Worried About Running Out of Food in the Last Year: Never true    Ran Out of Food in the Last Year: Never true   Transportation Needs: No Transportation Needs    Lack of Transportation (Medical): No    Lack of Transportation (Non-Medical): No   Physical Activity: Inactive    Days of Exercise per Week: 0 days    Minutes of Exercise per Session: 0 min   Stress: Stress Concern Present    Feeling of Stress : Very much   Social Connections: Moderately Integrated    Frequency of Communication with Friends and Family: More than three times a week    Frequency of Social Gatherings with Friends and Family: More than three times a week    Attends Rastafari Services: More than 4 times per year    Active Member of Clubs or Organizations: No    Attends Club or Organization Meetings: Never    Marital Status:    Housing Stability: Low Risk     Unable to Pay for Housing in the Last Year: No    Number of Places Lived in the Last Year: 1    Unstable Housing in the Last Year: No       Physical exam:  There were no vitals filed for this visit.  There is no height or weight on file to calculate BMI.  General: In no apparent distress; well developed and well nourished.  HEENT: normocephalic; atraumatic.  Cardiovascular: regular rate.  Respiratory: no increased work of breathing.  Musculoskeletal:   Inspection:  Surgical scars are well healed.  Little to no residual swelling. No signs or symptoms of infection.  No drainage, warmth, erythema.  Ankle range of motion from 5° dorsiflexion to 45° plantar flexion. Gross motor and sensory function intact.  Palpable pedal pulse.                Weightbearing three views left ankle:  Status post bimalleolar ankle fracture ORIF with trans syndesmotic screw fixation.  Overall alignment well-maintained.  Fractures appear well-healed. Congruent ankle mortise.  No evidence of implant loosening or failure.     Assessment:  Cindy Ramirez is a 72 y.o. female with acute displaced left  trimalleolar ankle fracture.  Date of injury:  10/11/2022.  STATUS POST Trimalleolar ankle fracture and syndesmosis ORIF on 10/19/2022.     Plan:   Clinical and radiographic findings were discussed.  Specifically discussed the potential for trans syndesmotic screw failure.  Described the natural history of this injury.  Operative vs nonoperative treatment options were described. These include but are not limited to bleeding; infection; damage to surrounding nerves or vessels; persistent pain, stiffness; recurrent deformity; need for additional procedures; amputation; blood clots; pulmonary embolus; cardiac events; stroke; and the general risks of anesthesia including anesthetic death.   We also reviewed postop protocol as well as limitations and expectations. Patient understands and desires to proceed with surgical intervention.   Explained risks, benefits, and alternative to the patient. Asked if any questions--none.  Surgery to include but not limited to:   Left ankle hardware removal; surgery as indicated.      Discuss patient to bring her tall cam boot to preoperative holding area.  We will plan to place a soft dressing and tall boot such that patient may bear weight immediately after surgery.      This note was created using voice recognition software and may contain grammatical errors.

## 2023-02-10 NOTE — H&P (VIEW-ONLY)
Status/Diagnosis: Displaced Left trimalleolar ankle fracture.  Date of Surgery: 10/19/2022  Date of Injury: 10/11/2022  Return visit: 2 weeks postop  X-rays on Return: WB 3-views Left ankle    Chief Complaint:   Chief Complaint   Patient presents with    Left Ankle - Post-op Evaluation     Present History:  Cindy Ramirez is a 72 y.o. female who presents for routine postop evaluation.  Patient continues to do very well.  She is ambulating in normal shoe wear without difficulty. Denies any complaints of pain.  Little to no residual ankle swelling.  Denies any numbness or tingling.    Past Medical History:   Diagnosis Date    Arthritis     Asthma     Basal cell carcinoma 2014    right cheek    Cataract     Hyperlipidemia     Hypertension     Insomnia        Past Surgical History:   Procedure Laterality Date    BACK SURGERY      CATARACT EXTRACTION W/  INTRAOCULAR LENS IMPLANT Right 08/22/2019    +12.5    COLONOSCOPY N/A 1/8/2020    Procedure: COLONOSCOPY;  Surgeon: Jameson Tovar MD;  Location: [facility] ENDO;  Service: Endoscopy;  Laterality: N/A;    EYE SURGERY      cataract ou    FIXATION OF SYNDESMOSIS OF ANKLE Left 10/19/2022    Procedure: FIXATION, SYNDESMOSIS, ANKLE;  Surgeon: lAlen Mauricio MD;  Location: [facility] OR;  Service: Orthopedics;  Laterality: Left;    NECK SURGERY  2000    corpectomy    OPEN REDUCTION AND INTERNAL FIXATION (ORIF) OF INJURY OF ANKLE Left 10/19/2022    Procedure: ORIF, ANKLE;  Surgeon: Allen Mauricio MD;  Location: [facility] OR;  Service: Orthopedics;  Laterality: Left;    SPINAL FUSION      TONSILLECTOMY      tonsils         Current Outpatient Medications   Medication Sig    albuterol (PROAIR HFA) 90 mcg/actuation inhaler Inhale 2 puffs into the lungs every 4 (four) hours as needed. Rescue    albuterol (PROVENTIL/VENTOLIN HFA) 90 mcg/actuation inhaler Inhale 2 puffs into the lungs every 6 (six) hours as needed for Wheezing or Shortness of Breath. Rescue    aspirin (ECOTRIN) 81 MG EC  tablet Take 81 mg by mouth once daily.    calcium-vitamin D3 (OS-SHABANA 500 + D3) 500 mg-5 mcg (200 unit) per tablet Take 1 tablet by mouth 2 (two) times daily with meals.    EScitalopram oxalate (LEXAPRO) 5 MG Tab Take 1 tablet (5 mg total) by mouth once daily.    fluticasone propionate (FLOVENT HFA) 110 mcg/actuation inhaler Inhale 1 puff into the lungs 2 (two) times daily. Controller    lisinopriL (PRINIVIL,ZESTRIL) 30 MG tablet Take 1 tablet (30 mg total) by mouth once daily.    multivitamin capsule Take 1 capsule by mouth once daily.    promethazine-dextromethorphan (PROMETHAZINE-DM) 6.25-15 mg/5 mL Syrp Take 5 mLs by mouth every 4 (four) hours as needed (cough).    azelastine (ASTELIN) 137 mcg (0.1 %) nasal spray 1 spray (137 mcg total) by Nasal route 2 (two) times daily.     No current facility-administered medications for this visit.       Review of patient's allergies indicates:   Allergen Reactions    Penicillins      Other reaction(s): Unknown       Family History   Problem Relation Age of Onset    Hypertension Mother     Macular degeneration Mother     Hypertension Father     Cancer Father         prostate    Pancreatic cancer Maternal Grandmother     Heart failure Maternal Grandfather     Dementia Paternal Grandmother     Breast cancer Maternal Aunt        Social History     Socioeconomic History    Marital status:    Occupational History     Employer: [employer]     Comment: retired (08/30/2015)   Tobacco Use    Smoking status: Never     Passive exposure: Never    Smokeless tobacco: Never   Substance and Sexual Activity    Alcohol use: Yes     Alcohol/week: 7.0 standard drinks     Types: 7 Glasses of wine per week    Drug use: No    Sexual activity: Yes     Partners: Male     Birth control/protection: None     Social Determinants of Health     Financial Resource Strain: Low Risk     Difficulty of Paying Living Expenses: Not hard at all   Food Insecurity: No Food Insecurity     Worried About Running Out of Food in the Last Year: Never true    Ran Out of Food in the Last Year: Never true   Transportation Needs: No Transportation Needs    Lack of Transportation (Medical): No    Lack of Transportation (Non-Medical): No   Physical Activity: Inactive    Days of Exercise per Week: 0 days    Minutes of Exercise per Session: 0 min   Stress: Stress Concern Present    Feeling of Stress : Very much   Social Connections: Moderately Integrated    Frequency of Communication with Friends and Family: More than three times a week    Frequency of Social Gatherings with Friends and Family: More than three times a week    Attends Zoroastrian Services: More than 4 times per year    Active Member of Clubs or Organizations: No    Attends Club or Organization Meetings: Never    Marital Status:    Housing Stability: Low Risk     Unable to Pay for Housing in the Last Year: No    Number of Places Lived in the Last Year: 1    Unstable Housing in the Last Year: No       Physical exam:  There were no vitals filed for this visit.  There is no height or weight on file to calculate BMI.  General: In no apparent distress; well developed and well nourished.  HEENT: normocephalic; atraumatic.  Cardiovascular: regular rate.  Respiratory: no increased work of breathing.  Musculoskeletal:   Inspection:  Surgical scars are well healed.  Little to no residual swelling. No signs or symptoms of infection.  No drainage, warmth, erythema.  Ankle range of motion from 5° dorsiflexion to 45° plantar flexion. Gross motor and sensory function intact.  Palpable pedal pulse.                Weightbearing three views left ankle:  Status post bimalleolar ankle fracture ORIF with trans syndesmotic screw fixation.  Overall alignment well-maintained.  Fractures appear well-healed. Congruent ankle mortise.  No evidence of implant loosening or failure.     Assessment:  Cindy Ramirez is a 72 y.o. female with acute displaced left  trimalleolar ankle fracture.  Date of injury:  10/11/2022.  STATUS POST Trimalleolar ankle fracture and syndesmosis ORIF on 10/19/2022.     Plan:   Clinical and radiographic findings were discussed.  Specifically discussed the potential for trans syndesmotic screw failure.  Described the natural history of this injury.  Operative vs nonoperative treatment options were described. These include but are not limited to bleeding; infection; damage to surrounding nerves or vessels; persistent pain, stiffness; recurrent deformity; need for additional procedures; amputation; blood clots; pulmonary embolus; cardiac events; stroke; and the general risks of anesthesia including anesthetic death.   We also reviewed postop protocol as well as limitations and expectations. Patient understands and desires to proceed with surgical intervention.   Explained risks, benefits, and alternative to the patient. Asked if any questions--none.  Surgery to include but not limited to:   Left ankle hardware removal; surgery as indicated.      Discuss patient to bring her tall cam boot to preoperative holding area.  We will plan to place a soft dressing and tall boot such that patient may bear weight immediately after surgery.      This note was created using voice recognition software and may contain grammatical errors.

## 2023-02-14 NOTE — PROGRESS NOTES
[facility]   Physical Therapy Treatment Note     Name: Cindy Ramirez  Clinic Number: [id number]    Therapy Diagnosis:   Encounter Diagnoses   Name Primary?    Acute left ankle pain Yes    Stiffness of left ankle joint     Joint stiffness of left foot     Altered gait     Impairment of balance     Weakness of left lower extremity      Physician: Allen Mauricio MD    Visit Date: 2/15/2023    Physician Orders: PT Eval and Treat   1-2 times per week for 6 to 8 weeks per ankle fracture protocol.  PT to start week of 12/12/22. 50% WB in boot week 1; 75% WB in boot week 2; 100% WB in boot week 3.  Active/Active assist ankle ROM. May start slow, gradual strengthening once full WB in boot. Modalities as needed.  May continue to progress with physical therapy per protocol.  After 1 week if full weight-bearing in a tall boot may then transition to normal shoe wear.  We did discuss wearing supportive tennis shoes versus lace-up boots.    Medical Diagnosis from Referral: Ankle syndesmosis disruption, left, initial encounter [S93.432A], Ankle fracture, bimalleolar, closed, left, initial encounter [S82.842A]  Evaluation Date: 12/19/2022  Authorization Period Expiration: 12/31/2022  Plan of Care Expiration: 3/17/2023  Progress Note Due: 2/17/2023(Last Progress Note 1/19/2023)  Visit # / Visits authorized: 11 / 20 +2 +eval  FOTO: Issued Visit #: 3/3, (capture on visit 1, 5, 10) first on 12/19/2022, 1/12/2023     Precautions: Standard and Weightbearing (see Dr goins), VIJI ESCALERA, procedure:  10/19/2022   Left trimalleolar ankle fracture open reduction internal fixation without posterior lip fixation.  Open reduction internal fixation of syndesmotic disruption.     1-2 times per week for 6 to 8 weeks per ankle fracture protocol.  PT to start week of 12/12/22. 50% WB in boot week 1; 75% WB in boot week 2; 100% WB in boot week 3.  Active/[id number] assist ankle ROM. May start slow, gradual strengthening  "once full WB in boot. Modalities as needed.  May continue to progress with physical therapy per protocol.  After 1 week if full weight-bearing in a tall boot may then transition to normal shoe wear.  We did discuss wearing supportive tennis shoes versus lace-up boots.    PTA Visit #: 0/5     Time In:  11:30  Time Out: 12:15  Total Billable Time: 45 minutes    SUBJECTIVE     Pt reports: she is having surgery next week to have painful hardware removed.  Otherwise she feels she is doing well.      She was compliant with home exercise program except durign the trip over the weekend.   Response to previous treatment: doing well, less soreness, better movement, getting stronger  Functional change: waking up less stiff/sore, loosening up as she gets up and moving around.  Able to walk a lot more and has even been hiking on uneven terrain.    Pain: 0/10  Location: left ankle    OBJECTIVE   Last Progress Note 1/19/2023      Treatment     Cindy received the treatments listed below:        manual therapy techniques: for 00 minutes, including:  Scar tissue mobilization at medial and lateral incision sites, retrograde massage to foot/ankle/lower leg due to swelling.  Subtalar joint mobs to improve rearfoot eversion, talar tilting, talocrural posterior glides to improve Dorsiflexion, distal tib-fib posterior glide with Dorsiflexion of ankle to help improve joint mobility into Dorsiflexion.  Scar tissue massage along posterior to inferior to anterior aspect around lateral malleolus due to restrictions.  PROM and AAROM to ankle/foot/toes in all planes as tolerated.      K-taping:  Strip from medial calcaneus wrapping diagonally in "reverse sling" angle to help improve rearfoot eversion  Strip from medial calcaneus wrapping under foot and laterally to create lateral stirrup to also improve rearfoot eversion      therapeutic exercises for 45 minutes including:  (can start FWB in normal supportive shoes 1/6/2023)  Ankle 4 way with " "blue band x 20  Gentle gastroc stretch with strap 5 x 10s  Gentle soleus stretch with strap 5 x 10s  [employer] [place] pick ups into Eversion  [employer] [place] pick ups into Inversion  Towel crunches     Standing:  Sit<>stand from chair x 10  Minisquat  x 10, 5" hold  Gastroc stretch on incline board 2 x 60"  Soleus stretch on incline board 2 x 60"  Standing lunge stretch at stairs to help with Dorsiflexion glide x 10 each side  Standing heel raises off edge of steps 2 x 10  Tandem stance balance 2 x 30s each  Single leg stance 2 x 30s  Fitter board A-P 20--np, time  Fitter board left-right x 20--np, time  Rapid heel raises on trampoline 3 x 20 (walk out to rest between)   Rapid heel raises on trampoline out 2, in 2 hops 3 x 20 (rest between)  Side stepping x 2 laps  Tandem walking  Toe walking-NP yet  Forward walking x 4 laps over cones, working on stance time, clearance, step lengths-NP time         Home exercise program only:  Toe flexion/extension x 20  Toe splay x 20  Toe flexion with doming to toe extension with Eversion FROM as tolerated x 20-NP  Upper case alphabet x 2-NP  Lower case alphabet x 2-NP  Ankle circles x 20 each direction      Plan for Next Visit:  Assess patients response to the last visit following manual therapy intervention, therapeutic exercises, pt education, gait training and HEP.  Assess alignment, joint and soft tissue mobility, flexibility, scar tissue, swelling, recruitment, control, balance, gait.  Manual therapy to address alignment, mobility, flexibility, tenderness, soft tissue restrictions and/or presence of trigger points.  Add stretching, stabilization, joint and soft tissue mobility, balance and strengthening exercises as appropriate.  Modalities, Dry Needling if indicated.       Patient Education and Home Exercises     Home Exercises Provided and Patient Education Provided     Education provided:   - Educated pt that he/she may feel soreness after session.    -Orthopedic precautions  Educated pt " that he/she may feel soreness after session.      Added 1/19/2023:  Single leg stance, tandem balance, heelraises, toe walking, lunge ankle stretch    Written Home Exercises Provided: . Instructed patient to continue current home exercise program.     Exercises were reviewed and Cindy was able to demonstrate them prior to the end of the session.  Cindy demonstrated good  understanding of the education provided. See EMR under Patient Instructions for exercises provided during therapy sessions    ASSESSMENT     Pt returning to PT today continuing to report improvements in her ROM, mobility, strength, balance, gait, and pain.  Still presents with some stiffness in her ankle with underlying weakness.  Continue to progress strength, mobility and balance as tolerated.  She will be out with surgery next week to have painful hardware removal.  Might return once cleared per Dr goins.    Cindy Is progressing well towards her goals.   Pt prognosis is Good.     Pt will continue to benefit from skilled outpatient physical therapy to address the deficits listed in the problem list box on initial evaluation, provide pt/family education and to maximize pt's level of independence in the home and community environment.     Pt's spiritual, cultural and educational needs considered and pt agreeable to plan of care and goals.     Anticipated barriers to physical therapy: Schedule conflicts, transportation, pain, guarding, tolerance, endurance.  Joint, soft tissue and scar tissue restrictions following surgery and prolonged immobilization    Goals:   Short-Term: 4 weeks (1/20/2023)   met  Pt will improve Left ankle dorsiflexion PROM to > or = 10* to promote return to prior functional status.  Pt will improve left ankle plantarflexion PROM to > or = 37* to promote return to prior functional status.  Pt will improve left ankle inversion PROM to > or = 22* to promote return to prior functional status.  Pt will improve left ankle  eversion PROM to > or = 10* to promote return to prior functional status.  Pt will be able to progress weight bearing with/without boot as needed with < 3-4/10 pain  Pt will demonstrate improved gait pattern as evidenced by good gait pattern with boot on with/without boot on Left Lower Extremity.  Pt will maintain pain levels < or = to 3-4/10 to help promote appropriate progression of PT, HEP, weight bearing and ADL's    Pt will be compliant with new home exercise program to assist with PT intervention and help allow appropriate progression through plan of care.     Long-Term: 12 weeks (3/17/2023)   progressing  Pt will improve left ankle AROM to WFL-WNL to allow return to normal activities of daily living.  Pt will improve left ankle strength to > or = 5/5 to allow performance of activities of daily living.  Pt will improve left knee strength to > or = 5/5 to allow performance of activities of daily living.  Pt will improve bilateral hip strength to > or = 5-/5 to allow performance of activities of daily living.  Pt will display good reciprocating gait pattern without AD to improve QOL and allow return to prior functional status.  Pt will report < or = 2/10 pain during activities of daily living to improve QOL and allow return to prior functional status.  Pt will perform single limb balance on stable surface with appropriate balance reactions for > or = 30 seconds to allow safe return to prior level of function.  Pt will be able to negotiate walking stairs and on uneven surfaces with normal shoes, without AD with good balance and gait pattern.   Pt will be compliant and independent with HEP to allow and maintain better participation in normal activities  Pt will be able to resume normals ADL's, IADL's, previous activities    PLAN     Continue per POC, progressing as appropriate to achieve stated goals.    Continue with: Plan of care Certification: 12/19/2022 to 3/17/2023.        Outpatient Physical Therapy 2 times  weekly for 12 weeks to include the following interventions: Electrical Stimulation attended/unattended, Gait Training, Manual Therapy, Moist Heat/ Ice, Neuromuscular Re-ed, Orthotic Management and Training, Patient Education, Therapeutic Activities, Therapeutic Exercise, Ultrasound, and dry needling.     Lyle Barajas, PT

## 2023-02-15 ENCOUNTER — CLINICAL SUPPORT (OUTPATIENT)
Dept: REHABILITATION | Facility: HOSPITAL | Age: 73
End: 2023-02-15
Payer: MEDICARE

## 2023-02-15 DIAGNOSIS — R26.89 IMPAIRMENT OF BALANCE: ICD-10-CM

## 2023-02-15 DIAGNOSIS — R26.9 ALTERED GAIT: ICD-10-CM

## 2023-02-15 DIAGNOSIS — R29.898 WEAKNESS OF LEFT LOWER EXTREMITY: ICD-10-CM

## 2023-02-15 DIAGNOSIS — M25.572 ACUTE LEFT ANKLE PAIN: Primary | ICD-10-CM

## 2023-02-15 DIAGNOSIS — M25.672 STIFFNESS OF LEFT ANKLE JOINT: ICD-10-CM

## 2023-02-15 DIAGNOSIS — M25.675 JOINT STIFFNESS OF LEFT FOOT: ICD-10-CM

## 2023-02-15 PROCEDURE — 97110 THERAPEUTIC EXERCISES: CPT | Mod: PN

## 2023-02-20 ENCOUNTER — CLINICAL SUPPORT (OUTPATIENT)
Dept: PULMONOLOGY | Facility: CLINIC | Age: 73
End: 2023-02-20
Payer: MEDICARE

## 2023-02-20 ENCOUNTER — ANESTHESIA EVENT (OUTPATIENT)
Dept: SURGERY | Facility: HOSPITAL | Age: 73
End: 2023-02-20
Payer: MEDICARE

## 2023-02-20 DIAGNOSIS — J45.30 MILD PERSISTENT ASTHMA WITHOUT COMPLICATION: ICD-10-CM

## 2023-02-20 LAB
BRPFT: NORMAL
FEF 25 75 CHG: 50 %
FEF 25 75 LLN: 0.78
FEF 25 75 POST REF: 26.3 %
FEF 25 75 PRE REF: 17.5 %
FEF 25 75 REF: 1.74
FET100 CHG: -21.9 %
FEV1 CHG: 23.5 %
FEV1 FVC CHG: 15.5 %
FEV1 FVC LLN: 64
FEV1 FVC POST REF: 73.4 %
FEV1 FVC PRE REF: 63.5 %
FEV1 FVC REF: 78
FEV1 LLN: 1.48
FEV1 POST REF: 54.2 %
FEV1 PRE REF: 43.9 %
FEV1 REF: 2.06
FVC CHG: 6.9 %
FVC LLN: 1.92
FVC POST REF: 73.2 %
FVC PRE REF: 68.5 %
FVC REF: 2.66
PEF CHG: 24.2 %
PEF LLN: 3.68
PEF POST REF: 74.4 %
PEF PRE REF: 59.9 %
PEF REF: 5.34
POST FEF 25 75: 0.46 L/S
POST FET 100: 10.15 SEC
POST FEV1 FVC: 57.18 %
POST FEV1: 1.11 L
POST FVC: 1.95 L
POST PEF: 3.97 L/S
PRE FEF 25 75: 0.3 L/S
PRE FET 100: 13 SEC
PRE FEV1 FVC: 49.51 %
PRE FEV1: 0.9 L
PRE FVC: 1.82 L
PRE PEF: 3.2 L/S

## 2023-02-20 PROCEDURE — 94060 PR EVAL OF BRONCHOSPASM: ICD-10-PCS | Mod: 26,S$PBB,, | Performed by: INTERNAL MEDICINE

## 2023-02-20 PROCEDURE — 94060 EVALUATION OF WHEEZING: CPT | Mod: PBBFAC

## 2023-02-20 PROCEDURE — 94060 EVALUATION OF WHEEZING: CPT | Mod: 26,S$PBB,, | Performed by: INTERNAL MEDICINE

## 2023-02-20 PROCEDURE — 95012 NITRIC OXIDE EXP GAS DETER: CPT | Mod: PBBFAC

## 2023-02-20 NOTE — PROCEDURES
Clinical Guide to Interpretation or FeNO Levels :    FeNO  (ppb) LOW INTERMEDIATE HIGH   ADULT VALUES < 25 25-50          > 50   Th2-driven Inflammation Unlikely Likely Significant     Patients FeNO level at this visit : ___14_ (ppb)    Interpretation of FeNO measurement in adults:  [x] FENO is less than 25 ppb implies non eosinophilic airway inflammation or the absence of airway inflammation.    Comment: Low FENO (<25 ppb) in adult asthmatics with persistent symptoms suggests other etiologies for these symptoms and a lower likelihood of benefit from adding or increasing inhaled glucocorticoids.    [] FENO between 25 and 50 ppb in adults should be interpreted cautiously with reference to the clinical situation (eg, symptomatic, on or off therapy, current smoking).    [] FENO greater than 50 ppb in adults  suggests eosinophilic airway inflammation   Comment: High FENO (>50 ppb) in adult asthmatics even with atypical symptoms suggests glucocorticoid responsiveness. High FENO (>50 ppb) can help identify poor adherence or uncontrolled inflammation in asthma patients with otherwise seemingly controlled asthma.    Discussion:  A FENO less than 25 ppb in adults and less than 20 ppb in children younger than 12 years of age implies noneosinophilic airway inflammation or the absence of airway inflammation.  A FENO greater than 50 ppb in adults or greater than 35 ppb in children suggests eosinophilic airway inflammation.   Values of FENO between 25 and 50 ppb in adults (20 to 35 ppb in children) should be interpreted cautiously with reference to the clinical situation (eg, symptomatic, on or off therapy, current smoking).  A rising FENO with a greater than 20 percent change and more than 25 ppb (20 ppb in children) from a previously stable level suggests increasing eosinophilic airway inflammation, but there are wide inter-individual differences.  A decrease in FENO greater than 20 percent for values over 50 ppb or more than  10 ppb for values less than 50 ppb may be clinically important.  ?FENO in other respiratory diseases - Several other diseases are associated with altered levels of exhaled NO: low levels of FENO have been noted in cystic fibrosis, current smoking, pulmonary hypertension, hypothermia, primary ciliary dyskinesia, and bronchopulmonary dysplasia. Elevated FENO has been noted in atopy, nonasthmatic eosinophilic bronchitis, COPD exacerbations, noncystic fibrosis bronchiectasis, and viral upper respiratory infections.    REFERENCE:  ATS Board of Directors, December 2004, and by the ERS Executive Committee, June 2004. ATS/ERS Recommendations for Standardized Procedures for the Online and Offline Measurement of Exhaled Lower Respiratory Nitric Oxide and Nasal Nitric Oxide. Guideline 2005

## 2023-02-22 ENCOUNTER — ANESTHESIA (OUTPATIENT)
Dept: SURGERY | Facility: HOSPITAL | Age: 73
End: 2023-02-22
Payer: MEDICARE

## 2023-02-22 ENCOUNTER — HOSPITAL ENCOUNTER (OUTPATIENT)
Dept: RADIOLOGY | Facility: HOSPITAL | Age: 73
Discharge: HOME | End: 2023-02-22
Attending: ORTHOPAEDIC SURGERY | Admitting: ORTHOPAEDIC SURGERY
Payer: MEDICARE

## 2023-02-22 ENCOUNTER — HOSPITAL ENCOUNTER (OUTPATIENT)
Facility: HOSPITAL | Age: 73
Discharge: HOME | End: 2023-02-22
Attending: ORTHOPAEDIC SURGERY | Admitting: ORTHOPAEDIC SURGERY
Payer: MEDICARE

## 2023-02-22 DIAGNOSIS — S82.842A ANKLE FRACTURE, BIMALLEOLAR, CLOSED, LEFT, INITIAL ENCOUNTER: Primary | ICD-10-CM

## 2023-02-22 DIAGNOSIS — T84.498A FAILED ORTHOPEDIC IMPLANT: ICD-10-CM

## 2023-02-22 DIAGNOSIS — T84.498A MECHANICAL COMPLICATION OF INTERNAL ORTHOPEDIC IMPLANT, INITIAL ENCOUNTER: ICD-10-CM

## 2023-02-22 DIAGNOSIS — M25.572 LEFT ANKLE PAIN: ICD-10-CM

## 2023-02-22 PROCEDURE — 63600175 PHARM REV CODE 636 W HCPCS: Mod: PO | Performed by: NURSE ANESTHETIST, CERTIFIED REGISTERED

## 2023-02-22 PROCEDURE — D9220A PRA ANESTHESIA: ICD-10-PCS | Mod: ANES,,, | Performed by: ANESTHESIOLOGY

## 2023-02-22 PROCEDURE — D9220A PRA ANESTHESIA: ICD-10-PCS | Mod: CRNA,,, | Performed by: NURSE ANESTHETIST, CERTIFIED REGISTERED

## 2023-02-22 PROCEDURE — 37000009 HC ANESTHESIA EA ADD 15 MINS: Mod: PO | Performed by: ORTHOPAEDIC SURGERY

## 2023-02-22 PROCEDURE — 20680 PR REMOVAL DEEP IMPLANT: ICD-10-PCS | Mod: LT,,, | Performed by: ORTHOPAEDIC SURGERY

## 2023-02-22 PROCEDURE — 25000003 PHARM REV CODE 250: Mod: PO | Performed by: ORTHOPAEDIC SURGERY

## 2023-02-22 PROCEDURE — 37000008 HC ANESTHESIA 1ST 15 MINUTES: Mod: PO | Performed by: ORTHOPAEDIC SURGERY

## 2023-02-22 PROCEDURE — D9220A PRA ANESTHESIA: Mod: ANES,,, | Performed by: ANESTHESIOLOGY

## 2023-02-22 PROCEDURE — 27200651 HC AIRWAY, LMA: Mod: PO | Performed by: ANESTHESIOLOGY

## 2023-02-22 PROCEDURE — 63600175 PHARM REV CODE 636 W HCPCS: Mod: PO | Performed by: ANESTHESIOLOGY

## 2023-02-22 PROCEDURE — 71000015 HC POSTOP RECOV 1ST HR: Mod: PO | Performed by: ORTHOPAEDIC SURGERY

## 2023-02-22 PROCEDURE — 36000706: Mod: PO | Performed by: ORTHOPAEDIC SURGERY

## 2023-02-22 PROCEDURE — 71000033 HC RECOVERY, INTIAL HOUR: Mod: PO | Performed by: ORTHOPAEDIC SURGERY

## 2023-02-22 PROCEDURE — 76000 FLUOROSCOPY <1 HR PHYS/QHP: CPT | Mod: TC,PO

## 2023-02-22 PROCEDURE — 25000003 PHARM REV CODE 250: Mod: PO | Performed by: NURSE ANESTHETIST, CERTIFIED REGISTERED

## 2023-02-22 PROCEDURE — 20680 REMOVAL OF IMPLANT DEEP: CPT | Mod: LT,,, | Performed by: ORTHOPAEDIC SURGERY

## 2023-02-22 PROCEDURE — 25000003 PHARM REV CODE 250: Mod: PO | Performed by: ANESTHESIOLOGY

## 2023-02-22 PROCEDURE — 36000707: Mod: PO | Performed by: ORTHOPAEDIC SURGERY

## 2023-02-22 PROCEDURE — D9220A PRA ANESTHESIA: Mod: CRNA,,, | Performed by: NURSE ANESTHETIST, CERTIFIED REGISTERED

## 2023-02-22 RX ORDER — OXYCODONE AND ACETAMINOPHEN 5; 325 MG/1; MG/1
1 TABLET ORAL EVERY 6 HOURS PRN
Qty: 28 TABLET | Refills: 0 | Status: SHIPPED | OUTPATIENT
Start: 2023-02-22 | End: 2023-03-01

## 2023-02-22 RX ORDER — DIPHENHYDRAMINE HYDROCHLORIDE 50 MG/ML
12.5 INJECTION INTRAMUSCULAR; INTRAVENOUS EVERY 6 HOURS PRN
Status: DISCONTINUED | OUTPATIENT
Start: 2023-02-22 | End: 2023-02-22 | Stop reason: HOSPADM

## 2023-02-22 RX ORDER — MEPERIDINE HYDROCHLORIDE 50 MG/ML
12.5 INJECTION INTRAMUSCULAR; INTRAVENOUS; SUBCUTANEOUS ONCE
Status: DISCONTINUED | OUTPATIENT
Start: 2023-02-22 | End: 2023-02-22 | Stop reason: HOSPADM

## 2023-02-22 RX ORDER — LIDOCAINE HYDROCHLORIDE 10 MG/ML
1 INJECTION, SOLUTION EPIDURAL; INFILTRATION; INTRACAUDAL; PERINEURAL ONCE
Status: COMPLETED | OUTPATIENT
Start: 2023-02-22 | End: 2023-02-22

## 2023-02-22 RX ORDER — FENTANYL CITRATE 50 UG/ML
25 INJECTION, SOLUTION INTRAMUSCULAR; INTRAVENOUS EVERY 5 MIN PRN
Status: DISCONTINUED | OUTPATIENT
Start: 2023-02-22 | End: 2023-02-22 | Stop reason: HOSPADM

## 2023-02-22 RX ORDER — HYDROMORPHONE HYDROCHLORIDE 2 MG/ML
0.2 INJECTION, SOLUTION INTRAMUSCULAR; INTRAVENOUS; SUBCUTANEOUS EVERY 5 MIN PRN
Status: DISCONTINUED | OUTPATIENT
Start: 2023-02-22 | End: 2023-02-22 | Stop reason: HOSPADM

## 2023-02-22 RX ORDER — OXYCODONE HYDROCHLORIDE 5 MG/1
5 TABLET ORAL
Status: DISCONTINUED | OUTPATIENT
Start: 2023-02-22 | End: 2023-02-22 | Stop reason: HOSPADM

## 2023-02-22 RX ORDER — LIDOCAINE HCL/PF 100 MG/5ML
SYRINGE (ML) INTRAVENOUS
Status: DISCONTINUED | OUTPATIENT
Start: 2023-02-22 | End: 2023-02-22

## 2023-02-22 RX ORDER — ACETAMINOPHEN 10 MG/ML
INJECTION, SOLUTION INTRAVENOUS
Status: DISCONTINUED | OUTPATIENT
Start: 2023-02-22 | End: 2023-02-22

## 2023-02-22 RX ORDER — CLINDAMYCIN PHOSPHATE 600 MG/50ML
600 INJECTION, SOLUTION INTRAVENOUS
Status: COMPLETED | OUTPATIENT
Start: 2023-02-22 | End: 2023-02-22

## 2023-02-22 RX ORDER — PHENYLEPHRINE HYDROCHLORIDE 10 MG/ML
INJECTION INTRAVENOUS
Status: DISCONTINUED | OUTPATIENT
Start: 2023-02-22 | End: 2023-02-22

## 2023-02-22 RX ORDER — MIDAZOLAM HYDROCHLORIDE 1 MG/ML
INJECTION INTRAMUSCULAR; INTRAVENOUS
Status: DISCONTINUED | OUTPATIENT
Start: 2023-02-22 | End: 2023-02-22

## 2023-02-22 RX ORDER — BUPIVACAINE HYDROCHLORIDE AND EPINEPHRINE 5; 5 MG/ML; UG/ML
INJECTION, SOLUTION EPIDURAL; INTRACAUDAL; PERINEURAL
Status: DISCONTINUED | OUTPATIENT
Start: 2023-02-22 | End: 2023-02-22 | Stop reason: HOSPADM

## 2023-02-22 RX ORDER — SODIUM CHLORIDE, SODIUM LACTATE, POTASSIUM CHLORIDE, CALCIUM CHLORIDE 600; 310; 30; 20 MG/100ML; MG/100ML; MG/100ML; MG/100ML
INJECTION, SOLUTION INTRAVENOUS CONTINUOUS
Status: DISCONTINUED | OUTPATIENT
Start: 2023-02-22 | End: 2023-02-22 | Stop reason: HOSPADM

## 2023-02-22 RX ORDER — ONDANSETRON 2 MG/ML
INJECTION INTRAMUSCULAR; INTRAVENOUS
Status: DISCONTINUED | OUTPATIENT
Start: 2023-02-22 | End: 2023-02-22

## 2023-02-22 RX ORDER — PROPOFOL 10 MG/ML
VIAL (ML) INTRAVENOUS
Status: DISCONTINUED | OUTPATIENT
Start: 2023-02-22 | End: 2023-02-22

## 2023-02-22 RX ORDER — FENTANYL CITRATE 50 UG/ML
INJECTION, SOLUTION INTRAMUSCULAR; INTRAVENOUS
Status: DISCONTINUED | OUTPATIENT
Start: 2023-02-22 | End: 2023-02-22

## 2023-02-22 RX ADMIN — PHENYLEPHRINE HYDROCHLORIDE 200 MCG: 10 INJECTION INTRAVENOUS at 07:02

## 2023-02-22 RX ADMIN — MIDAZOLAM HYDROCHLORIDE 1 MG: 1 INJECTION, SOLUTION INTRAMUSCULAR; INTRAVENOUS at 06:02

## 2023-02-22 RX ADMIN — SODIUM CHLORIDE, POTASSIUM CHLORIDE, SODIUM LACTATE AND CALCIUM CHLORIDE: 600; 310; 30; 20 INJECTION, SOLUTION INTRAVENOUS at 06:02

## 2023-02-22 RX ADMIN — LIDOCAINE HYDROCHLORIDE 50 MG: 20 INJECTION, SOLUTION INTRAVENOUS at 07:02

## 2023-02-22 RX ADMIN — FENTANYL CITRATE 25 MCG: 50 INJECTION, SOLUTION INTRAMUSCULAR; INTRAVENOUS at 07:02

## 2023-02-22 RX ADMIN — PHENYLEPHRINE HYDROCHLORIDE 100 MCG: 10 INJECTION INTRAVENOUS at 07:02

## 2023-02-22 RX ADMIN — LIDOCAINE HYDROCHLORIDE 10 MG: 10 INJECTION, SOLUTION EPIDURAL; INFILTRATION; INTRACAUDAL; PERINEURAL at 06:02

## 2023-02-22 RX ADMIN — CLINDAMYCIN IN 5 PERCENT DEXTROSE 600 MG: 12 INJECTION, SOLUTION INTRAVENOUS at 06:02

## 2023-02-22 RX ADMIN — ONDANSETRON 4 MG: 2 INJECTION, SOLUTION INTRAMUSCULAR; INTRAVENOUS at 07:02

## 2023-02-22 RX ADMIN — ACETAMINOPHEN 1000 MG: 10 INJECTION, SOLUTION INTRAVENOUS at 07:02

## 2023-02-22 RX ADMIN — PROPOFOL 120 MG: 10 INJECTION, EMULSION INTRAVENOUS at 07:02

## 2023-02-22 NOTE — ANESTHESIA PREPROCEDURE EVALUATION
02/22/2023  Cindy Ramirez is a 72 y.o., female.      Pre-op Assessment    I have reviewed the Patient Summary Reports.     I have reviewed the Nursing Notes. I have reviewed the NPO Status.   I have reviewed the Medications.     Review of Systems  Anesthesia Hx:  No problems with previous Anesthesia    Social:  Non-Smoker    Hematology/Oncology:         -- Cancer in past history ([facility]):   Cardiovascular:   Hypertension, well controlled hyperlipidemia    Pulmonary:   Asthma asymptomatic and mild    Renal/:  Renal/ Normal     Musculoskeletal:   Arthritis  Cervical fusion   Neurological:  Neurology Normal Unilateral facial paralysis   Endocrine:  Endocrine Normal    Psych:   Psychiatric History depression          Physical Exam  General: Well nourished, Cooperative, Alert and Oriented    Airway:  Mallampati: II   Mouth Opening: Normal  TM Distance: Normal  Neck ROM: Normal ROM        Anesthesia Plan  Type of Anesthesia, risks & benefits discussed:    Anesthesia Type: Gen Supraglottic Airway  Intra-op Monitoring Plan: Standard ASA Monitors  Post Op Pain Control Plan: multimodal analgesia and IV/PO Opioids PRN  Induction:  IV  Airway Plan: Video, Post-Induction  Informed Consent: Informed consent signed with the Patient and all parties understand the risks and agree with anesthesia plan.  All questions answered.   ASA Score: 2  Day of Surgery Review of History & Physical: H&P Update referred to the surgeon/provider.    Ready For Surgery From Anesthesia Perspective.     .

## 2023-02-22 NOTE — BRIEF OP NOTE
[facility] - Surgery  Brief Operative Note    SUMMARY     Surgery Date: 2/22/2023     Surgeon(s) and Role:     * Allen Mauricio MD - Primary    Assisting Surgeon: None    Pre-op Diagnosis:  Mechanical complication of internal orthopedic implant, initial encounter [T84.498A]    Post-op Diagnosis: Post-Op Diagnosis Codes:     * Mechanical complication of internal orthopedic implant, initial encounter [T84.498A]      Procedure(s) (LRB):  REMOVAL, HARDWARE (Left)      Operative Findings: Intact trans-syndesmotic screw.    Estimated Blood Loss: * No values recorded between 2/22/2023  7:16 AM and 2/22/2023  7:34 AM *         Specimens:   Specimen (24h ago, onward)      None

## 2023-02-22 NOTE — TRANSFER OF CARE
"Anesthesia Transfer of Care Note    Patient: Cindy Ramirez    Procedure(s) Performed: Procedure(s) (LRB):  REMOVAL, HARDWARE (Left)    Patient location: PACU    Anesthesia Type: general    Transport from OR: Transported from OR on room air with adequate spontaneous ventilation    Post pain: adequate analgesia    Post assessment: no apparent anesthetic complications and tolerated procedure well    Post vital signs: stable    Level of consciousness: awake and sedated    Nausea/Vomiting: no nausea/vomiting    Complications: none    Transfer of care protocol was followed      Last vitals:   Visit Vitals  /65 (BP Location: Right arm, Patient Position: Lying)   Pulse 69   Temp 36.9 °C (98.4 °F) (Skin)   Resp 17   Ht 5' 3" (1.6 m)   Wt 54.4 kg (120 lb)   SpO2 (!) 94%   Breastfeeding No   BMI 21.26 kg/m²     "

## 2023-02-22 NOTE — OP NOTE
Date of Surgery: 02/22/2023    Pre-Operative Diagnosis:  Retained deep orthopedic implant, Left ankle.    Post-Operative Diagnosis:  Retained deep orthopedic implant, Left ankle.    Procedures:  Removal of Left ankle deep orthopedic implant. [id number].    Surgeon: Allen Mauricio MD    Assist: CRISTY Gamez    Anesthesia: General with local anesthetic.    Estimated Blood Loss: <5mL.    Drains: None.    Findings: No residual clear space widening with external rotation stress after trans-syndesmotic screw removal.    Implants: None.    Operative Indication:  Cindy Ramirez is a 72 y.o. female previously underwent left ankle fracture ORIF in October 2022.  Patient has done well postoperatively however now with retained symptomatic deep orthopedic implant.    Operative versus non operative treatment options were discussed.  Risks and benefits were described.  These include but are not limited to bleeding; infection; damage to surrounding nerves or vessels; persistent pain, stiffness; need for additional procedures; amputation; blood clots; pulmonary embolus; cardiac events; stroke; and the general risks of anesthesia including anesthetic death.   We also reviewed postop protocol as well as limitations and expectations. Patient understands and desires to proceed with surgical intervention. Consent was obtained and the left lower leg was marked.    Operative Procedure:  The patient was transferred to the operating room, transferred to the OR table in a supine position then placed under general endotracheal anesthesia by the anesthesiology service. All bony prominences were appropriately padded.  Patient was secured to the table.  An SCD was placed on the contralateral lower limb.  The patient was prepped and draped in usual sterile fashion.  A time-out was then called.  The patient, procedure, surgical site was verified and everyone was in agreement.  Preoperative IV antibiotics were administered.  An Esmarch was  used to exsanguinate the left lower extremity and used as a tourniquet around the left calf.  After localizing the syndesmotic screw using fluoroscopy, an approximately 1.5 cm incision was made using a #15 Blade.  Dissection was taken down deep to the level of the plate and planned screw removal.  After clearing out all residual scar tissue at this level, the screw was removed without complication.  The wound was then copiously irrigated and closed in standard layered fashion including 2-0 Vicryl deep, 3-0 Monocryl subcuticularly, and 3-0 nylon on the skin.  Sterile dressings including Xeroform, 4 x 4 gauze, Webril, and ACE bandage were placed before placing the patient into a tall CAM boot. Patient was reversed from anesthesia and transferred to recovery in stable condition.    Allen Mauricio MD

## 2023-02-22 NOTE — DISCHARGE SUMMARY
[facility] - Surgery  Discharge Note  Short Stay    Procedure(s) (LRB):  REMOVAL, HARDWARE (Left)      OUTCOME: Patient tolerated treatment/procedure well without complication and is now ready for discharge.    DISPOSITION: Home or Self Care    FINAL DIAGNOSIS:  <principal problem not specified>    FOLLOWUP: In clinic    DISCHARGE INSTRUCTIONS:  No discharge procedures on file.     TIME SPENT ON DISCHARGE: 15 minutes

## 2023-02-22 NOTE — ANESTHESIA PROCEDURE NOTES
Intubation    Date/Time: 2/22/2023 7:03 AM  Performed by: Mary Ellen Patton CRNA  Authorized by: Farheen Gregory MD     Intubation:     Induction:  Intravenous    Intubated:  Postinduction    Mask Ventilation:  Easy mask    Attempts:  1    Attempted By:  CRNA    Method of Intubation:  Other (see comments)    Difficult Airway Encountered?: No      Complications:  None    Airway Device:  Supraglottic airway/LMA    Airway Device Size:  4.0    Style/Cuff Inflation:  Cuffed    Inflation Amount (mL):  5    Secured at:  The lips    Placement Verified By:  Capnometry    Complicating Factors:  None

## 2023-02-23 VITALS
HEIGHT: 63 IN | RESPIRATION RATE: 15 BRPM | TEMPERATURE: 98 F | DIASTOLIC BLOOD PRESSURE: 61 MMHG | WEIGHT: 120 LBS | SYSTOLIC BLOOD PRESSURE: 128 MMHG | OXYGEN SATURATION: 98 % | HEART RATE: 69 BPM | BODY MASS INDEX: 21.26 KG/M2

## 2023-02-23 NOTE — ANESTHESIA POSTPROCEDURE EVALUATION
Anesthesia Post Evaluation    Patient: Cindy Ramirez    Procedure(s) Performed: Procedure(s) (LRB):  REMOVAL, HARDWARE (Left)    Final Anesthesia Type: general      Patient location during evaluation: PACU  Patient participation: Yes- Able to Participate  Level of consciousness: awake and alert  Post-procedure vital signs: reviewed and stable  Pain management: adequate  Airway patency: patent    PONV status at discharge: No PONV  Anesthetic complications: no      Cardiovascular status: blood pressure returned to baseline  Respiratory status: unassisted and room air  Hydration status: euvolemic  Follow-up not needed.          Vitals Value Taken Time   /61 02/22/23 0759   Temp 36.7 °C (98 °F) 02/22/23 0735   Pulse 69 02/22/23 0759   Resp 15 02/22/23 0759   SpO2 98 % 02/22/23 0759         Event Time   Out of Recovery 07:36:00         Pain/Joselin Score: Joselin Score: 10 (2/22/2023  7:49 AM)

## 2023-03-02 ENCOUNTER — OFFICE VISIT (OUTPATIENT)
Dept: PULMONOLOGY | Facility: CLINIC | Age: 73
End: 2023-03-02
Payer: MEDICARE

## 2023-03-02 VITALS
DIASTOLIC BLOOD PRESSURE: 70 MMHG | SYSTOLIC BLOOD PRESSURE: 123 MMHG | WEIGHT: 122.25 LBS | HEIGHT: 63 IN | HEART RATE: 68 BPM | OXYGEN SATURATION: 96 % | BODY MASS INDEX: 21.66 KG/M2

## 2023-03-02 DIAGNOSIS — J45.30 MILD PERSISTENT ASTHMA WITHOUT COMPLICATION: Primary | ICD-10-CM

## 2023-03-02 DIAGNOSIS — J44.89 COPD WITH ASTHMA: ICD-10-CM

## 2023-03-02 PROCEDURE — 99999 PR PBB SHADOW E&M-EST. PATIENT-LVL III: ICD-10-PCS | Mod: PBBFAC,,, | Performed by: INTERNAL MEDICINE

## 2023-03-02 PROCEDURE — 99214 PR OFFICE/OUTPT VISIT, EST, LEVL IV, 30-39 MIN: ICD-10-PCS | Mod: S$PBB,,, | Performed by: INTERNAL MEDICINE

## 2023-03-02 PROCEDURE — 99213 OFFICE O/P EST LOW 20 MIN: CPT | Mod: PBBFAC,PO | Performed by: INTERNAL MEDICINE

## 2023-03-02 PROCEDURE — 99214 OFFICE O/P EST MOD 30 MIN: CPT | Mod: S$PBB,,, | Performed by: INTERNAL MEDICINE

## 2023-03-02 PROCEDURE — 99999 PR PBB SHADOW E&M-EST. PATIENT-LVL III: CPT | Mod: PBBFAC,,, | Performed by: INTERNAL MEDICINE

## 2023-03-02 RX ORDER — FLUTICASONE FUROATE, UMECLIDINIUM BROMIDE AND VILANTEROL TRIFENATATE 200; 62.5; 25 UG/1; UG/1; UG/1
1 POWDER RESPIRATORY (INHALATION) DAILY
Qty: 60 EACH | Refills: 11 | Status: SHIPPED | OUTPATIENT
Start: 2023-03-02

## 2023-03-02 NOTE — PATIENT INSTRUCTIONS
Moderate/severe COPD seen on breathing test- this is due to longstanding asthma- tightness of bronchial tubes making it difficult to breathe, may be prone to mucous, cough and wheezing.   Prevention of further loss of lung function is the goal. Prevention flare ups is important to avoid worsening COPD.  PCV 20 vaccine recommended- would recommend get at a pharmacy or PCP office  Start trelegy inhaler one puff daily- rinse mouth after use  Continue albuterol as needed  Stop flovent inhaler  Exercise recommended as able to maintain strength and lung function.

## 2023-03-02 NOTE — PROGRESS NOTES
3/2/2023    Cindy Ramirez  New Patient Consult    Chief Complaint   Patient presents with    Cough       HPI: Pt is a 71 yo female with asthma, HTN, HLD presenting for new evaluation. She has been seen by previous PA with pulmonology but is new to me.  Pt has had a series of severe exacerbations since 2020- twice requiring steroid/abx. Never required hospitalization or intubation for asthma exac.  Inhalers: only uses when she feels she needs them- flovent, albuterol  Not using flovent lately as instructions were not clear. Has needed albuterol every other day.  She perceives covid vaccine triggered an exacerbation in past. Her last exacerbation was fall 2022, was in [place], got steroid/abx then had to get more steroid/abx when she got home.  Pt never smoked but has had lifelong asthma.  Triggers: house dust  Denies nighttime wakings w/ asthma. Denies rashes, nasal allergies. Has had sinus infection once last yr. Denies heartburn/reflux.  Pt used to be a runner but not for the past 20 yr.  She is in a heel boot after 2nd L ankle surgery for fracture. She is not very active but doesn't notice respiratory limitations.  FH son and 2 maternal uncles have asthma.  Denies secondhand smoke exposure or asbestos exposure.      The chief complaint problem is new to me.    PFSH:  Past Medical History:   Diagnosis Date    Arthritis     Asthma     Basal cell carcinoma 2014    right cheek    Cataract     Hyperlipidemia     Hypertension     Insomnia          Past Surgical History:   Procedure Laterality Date    BACK SURGERY      CATARACT EXTRACTION W/  INTRAOCULAR LENS IMPLANT Right 08/22/2019    +12.5    COLONOSCOPY N/A 1/8/2020    Procedure: COLONOSCOPY;  Surgeon: Jameson Tovar MD;  Location: [facility] ENDO;  Service: Endoscopy;  Laterality: N/A;    EYE SURGERY      cataract ou    FIXATION OF SYNDESMOSIS OF ANKLE Left 10/19/2022    Procedure: FIXATION, SYNDESMOSIS, ANKLE;  Surgeon: Allen Mauricio MD;  Location: [facility] OR;   "Service: Orthopedics;  Laterality: Left;    HARDWARE REMOVAL Left 2/22/2023    Procedure: REMOVAL, HARDWARE;  Surgeon: Allen Mauricio MD;  Location: [facility] OR;  Service: Orthopedics;  Laterality: Left;  ankle    NECK SURGERY  2000    corpectomy    OPEN REDUCTION AND INTERNAL FIXATION (ORIF) OF INJURY OF ANKLE Left 10/19/2022    Procedure: ORIF, ANKLE;  Surgeon: Allen Mauricio MD;  Location: [facility] OR;  Service: Orthopedics;  Laterality: Left;    SPINAL FUSION      TONSILLECTOMY      tonsils       Social History     Tobacco Use    Smoking status: Never     Passive exposure: Never    Smokeless tobacco: Never   Substance Use Topics    Alcohol use: Yes     Alcohol/week: 7.0 standard drinks     Types: 7 Glasses of wine per week    Drug use: No     Family History   Problem Relation Age of Onset    Hypertension Mother     Macular degeneration Mother     Hypertension Father     Cancer Father         prostate    Pancreatic cancer Maternal Grandmother     Heart failure Maternal Grandfather     Dementia Paternal Grandmother     Breast cancer Maternal Aunt      Review of patient's allergies indicates:   Allergen Reactions    Penicillins      Other reaction(s): Unknown       Performance Status:The patient's activity level is functions out of house.      Review of Systems:  a review of eleven systems covering constitutional, Eye, HEENT, Psych, Respiratory, Cardiac, GI, , Musculoskeletal, Endocrine, Dermatologic was negative except for pertinent findings as listed ABOVE and below:  All negative with pertinent positives as above        Exam:Comprehensive exam done. /70 (BP Location: Left arm, Patient Position: Sitting, BP Method: Medium (Automatic))   Pulse 68   Ht 5' 3" (1.6 m)   Wt 55.4 kg (122 lb 3.9 oz)   SpO2 96%   BMI 21.65 kg/m²   Exam included Vitals as listed, and patient's appearance and affect and alertness and mood, oral exam for yeast and hygiene and pharynx lesions and Mallapatti (M) score, neck with " inspection for jvd and masses and thyroid abnormalities and lymph nodes (supraclavicular and infraclavicular nodes and axillary also examined and noted if abn), chest exam included symmetry and effort and fremitus and percussion and auscultation, cardiac exam included rhythm and gallops and murmur and rubs and jvd and edema, abdominal exam for mass and hepatosplenomegaly and tenderness and hernias and bowel sounds, Musculoskeletal exam with muscle tone and posture and mobility/gait and  strength, and skin for rashes and cyanosis and pallor and turgor, extremity for clubbing.  Findings were normal except for pertinent findings listed below:  M1, oropharynx clear  R facial droop- pt states born with it  Clear breath sounds bilaterally  HR regular  No edema/clubbing  L ankle boot    Radiographs (ct chest and cxr) reviewed: view by direct vision   CXR 1/3/23- hyperinflation    Labs reviewed     Latest Reference Range & Units 03/11/16 09:10 06/24/19 08:35 02/09/21 07:18 03/28/22 09:05   Eos # 0.0 - 0.5 K/uL 0.2 0.2 0.2 0.1      Latest Reference Range & Units 01/03/23 09:58   IgE 0 - 100 IU/mL 69     FeNO 2/21/23 Patients FeNO level at this visit : ___14_ (ppb)     PFT results reviewed  2/20/23-         Plan:  Clinical impression is reasonably certain and repeated evaluation prn +/- follow up will be needed as below. Non-TH2 asthma with airway remodeling and COPD, has had severe exacerbations recently- presenting to establish care.    Cindy was seen today for cough.    Diagnoses and all orders for this visit:    Mild persistent asthma without complication  -     fluticasone-umeclidin-vilanter (TRELEGY ELLIPTA) 200-62.5-25 mcg inhaler; Inhale 1 puff into the lungs once daily.    COPD with asthma        Follow up in about 6 months (around 9/2/2023).    Discussed with patient above for education the following:      Patient Instructions   Moderate/severe COPD seen on breathing test- this is due to longstanding asthma-  tightness of bronchial tubes making it difficult to breathe, may be prone to mucous, cough and wheezing.   Prevention of further loss of lung function is the goal. Prevention flare ups is important to avoid worsening COPD.  PCV 20 vaccine recommended- would recommend get at a pharmacy or PCP office  Start trelegy inhaler one puff daily- rinse mouth after use  Continue albuterol as needed  Stop flovent inhaler  Exercise recommended as able to maintain strength and lung function.

## 2023-03-07 ENCOUNTER — HOSPITAL ENCOUNTER (OUTPATIENT)
Dept: RADIOLOGY | Facility: HOSPITAL | Age: 73
Discharge: HOME | End: 2023-03-07
Attending: ORTHOPAEDIC SURGERY
Payer: MEDICARE

## 2023-03-07 ENCOUNTER — OFFICE VISIT (OUTPATIENT)
Dept: ORTHOPEDICS | Facility: CLINIC | Age: 73
End: 2023-03-07
Payer: MEDICARE

## 2023-03-07 VITALS — BODY MASS INDEX: 21.62 KG/M2 | HEIGHT: 63 IN | WEIGHT: 122 LBS

## 2023-03-07 DIAGNOSIS — S82.842A ANKLE FRACTURE, BIMALLEOLAR, CLOSED, LEFT, INITIAL ENCOUNTER: ICD-10-CM

## 2023-03-07 DIAGNOSIS — T84.498A MECHANICAL COMPLICATION OF INTERNAL ORTHOPEDIC IMPLANT, INITIAL ENCOUNTER: Primary | ICD-10-CM

## 2023-03-07 PROCEDURE — 99999 PR PBB SHADOW E&M-EST. PATIENT-LVL III: ICD-10-PCS | Mod: PBBFAC,,, | Performed by: ORTHOPAEDIC SURGERY

## 2023-03-07 PROCEDURE — 99999 PR PBB SHADOW E&M-EST. PATIENT-LVL III: CPT | Mod: PBBFAC,,, | Performed by: ORTHOPAEDIC SURGERY

## 2023-03-07 PROCEDURE — 73610 X-RAY EXAM OF ANKLE: CPT | Mod: 26,LT,, | Performed by: RADIOLOGY

## 2023-03-07 PROCEDURE — 73610 XR ANKLE COMPLETE 3 VIEW LEFT: ICD-10-PCS | Mod: 26,LT,, | Performed by: RADIOLOGY

## 2023-03-07 PROCEDURE — 99024 PR POST-OP FOLLOW-UP VISIT: ICD-10-PCS | Mod: POP,,, | Performed by: ORTHOPAEDIC SURGERY

## 2023-03-07 PROCEDURE — 99213 OFFICE O/P EST LOW 20 MIN: CPT | Mod: PBBFAC,PN | Performed by: ORTHOPAEDIC SURGERY

## 2023-03-07 PROCEDURE — 99024 POSTOP FOLLOW-UP VISIT: CPT | Mod: POP,,, | Performed by: ORTHOPAEDIC SURGERY

## 2023-03-07 PROCEDURE — 73610 X-RAY EXAM OF ANKLE: CPT | Mod: TC,PO,LT

## 2023-03-07 NOTE — PROGRESS NOTES
Status/Diagnosis: Displaced Left trimalleolar ankle fracture.  Date of Surgery: 10/19/2022 Trimal ORIF; 02/20/2023 HWR.  Date of Injury: 10/11/2022  Return visit: 2 weeks  X-rays on Return: none    Chief Complaint:   Chief Complaint   Patient presents with    Left Ankle - Post-op Evaluation    Left Foot - Post-op Evaluation     Present History:  Cindy Ramirez is a 72 y.o. female who returns today for her 1st postoperative clinic visit status post trans syndesmotic screw removal.  Overall doing well.  Has been weight-bearing as tolerated in her tall boot.  Only mild intermittent pain.  He is not required narcotic pain medication for the last 10 days.  Denies any drainage, fever, chills.      Physical exam:  There were no vitals filed for this visit.  Body mass index is 21.61 kg/m².  General: In no apparent distress; well developed and well nourished.  HEENT: normocephalic; atraumatic.  Cardiovascular: regular rate.  Respiratory: no increased work of breathing.  Musculoskeletal:   Inspection:  Surgical site healing well with nylon sutures in place.  Some degree of skin maceration along the central aspect of the surgical site measuring 3 x 5 mm.  No drainage.  No signs or symptoms of infection.  No deep extension.  Mild residual ankle swelling to be expected 2 weeks postoperatively.  Ankle range of motion from 10° dorsiflexion to 60° plantar flexion.  Sensation intact to light touch distally.  Palpable pedal pulse.                  Weightbearing three views left ankle:  Status post bimalleolar ankle fracture ORIF.  Fractures are well healed.  Interval removal of syndesmotic screw with residual lucency.  Overall alignment well-maintained. Congruent ankle mortise.  No evidence of implant loosening or failure.     Assessment:  Cindy Ramirez is a 72 y.o. female with acute displaced left trimalleolar ankle fracture.  Date of injury:  10/11/2022.  STATUS POST Trimalleolar ankle fracture and syndesmosis ORIF on  10/19/2022.  STATUS POST syndesmotic screw removal on 02/20/2023.     Plan:   Clinical and radiographic findings were discussed.  Sutures removed and Steri-Strips placed.  Patient may shower but no baths/submersion of the wound.  We will continue to watch the area of skin maceration closely.  May transition out of the boot into normal shoe wear over the next 2 weeks.  Weightbear as tolerated.  Return to clinic in 2 weeks for repeat evaluation and wound check.  No new x-rays.  Patient may call and cancel if surgical site is healed at that time.  Patient voiced understanding.  All questions were answered.      This note was created using voice recognition software and may contain grammatical errors.

## 2023-04-26 ENCOUNTER — OFFICE VISIT (OUTPATIENT)
Dept: FAMILY MEDICINE | Facility: CLINIC | Age: 73
End: 2023-04-26
Payer: MEDICARE

## 2023-04-26 VITALS
SYSTOLIC BLOOD PRESSURE: 126 MMHG | HEART RATE: 81 BPM | BODY MASS INDEX: 22.07 KG/M2 | WEIGHT: 124.56 LBS | DIASTOLIC BLOOD PRESSURE: 62 MMHG | HEIGHT: 63 IN | OXYGEN SATURATION: 98 %

## 2023-04-26 DIAGNOSIS — F32.0 CURRENT MILD EPISODE OF MAJOR DEPRESSIVE DISORDER WITHOUT PRIOR EPISODE: ICD-10-CM

## 2023-04-26 DIAGNOSIS — I10 PRIMARY HYPERTENSION: ICD-10-CM

## 2023-04-26 DIAGNOSIS — D69.2 SENILE PURPURA: ICD-10-CM

## 2023-04-26 DIAGNOSIS — J44.89 COPD WITH ASTHMA: ICD-10-CM

## 2023-04-26 DIAGNOSIS — Z12.31 ENCOUNTER FOR SCREENING MAMMOGRAM FOR MALIGNANT NEOPLASM OF BREAST: ICD-10-CM

## 2023-04-26 DIAGNOSIS — Z00.00 ENCOUNTER FOR PREVENTIVE HEALTH EXAMINATION: Primary | ICD-10-CM

## 2023-04-26 DIAGNOSIS — I10 ESSENTIAL HYPERTENSION: ICD-10-CM

## 2023-04-26 PROBLEM — S82.409A FIBULA FRACTURE: Status: RESOLVED | Noted: 2022-10-12 | Resolved: 2023-04-26

## 2023-04-26 PROBLEM — M25.572 ACUTE LEFT ANKLE PAIN: Status: RESOLVED | Noted: 2022-12-19 | Resolved: 2023-04-26

## 2023-04-26 PROCEDURE — G0439 PR MEDICARE ANNUAL WELLNESS SUBSEQUENT VISIT: ICD-10-PCS | Mod: ,,, | Performed by: NURSE PRACTITIONER

## 2023-04-26 PROCEDURE — G0439 PPPS, SUBSEQ VISIT: HCPCS | Mod: ,,, | Performed by: NURSE PRACTITIONER

## 2023-04-26 PROCEDURE — 99999 PR PBB SHADOW E&M-EST. PATIENT-LVL IV: CPT | Mod: PBBFAC,,, | Performed by: NURSE PRACTITIONER

## 2023-04-26 PROCEDURE — 99999 PR PBB SHADOW E&M-EST. PATIENT-LVL IV: ICD-10-PCS | Mod: PBBFAC,,, | Performed by: NURSE PRACTITIONER

## 2023-04-26 PROCEDURE — 99214 OFFICE O/P EST MOD 30 MIN: CPT | Mod: PBBFAC,PO | Performed by: NURSE PRACTITIONER

## 2023-04-26 RX ORDER — LISINOPRIL 30 MG/1
30 TABLET ORAL DAILY
Qty: 90 TABLET | Refills: 0 | Status: SHIPPED | OUTPATIENT
Start: 2023-04-26 | End: 2023-04-26 | Stop reason: SDUPTHER

## 2023-04-26 RX ORDER — LISINOPRIL 30 MG/1
30 TABLET ORAL DAILY
Qty: 90 TABLET | Refills: 0 | Status: SHIPPED | OUTPATIENT
Start: 2023-04-26 | End: 2023-08-07 | Stop reason: SDUPTHER

## 2023-04-26 NOTE — PROGRESS NOTES
"  Cindy Ramirez presented for a  Medicare AWV and comprehensive Health Risk Assessment today. The following components were reviewed and updated:    Medical history  Family History  Social history  Allergies and Current Medications  Health Risk Assessment  Health Maintenance  Care Team         ** See Completed Assessments for Annual Wellness Visit within the encounter summary.**         The following assessments were completed:  Living Situation  CAGE  Depression Screening  Timed Get Up and Go  Whisper Test  Cognitive Function Screening      Nutrition Screening  ADL Screening  PAQ Screening    Review for Opioid Screening: Patient does not have rx for Opioids.    Review for Substance Use Disorders: Patient does not use substance.     Vitals:    04/26/23 0714   BP: 126/62   BP Location: Left arm   Patient Position: Sitting   BP Method: Medium (Manual)   Pulse: 81   SpO2: 98%   Weight: 56.5 kg (124 lb 9 oz)   Height: 5' 3" (1.6 m)     Body mass index is 22.06 kg/m².  Physical Exam  Vitals reviewed.   HENT:      Head: Normocephalic.   Eyes:      Conjunctiva/sclera: Conjunctivae normal.   Pulmonary:      Effort: Pulmonary effort is normal. No respiratory distress.   Skin:     General: Skin is warm.   Neurological:      Mental Status: She is alert.   Psychiatric:         Mood and Affect: Mood normal.             Diagnoses and health risks identified today and associated recommendations/orders:    1. Encounter for preventive health examination  Reviewed health maintenance and provided recommendations   Recommend COVID, pcv, and shingrix   - Mammo Digital Screening Bilat w/ [facility]; Future  - CBC Auto Differential; Future  - COMPREHENSIVE METABOLIC PANEL; Future  - Lipid Panel; Future  - TSH; Future    2. Senile purpura  Continue to monitor  Followed by Neli Calderon MD .      3. Current mild episode of major depressive disorder without prior episode  Continue to monitor  Followed by Neli Calderon MD   No si/hi.  "     4. COPD with asthma  Continue to monitor  Followed by Dr Pérez.      5. Primary hypertension  Continue to monitor  Followed by Neli Calderon MD .    - CBC Auto Differential; Future  - COMPREHENSIVE METABOLIC PANEL; Future  - Lipid Panel; Future  - TSH; Future    6. Essential hypertension  Continue to monitor  Followed by Neli Calderon MD .    - lisinopriL (PRINIVIL,ZESTRIL) 30 MG tablet; Take 1 tablet (30 mg total) by mouth once daily.  Dispense: 90 tablet; Refill: 0    7. Encounter for screening mammogram for malignant neoplasm of breast    - Mammo Digital Screening Bilat w/ [facility]; Future      Provided Cindy with a 5-10 year written screening schedule and personal prevention plan. Recommendations were developed using the USPSTF age appropriate recommendations. Education, counseling, and referrals were provided as needed. After Visit Summary printed and given to patient which includes a list of additional screenings\tests needed.    Follow up in one year    Catalina Valles NP    I offered to discuss advanced care planning, including how to pick a person who would make decisions for you if you were unable to make them for yourself, called a health care power of , and what kind of decisions you might make such as use of life sustaining treatments such as ventilators and tube feeding when faced with a life limiting illness recorded on a living will that they will need to know. (How you want to be cared for as you near the end of your natural life)     X Patient is interested in learning more about how to make advanced directives.  I provided them paperwork and offered to discuss this with them.

## 2023-04-26 NOTE — PATIENT INSTRUCTIONS
Counseling and Referral of Other Preventative  (Italic type indicates deductible and co-insurance are waived)    Patient Name: Cindy Ramirez  Today's Date: 4/26/2023    Health Maintenance       Date Due Completion Date    Shingles Vaccine (1 of 2) Never done ---    Pneumococcal Vaccines (Age 65+) (2 - PPSV23 if available, else PCV20) 08/16/2019 6/21/2019    COVID-19 Vaccine (3 - Booster for Pfizer series) 05/26/2021 3/31/2021    Mammogram 04/13/2023 4/13/2022    Influenza Vaccine (1) 06/30/2023 (Originally 9/1/2022) ---    DEXA Scan 04/13/2024 4/13/2022    Lipid Panel 03/28/2027 3/28/2022    Colorectal Cancer Screening 01/08/2030 1/8/2020    TETANUS VACCINE 09/04/2031 9/4/2021        No orders of the defined types were placed in this encounter.    The following information is provided to all patients.  This information is to help you find resources for any of the problems found today that may be affecting your health:                Living healthy guide: www.[facility].[place].gov      Understanding Diabetes: www.diabetes.org      Eating healthy: www.cdc.gov/healthyweight      CDC home safety checklist: www.cdc.gov/steadi/patient.html      Agency on Aging: www.goea.[place].[facility]      Alcoholics anonymous (AA): www.aa.org      Physical Activity: www.riky.nih.gov/np1ydgk      Tobacco use: www.quitwithusla.org

## 2023-05-08 ENCOUNTER — PATIENT MESSAGE (OUTPATIENT)
Dept: ADMINISTRATIVE | Facility: HOSPITAL | Age: 73
End: 2023-05-08
Payer: MEDICARE

## 2023-05-08 ENCOUNTER — PATIENT OUTREACH (OUTPATIENT)
Dept: ADMINISTRATIVE | Facility: HOSPITAL | Age: 73
End: 2023-05-08
Payer: MEDICARE

## 2023-05-08 NOTE — PROGRESS NOTES
Population Health Chart Review & Patient Outreach Details:     Reason for Outreach Encounter:     []  Non-Compliant Report   []  Payor Report (Humana, PHN, BCBS, MSSP, MCIP, UHC, etc.)   [x]  Pre-Visit Chart Review     Updates Requested / Reviewed:     [x]  Care Everywhere    [x]     [x]  External Sources ([facility]Corp, Quest, DIS, etc.)   []  Care Team Updated    Patient Outreach Method:    []  Telephone Outreach Completed   [] Successful   [] Left Voicemail   [] Unable to Contact (wrong number, no voicemail)  [x]  [employer] [facility] Portal Outreach Sent  []  Letter Outreach Mailed  []  Fax Sent for External Records  []  External Records Upload Only    Health Maintenance Topics Addressed and Outreach Outcomes / Actions Taken:        [x]      Breast Cancer Screening []  Mammo Scheduled      []  External Records Requested     []  Patient Declined     []  Patient Will Call Back to Schedule     []  Patient Will Schedule with External Provider / Order Routed if Applicable             []       Cervical Cancer Screening []  Pap Scheduled      []  External Records Requested     []  Patient Declined     []  Patient Will Call Back to Schedule     []  Patient Will Schedule with External Provider               []          Colorectal Cancer Screening []  Colonoscopy Case Request or Referral Placed     []  External Records Requested     []  Patient Declined     []  Patient Will Call Back to Schedule     []  Patient Will Schedule with External Provider     []  Fit Kit Mailed (add the SmartPhrase under additional notes)     []  Reminded Patient to Complete Home Test             []      Diabetic Eye Exam []  Eye Camera Scheduled or Optometry Referral Placed     []  External Records Requested     []  Patient Declined     []  Patient Will Call Back to Schedule     []  Patient Will Schedule with External Provider             []      Blood Pressure Control []  Primary Care Follow Up Visit Scheduled     []  Remote Blood Pressure Reading  Captured     []  Patient Declined     []  Patient Will Call Back / Patient Will Send Portal Message with Reading     []  Patient Will Call Back to Schedule Provider Visit             []       HbA1c & Other Labs []  Lab Appt Scheduled for Due Labs     []  Primary Care Follow Up Visit Scheduled      []  Reminded Patient to Complete Home Test     []  Patient Declined     []  Patient Will Call Back to Schedule     []  Patient Will Schedule with External Provider / Order Routed if Applicable           []    Schedule Primary Care Appt []  Primary Care Appt Scheduled     []  Patient Declined     []  Patient Will Call Back to Schedule     []  Pt Established with External Provider & Updated Care Team             []      Medication Adherence []  Primary Care Appointment Scheduled     []  Reminder Added to Upcoming Primary Care Appt Notes     []  Patient Reminded to  Prescription     []  Patient Declined, Provider Notified if Needed     []  Sent Provider Message to Review and/or Add Exclusion to Problem List             []      Osteoporosis Screening []  DXA Appointment Scheduled     []  External Records Requested     []  Patient Declined     []  Patient Will Call Back to Schedule     []  Patient Will Schedule with External Provider / Order Routed if Applicable     Additional Care Coordinator Notes:         Further Action Needed If Patient Returns Outreach:

## 2023-05-08 NOTE — LETTER
May 16, 2023    Cindy Ramirez  [address] [address]             [facility]  [address]  [place] [address]  Phone: [phone]     Dear Kathleen, [facility] is committed to your overall health.  To help you get the most out of each of your visits, we will review your information to make sure you are up to date on all of your recommended tests and/or procedures.       As a new patient to Neli Calderon MD, we may not have your complete medical records.  After reviewing your chart have found that you may be due for the following:         Health Maintenance Due   Topic    Shingles Vaccine (1 of 2)    Pneumococcal Vaccines (Age 65+) (2 - PPSV23 if available, else PCV20)    COVID-19 Vaccine (3 - Booster for Pfizer series)    Mammogram          If you have had any of the above done at another facility, please bring the records or information with you so that your record at [facility] will be complete.       If you are currently taking medication, please bring it with you to your appointment for review.     Thank you for letting us care for you,        Your [facility] Primary Care Team

## 2023-05-11 ENCOUNTER — LAB VISIT (OUTPATIENT)
Dept: LAB | Facility: HOSPITAL | Age: 73
End: 2023-05-11
Attending: NURSE PRACTITIONER
Payer: MEDICARE

## 2023-05-11 DIAGNOSIS — I10 PRIMARY HYPERTENSION: ICD-10-CM

## 2023-05-11 DIAGNOSIS — Z00.00 ENCOUNTER FOR PREVENTIVE HEALTH EXAMINATION: ICD-10-CM

## 2023-05-11 LAB
ALBUMIN SERPL BCP-MCNC: 4 G/DL (ref 3.5–5.2)
ALP SERPL-CCNC: 85 U/L (ref 55–135)
ALT SERPL W/O P-5'-P-CCNC: 11 U/L (ref 10–44)
ANION GAP SERPL CALC-SCNC: 9 MMOL/L (ref 8–16)
AST SERPL-CCNC: 20 U/L (ref 10–40)
BASOPHILS # BLD AUTO: 0.05 K/UL (ref 0–0.2)
BASOPHILS NFR BLD: 0.7 % (ref 0–1.9)
BILIRUB SERPL-MCNC: 0.5 MG/DL (ref 0.1–1)
BUN SERPL-MCNC: 11 MG/DL (ref 8–23)
CALCIUM SERPL-MCNC: 9.8 MG/DL (ref 8.7–10.5)
CHLORIDE SERPL-SCNC: 102 MMOL/L (ref 95–110)
CHOLEST SERPL-MCNC: 217 MG/DL (ref 120–199)
CHOLEST/HDLC SERPL: 2.2 {RATIO} (ref 2–5)
CO2 SERPL-SCNC: 31 MMOL/L (ref 23–29)
CREAT SERPL-MCNC: 0.8 MG/DL (ref 0.5–1.4)
DIFFERENTIAL METHOD: ABNORMAL
EOSINOPHIL # BLD AUTO: 0.2 K/UL (ref 0–0.5)
EOSINOPHIL NFR BLD: 2.7 % (ref 0–8)
ERYTHROCYTE [DISTWIDTH] IN BLOOD BY AUTOMATED COUNT: 12.7 % (ref 11.5–14.5)
EST. GFR  (NO RACE VARIABLE): >60 ML/MIN/1.73 M^2
GLUCOSE SERPL-MCNC: 71 MG/DL (ref 70–110)
HCT VFR BLD AUTO: 48 % (ref 37–48.5)
HDLC SERPL-MCNC: 98 MG/DL (ref 40–75)
HDLC SERPL: 45.2 % (ref 20–50)
HGB BLD-MCNC: 15.5 G/DL (ref 12–16)
IMM GRANULOCYTES # BLD AUTO: 0.02 K/UL (ref 0–0.04)
IMM GRANULOCYTES NFR BLD AUTO: 0.3 % (ref 0–0.5)
LDLC SERPL CALC-MCNC: 103.2 MG/DL (ref 63–159)
LYMPHOCYTES # BLD AUTO: 1.1 K/UL (ref 1–4.8)
LYMPHOCYTES NFR BLD: 14.6 % (ref 18–48)
MCH RBC QN AUTO: 31.8 PG (ref 27–31)
MCHC RBC AUTO-ENTMCNC: 32.3 G/DL (ref 32–36)
MCV RBC AUTO: 99 FL (ref 82–98)
MONOCYTES # BLD AUTO: 0.6 K/UL (ref 0.3–1)
MONOCYTES NFR BLD: 8.1 % (ref 4–15)
NEUTROPHILS # BLD AUTO: 5.5 K/UL (ref 1.8–7.7)
NEUTROPHILS NFR BLD: 73.6 % (ref 38–73)
NONHDLC SERPL-MCNC: 119 MG/DL
NRBC BLD-RTO: 0 /100 WBC
PLATELET # BLD AUTO: 260 K/UL (ref 150–450)
PMV BLD AUTO: 10.3 FL (ref 9.2–12.9)
POTASSIUM SERPL-SCNC: 5 MMOL/L (ref 3.5–5.1)
PROT SERPL-MCNC: 7.3 G/DL (ref 6–8.4)
RBC # BLD AUTO: 4.87 M/UL (ref 4–5.4)
SODIUM SERPL-SCNC: 142 MMOL/L (ref 136–145)
TRIGL SERPL-MCNC: 79 MG/DL (ref 30–150)
TSH SERPL DL<=0.005 MIU/L-ACNC: 1.3 UIU/ML (ref 0.4–4)
WBC # BLD AUTO: 7.41 K/UL (ref 3.9–12.7)

## 2023-05-11 PROCEDURE — 36415 COLL VENOUS BLD VENIPUNCTURE: CPT | Mod: PN | Performed by: NURSE PRACTITIONER

## 2023-05-11 PROCEDURE — 80061 LIPID PANEL: CPT | Performed by: NURSE PRACTITIONER

## 2023-05-11 PROCEDURE — 80053 COMPREHEN METABOLIC PANEL: CPT | Performed by: NURSE PRACTITIONER

## 2023-05-11 PROCEDURE — 85025 COMPLETE CBC W/AUTO DIFF WBC: CPT | Performed by: NURSE PRACTITIONER

## 2023-05-11 PROCEDURE — 84443 ASSAY THYROID STIM HORMONE: CPT | Performed by: NURSE PRACTITIONER

## 2023-06-08 ENCOUNTER — OFFICE VISIT (OUTPATIENT)
Dept: FAMILY MEDICINE | Facility: CLINIC | Age: 73
End: 2023-06-08
Payer: MEDICARE

## 2023-06-08 VITALS
BODY MASS INDEX: 22.15 KG/M2 | HEIGHT: 63 IN | RESPIRATION RATE: 18 BRPM | DIASTOLIC BLOOD PRESSURE: 76 MMHG | SYSTOLIC BLOOD PRESSURE: 124 MMHG | WEIGHT: 125 LBS | HEART RATE: 78 BPM

## 2023-06-08 DIAGNOSIS — J44.89 ASTHMA-COPD OVERLAP SYNDROME: Chronic | ICD-10-CM

## 2023-06-08 DIAGNOSIS — R49.0 HOARSENESS, PERSISTENT: ICD-10-CM

## 2023-06-08 DIAGNOSIS — M85.852 OSTEOPENIA OF LEFT HIP: Chronic | ICD-10-CM

## 2023-06-08 DIAGNOSIS — J45.30 MILD PERSISTENT ASTHMA WITHOUT COMPLICATION: Chronic | ICD-10-CM

## 2023-06-08 DIAGNOSIS — I10 PRIMARY HYPERTENSION: Chronic | ICD-10-CM

## 2023-06-08 DIAGNOSIS — B35.1 ONYCHOMYCOSIS: Primary | ICD-10-CM

## 2023-06-08 PROBLEM — Z12.11 SCREEN FOR COLON CANCER: Status: RESOLVED | Noted: 2020-01-08 | Resolved: 2023-06-08

## 2023-06-08 PROBLEM — R29.898 WEAKNESS OF LEFT LOWER EXTREMITY: Status: RESOLVED | Noted: 2022-12-19 | Resolved: 2023-06-08

## 2023-06-08 PROBLEM — Z85.828 PERSONAL HISTORY OF SKIN CANCER: Chronic | Status: ACTIVE | Noted: 2022-10-13

## 2023-06-08 PROBLEM — F32.0 CURRENT MILD EPISODE OF MAJOR DEPRESSIVE DISORDER WITHOUT PRIOR EPISODE: Status: RESOLVED | Noted: 2022-04-20 | Resolved: 2023-06-08

## 2023-06-08 PROBLEM — R26.9 ALTERED GAIT: Status: RESOLVED | Noted: 2022-12-19 | Resolved: 2023-06-08

## 2023-06-08 PROBLEM — R26.89 IMPAIRMENT OF BALANCE: Status: RESOLVED | Noted: 2022-12-19 | Resolved: 2023-06-08

## 2023-06-08 PROBLEM — T84.498A MECHANICAL COMPLICATION OF INTERNAL ORTHOPEDIC IMPLANT: Status: RESOLVED | Noted: 2023-02-22 | Resolved: 2023-06-08

## 2023-06-08 PROBLEM — M25.672 STIFFNESS OF LEFT ANKLE JOINT: Status: RESOLVED | Noted: 2022-12-19 | Resolved: 2023-06-08

## 2023-06-08 PROBLEM — M25.675 JOINT STIFFNESS OF LEFT FOOT: Status: RESOLVED | Noted: 2022-12-19 | Resolved: 2023-06-08

## 2023-06-08 PROCEDURE — 99999 PR PBB SHADOW E&M-EST. PATIENT-LVL IV: CPT | Mod: PBBFAC,,, | Performed by: STUDENT IN AN ORGANIZED HEALTH CARE EDUCATION/TRAINING PROGRAM

## 2023-06-08 PROCEDURE — 99214 PR OFFICE/OUTPT VISIT, EST, LEVL IV, 30-39 MIN: ICD-10-PCS | Mod: S$PBB,,, | Performed by: STUDENT IN AN ORGANIZED HEALTH CARE EDUCATION/TRAINING PROGRAM

## 2023-06-08 PROCEDURE — 99999 PR PBB SHADOW E&M-EST. PATIENT-LVL IV: ICD-10-PCS | Mod: PBBFAC,,, | Performed by: STUDENT IN AN ORGANIZED HEALTH CARE EDUCATION/TRAINING PROGRAM

## 2023-06-08 PROCEDURE — 99214 OFFICE O/P EST MOD 30 MIN: CPT | Mod: S$PBB,,, | Performed by: STUDENT IN AN ORGANIZED HEALTH CARE EDUCATION/TRAINING PROGRAM

## 2023-06-08 PROCEDURE — 99214 OFFICE O/P EST MOD 30 MIN: CPT | Mod: PBBFAC,PN | Performed by: STUDENT IN AN ORGANIZED HEALTH CARE EDUCATION/TRAINING PROGRAM

## 2023-06-08 RX ORDER — TERBINAFINE HYDROCHLORIDE 250 MG/1
250 TABLET ORAL DAILY
Qty: 90 TABLET | Refills: 0 | Status: SHIPPED | OUTPATIENT
Start: 2023-06-08 | End: 2023-08-07 | Stop reason: SDUPTHER

## 2023-06-08 RX ORDER — TERBINAFINE HYDROCHLORIDE 250 MG/1
250 TABLET ORAL DAILY
Qty: 30 TABLET | Refills: 0 | Status: SHIPPED | OUTPATIENT
Start: 2023-06-08 | End: 2023-06-08

## 2023-06-08 NOTE — PROGRESS NOTES
Plan:     Cindy was seen today for annual exam and establish care.    Diagnoses and all orders for this visit:    Onychomycosis  -     Discontinue: terbinafine HCL (LAMISIL) 250 mg tablet; Take 1 tablet (250 mg total) by mouth once daily.  -     Comprehensive Metabolic Panel; Future  -     terbinafine HCL (LAMISIL) 250 mg tablet; Take 1 tablet (250 mg total) by mouth once daily.    Hoarseness, persistent  -     Ambulatory referral/consult to ENT; Future    Asthma-COPD overlap syndrome: Continue Trelegy daily and albuterol PRN.    Mild persistent asthma without complication: Continue Trelegy daily and albuterol PRN.    Primary hypertension: Stable, continue lisinopril 30 mg daily.    Osteopenia of left hip: Continue calcium + vit D supplementation       Follow up in about 3 months (around 9/8/2023), or if symptoms worsen or fail to improve.    Neli Calderon MD  06/08/2023    Subjective:      Patient ID: Cindy Ramirez is a 73 y.o. female    Chief Complaint   Patient presents with    Annual Exam    Establish Care     HPI  73 y.o. female with a PMHx as documented below presents to clinic today for the following:    Pt reports persistent hoarse voice of intermittent severity a few weeks after starting Trelegy inhaler (March 2023). No associated dysphagia, sore throat, neck swelling. No known thyroid issues. She has not yet been evaluated for this concern.    Onychomycosis:   Pt reports fungal infection of the great toenails of the bilateral feet, refractory to OTC topical treatment. Pt states she has never been treated w/ an oral medication for this issue.    COPD/asthma overlap syndrome:   - Trelegy 200-62.5-25 mch 1 puff daily  - Albuterol PRN    HTN:   - Lisinopril 30 mg daily     Osteopenia:   - DEXA (4/13/22) w/ osteopenia of the left hip, normal bone mineral density of the lumbar spine  - Daily calcium + vit D supplementation    ROS  Constitutional:  Negative for chills and fever.   Respiratory:  Negative  for shortness of breath.    Cardiovascular:  Negative for chest pain.   Gastrointestinal:  Negative for abdominal pain, constipation, diarrhea, nausea and vomiting.     Current Outpatient Medications   Medication Instructions    albuterol (PROAIR HFA) 90 mcg/actuation inhaler 2 puffs, Inhalation, Every 4 hours PRN, Rescue    albuterol (PROVENTIL/VENTOLIN HFA) 90 mcg/actuation inhaler 2 puffs, Inhalation, Every 6 hours PRN, Rescue    aspirin (ECOTRIN) 81 mg, Oral, Daily    azelastine (ASTELIN) 137 mcg, Nasal, 2 times daily    calcium-vitamin D3 (OS-SHABANA 500 + D3) 500 mg-5 mcg (200 unit) per tablet 1 tablet, Oral, 2 times daily with meals    fluticasone-umeclidin-vilanter (TRELEGY ELLIPTA) 200-62.5-25 mcg inhaler 1 puff, Inhalation, Daily    lisinopriL (PRINIVIL,ZESTRIL) 30 mg, Oral, Daily    multivitamin capsule 1 capsule, Oral, Daily    terbinafine HCL (LAMISIL) 250 mg, Oral, Daily      Past Medical History:   Diagnosis Date    Arthritis     Asthma-COPD overlap syndrome 03/02/2023    Basal cell carcinoma 2014    right cheek    Cataract     Current mild episode of major depressive disorder without prior episode 04/20/2022    HTN (hypertension) 09/19/2012    Hyperlipidemia     Hypertension     Insomnia     Mechanical complication of internal orthopedic implant 02/22/2023    Mild persistent asthma without complication 12/19/2022    Osteopenia of left hip 06/08/2023    Senile purpura 04/26/2023    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 ENDO;  Service: Endoscopy;  Laterality: N/A;    EYE SURGERY      cataract ou    FIXATION OF SYNDESMOSIS OF ANKLE Left 10/19/2022    Procedure: FIXATION, SYNDESMOSIS, ANKLE;  Surgeon: Allen Mauricio MD;  Location: [facility] OR;  Service:  "Orthopedics;  Laterality: Left;    HARDWARE REMOVAL Left 2/22/2023    Procedure: REMOVAL, HARDWARE;  Surgeon: Allen Mauricio MD;  Location: [facility] OR;  Service: Orthopedics;  Laterality: Left;  ankle    NECK SURGERY  2000    corpectomy    OPEN REDUCTION AND INTERNAL FIXATION (ORIF) OF INJURY OF ANKLE Left 10/19/2022    Procedure: ORIF, ANKLE;  Surgeon: Allen Mauricio MD;  Location: [facility] OR;  Service: Orthopedics;  Laterality: Left;    SPINAL FUSION      TONSILLECTOMY      tonsils       Review of patient's allergies indicates:   Allergen Reactions    Penicillins      Other reaction(s): Unknown     Family History   Problem Relation Age of Onset    Hypertension Mother     Macular degeneration Mother     Hypertension Father     Cancer Father         prostate    Pancreatic cancer Maternal Grandmother     Heart failure Maternal Grandfather     Dementia Paternal Grandmother     Breast cancer Maternal Aunt      Social History     Tobacco Use    Smoking status: Never     Passive exposure: Never    Smokeless tobacco: Never   Substance Use Topics    Alcohol use: Yes     Alcohol/week: 7.0 standard drinks     Types: 7 Glasses of wine per week    Drug use: No     Currently on File with [facility]:   Most Recent Immunizations   Administered Date(s) Administered    COVID-19, MRNA, LN-S, PF (Pfizer) (Purple Cap) 03/31/2021    Pneumococcal Conjugate - 13 Valent 06/21/2019    Td - PF (ADULT) 09/04/2021    Tdap 08/22/2016     Objective:      Vitals:    06/08/23 1400   BP: 124/76   BP Location: Right arm   Patient Position: Sitting   Pulse: 78   Resp: 18   Weight: 56.7 kg (125 lb)   Height: 5' 3" (1.6 m)     Body mass index is 22.14 kg/m².    Physical Exam   Constitutional:       General: No acute distress.  HENT:      Head: Normocephalic and atraumatic.   Pulmonary:      Effort: Pulmonary effort is normal. No respiratory distress.   Neurological:      General: No focal deficit present.      Mental Status: Alert and oriented to " person, place, and time. Mental status is at baseline.    Assessment:       1. Onychomycosis    2. Hoarseness, persistent    3. Asthma-COPD overlap syndrome    4. Mild persistent asthma without complication    5. Primary hypertension    6. Osteopenia of left hip        Neli Calderon MD  [facility]  Office: [phone]   Fax: [phone]  06/08/2023      Disclaimer: This note was partly generated using dictation software which may occasionally result in transcription errors.    Total time spent on this encounter includes face to face time and non-face to face time preparing to see the patient (eg, review of tests), obtaining and/or reviewing separately obtained history, documenting clinical information in the electronic or other health record, independently interpreting results, and communicating results to the patient/family/caregiver, or care coordinator.

## 2023-06-29 ENCOUNTER — OFFICE VISIT (OUTPATIENT)
Dept: OTOLARYNGOLOGY | Facility: CLINIC | Age: 73
End: 2023-06-29
Payer: MEDICARE

## 2023-06-29 VITALS
HEIGHT: 63 IN | DIASTOLIC BLOOD PRESSURE: 79 MMHG | SYSTOLIC BLOOD PRESSURE: 146 MMHG | HEART RATE: 68 BPM | WEIGHT: 125 LBS | BODY MASS INDEX: 22.15 KG/M2

## 2023-06-29 DIAGNOSIS — Z98.1 HISTORY OF FUSION OF CERVICAL SPINE: ICD-10-CM

## 2023-06-29 DIAGNOSIS — J45.909 MODERATE ASTHMA WITHOUT COMPLICATION, UNSPECIFIED WHETHER PERSISTENT: ICD-10-CM

## 2023-06-29 DIAGNOSIS — B37.89: ICD-10-CM

## 2023-06-29 DIAGNOSIS — T38.0X5A: ICD-10-CM

## 2023-06-29 DIAGNOSIS — J38.00 PARALYSIS OF VOCAL CORDS AND LARYNX, UNSPECIFIED: ICD-10-CM

## 2023-06-29 DIAGNOSIS — J38.01 PARESIS OF RIGHT VOCAL CORD: Primary | ICD-10-CM

## 2023-06-29 DIAGNOSIS — R49.0 HOARSENESS, PERSISTENT: ICD-10-CM

## 2023-06-29 PROCEDURE — 99204 PR OFFICE/OUTPT VISIT, NEW, LEVL IV, 45-59 MIN: ICD-10-PCS | Mod: 25,S$PBB,, | Performed by: OTOLARYNGOLOGY

## 2023-06-29 PROCEDURE — 31575 LARYNGOSCOPY: ICD-10-PCS | Mod: S$PBB,,, | Performed by: OTOLARYNGOLOGY

## 2023-06-29 PROCEDURE — 99999 PR PBB SHADOW E&M-EST. PATIENT-LVL V: ICD-10-PCS | Mod: PBBFAC,,, | Performed by: OTOLARYNGOLOGY

## 2023-06-29 PROCEDURE — 31575 DIAGNOSTIC LARYNGOSCOPY: CPT | Mod: PBBFAC,PO | Performed by: OTOLARYNGOLOGY

## 2023-06-29 PROCEDURE — 99999 PR PBB SHADOW E&M-EST. PATIENT-LVL V: CPT | Mod: PBBFAC,,, | Performed by: OTOLARYNGOLOGY

## 2023-06-29 PROCEDURE — 99204 OFFICE O/P NEW MOD 45 MIN: CPT | Mod: 25,S$PBB,, | Performed by: OTOLARYNGOLOGY

## 2023-06-29 PROCEDURE — 99215 OFFICE O/P EST HI 40 MIN: CPT | Mod: PBBFAC,PO,25 | Performed by: OTOLARYNGOLOGY

## 2023-06-29 RX ORDER — CLOTRIMAZOLE 10 MG/1
10 LOZENGE ORAL; TOPICAL
Qty: 70 TROCHE | Refills: 1 | Status: SHIPPED | OUTPATIENT
Start: 2023-06-29 | End: 2023-07-13

## 2023-06-29 NOTE — PATIENT INSTRUCTIONS
Multifactorial voice issues.  Potentially some side effects associated with increased dose of ACE inhibitor with some throat clearing which maybe contributing to some irritation edema, prior ACDF on the right side 23 years ago without any acute symptoms but findings of right vocal paresis which may be due to this procedure but needs CT scanning to be sure, and most acutely a new onset of hoarseness that seems to be timed with the start of inhaled combination agent corticosteroids with palatal as well as hypo pharyngeal thrush appearing to spare of the larynx.      We will proceed with contrast-enhanced CT of the neck from the skull base of the upper chest to follow the course of the right recurrent laryngeal nerve to rule out an unlikely underlying cause of vocal cord paresis from mass.  We will also use this to evaluate the bulge in the posterior hypopharynx which does not appear to be mucosal but likely vascular in origin though pulses not appreciated.    Speech therapy to reduce vocal strain and compensate from what is likely some decreased breath support, increased vocal strain and inflammation associated with medications on top of a pre-existing right vocal cord paralysis paresis with a right vocal cord generally in the paramedian midline position with gross adequate closure on endoscopy today.      Clotrimazole troches for thrush reserving Diflucan for failure as there does not appear to be extension of the actual thrush into the larynx.      Follow up in 6 weeks to review results and progress.  Patient to return sooner or contact the office with any worsening of symptoms or failure to improve.          WHAT TO EXPECT FROM VOICE THERAPY    Purpose  The purpose of voice therapy is to help you find a better, more efficient way to use your voice or to reduce symptoms such as coughing, throat clearing, or difficulty breathing.  Depending on your symptoms, you may learn how to produce clearer voice quality, how to  reduce fatigue or pain associated with speaking, how to take care of your voice, and how to eliminate chronic coughing or throat clearing.      Process: Evaluation  First, you may go through some initial testing.  In most cases, a videostroboscopy will be performed in order to allow your speech pathologist and your physician to look at your vocal cords to aid in deciding if you would benefit from voice therapy.  Next, you may work with the speech pathologist to assess the current capabilities of your voice.  Following evaluation, your speech pathologist will design a therapeutic plan to improve your voice as well as other symptoms that may bother you.  At the time of evaluation, your speech pathologist may provide you with exercises to try at home.      Process: Therapy  Most patients benefit from 2-8 sessions over 1-3 months.  Voice therapy involves changing the behavior of your vocal cords and speaking habits, so it is very important that you attend your appointments and do home practices as instructed by your speech pathologist.  Home practice and participation in therapy are critical to meeting your desired voice goals, so of course, we are able to work with you to schedule appointments that are convenient for you.          VOCAL HYGIENE AND HYDRATION RECOMMENDATIONS     DO   Drink plenty of waterabout  oz a day! If your urine is pale, you should be well-hydrated.  Try some of these tips to increase hydration as well.  Eat wet snacks such as grapes, melon, cucumbers, apple slices   Reduce intake coffee and other caffeinated and carbonated beverages   Suck on pastille lozenges or sugar free candies (not mentholated lozenges)   Use a room or personal humidifier   Use sinus rinses/irrigations or nasal saline spray   Inhale steam for a few minutes before speaking or singing   Pay attention to how, and how much, you use your voice.   Give yourself vocal breaks throughout the day.   Breathe when talking,  take frequent pauses for breath.   Use a microphone when speaking in front of a group of 15 or more to reduce voice strain.   Learn how to use your voice correctly to get loud with voice therapy techniques.  Stay healthy. Eat right and get plenty of rest. Follow a reflux precaution diet if indicated.   ____________________________________________________________________    REDUCE   Loud talking, yelling, cheering, or screaming. Voice injury can take place over a long time, or all at once.  If you need to talk loud or yell, be sure you are doing it properly.   Clearing your throat and forceful coughing. The vocal folds collect mucus when irritated or inflamed and this can cause the urge to clear your throat. The trauma of clearing the throat creates more inflammation and mucus. See tips above for keeping hydrated to assist with cutting back on throat clearing.  Instead of clearing your throat, try these behaviors until the sensation passes:   Take a sip of water   Silently clear with a hard exhale making an hhh sound   Swallow hard   Drying agents such as anti-histamines or decongestant medications. You should always take medications as prescribed, but keep in mind these will dry you out, so increase your hydration!   ____________________________________________________________________    AVOID   Inhalant irritants such as smoke, strong fumes, and allergens. Take advantage of 1-800-QUIT-NOW if you are ready to stop smoking.   Unnecessary non-speech noises, such as grunting during exercise, loud noises, or excessively high- or low-pitched sounds. Save your voice for talking and singing!   Whispering. Whispering places your vocal folds in a tenser position than usual.         THROAT CLEARING     Why am I clearing my throat so often?   Irritation of the vocal folds and surrounding area can cause the urge to clear your throat. This irritation can be caused by acid reflux, allergies, or environmental factors, as  well as from a cold or sore throat. Talk with your doctor about the treatment of possible underlying causes. However, throat clearing can turn into a cycle. You clear your throat after feeling an irritation, which causes more irritation, which causes you to continue clearing your throat, which causes more irritation.     What can I do about it?   Ask a friend or family member to help you keep track - you may not even realize how often you do it.   Replace the throat clearing with a different behavior.   Take a sip of water and then swallow. Repeat until the sensation subsides.  Swallow hard (even without water)   Use the silent throat clear method by making the h sound when you exhale  Breathe in deeply through your nose and exhale on shhh   Hum quietly   Keep yourself hydrated.   Drink plenty of water   Eat wet snacks (grapes, apples, melon, cucumber)   Use a humidifier at home or at work   Suck on hard candies, but avoid too much sugar and avoid anything with mint, menthol, camphor, or eucaplyptus, as these will have a more irritating effect.

## 2023-06-29 NOTE — PROGRESS NOTES
[facility]    Subjective:      Patient: Cindy Ramirez Patient PCP: Neli Calderon MD         :  1950     Sex:  female      MRN:  [id number]          Date of Visit: 2023      Chief Complaint: Hoarse (Onset about 4 months. Symptoms are intermittent. Denies having other associated symptoms along with hoarseness. Never a smoker. H/o asthma and COPD (no CPAP or O2). 0 pets at home. RSI: 45 VHI: 26)      Patient ID: Cindy Ramirez is a 73 y.o. female with past exposure to secondhand smoke but no personal tobacco history with a past medical history of HTN on Ace, unilateral facial paresis, asthma/COPD overlap syndrome with persistent asthma on Trelegy and as needed ProAir referred to me by Dr. Neli Calderon in consultation for hoarseness for last 4 months.  Voice handicap index is mild at 26.    Has had an increase in her dose of Zestril and does report some tickle and cough but not chronic or severe.  She also was just put on inhaled steroids in the past few months with some worsening of her underlying asthma to crossover COPD.  Some burning in the mouth and throat.  She rinses and gargles regularly after use of the Trelegy.  She had a history of ACDF in  with no acute voice change or swallowing issues at that time.    Review of Systems     Past Medical History  She has a past medical history of Arthritis, Asthma-COPD overlap syndrome, Basal cell carcinoma, Cataract, Current mild episode of major depressive disorder without prior episode, HTN (hypertension), Hyperlipidemia, Hypertension, Insomnia, Mechanical complication of internal orthopedic implant, Mild persistent asthma without complication, Osteopenia of left hip, Senile purpura, and Unilateral facial paresis.    Family / Surgical / Social History  Her family history includes Breast cancer in her maternal aunt; Cancer in her father; Dementia in her paternal grandmother; Heart failure in her maternal grandfather; Hypertension in  her father and mother; Macular degeneration in her mother; Pancreatic cancer in her maternal grandmother.    Past Surgical History:   Procedure Laterality Date    BACK SURGERY      CATARACT EXTRACTION W/  INTRAOCULAR LENS IMPLANT Right 08/22/2019    +12.5    COLONOSCOPY N/A 1/8/2020    Procedure: COLONOSCOPY;  Surgeon: Jameson Tovar MD;  Location: [facility] ENDO;  Service: Endoscopy;  Laterality: N/A;    EYE SURGERY      cataract ou    FIXATION OF SYNDESMOSIS OF ANKLE Left 10/19/2022    Procedure: FIXATION, SYNDESMOSIS, ANKLE;  Surgeon: Allen Mauricio MD;  Location: [facility] OR;  Service: Orthopedics;  Laterality: Left;    HARDWARE REMOVAL Left 2/22/2023    Procedure: REMOVAL, HARDWARE;  Surgeon: Allen Mauricio MD;  Location: [facility] OR;  Service: Orthopedics;  Laterality: Left;  ankle    NECK SURGERY  2000    corpectomy    OPEN REDUCTION AND INTERNAL FIXATION (ORIF) OF INJURY OF ANKLE Left 10/19/2022    Procedure: ORIF, ANKLE;  Surgeon: Allen Mauricio MD;  Location: [facility] OR;  Service: Orthopedics;  Laterality: Left;    SPINAL FUSION      TONSILLECTOMY      tonsils         Social History     Tobacco Use    Smoking status: Never     Passive exposure: Never    Smokeless tobacco: Never   Substance and Sexual Activity    Alcohol use: Yes     Alcohol/week: 7.0 standard drinks     Types: 7 Glasses of wine per week    Drug use: No    Sexual activity: Yes     Partners: Male     Birth control/protection: None       Medications  She has a current medication list which includes the following prescription(s): aspirin, calcium-vitamin d3, trelegy ellipta, lisinopril, multivitamin, terbinafine hcl, and albuterol.      Allergies  Review of patient's allergies indicates:   Allergen Reactions    Penicillins      Other reaction(s): Unknown       All medications, allergies, and past history have been reviewed.    Objective:      Vitals:  Vitals - 1 value per visit 6/8/2023 6/29/2023 6/29/2023   SYSTOLIC 124 - 146   DIASTOLIC 76 -  79   Pulse 78 - 68   Temp - - -   Resp 18 - -   SPO2 - - -   Weight (lb) 125 - 125   Weight (kg) 56.7 - 56.7   Height 63 - 63   BMI (Calculated) 22.1 - 22.1   VISIT REPORT - - -   Pain Score  - 0 -   Some recent data might be hidden       Body surface area is 1.59 meters squared.    Physical Exam:    GENERAL  APPEARANCE -  alert, appears stated age, and cooperative  BARRIER(S) TO COMMUNICATION -  hearing loss VOICE - mild to moderate strain, not breathy    INTEGUMENTARY  no suspicious head and neck lesions    HEENT  HEAD: Normocephalic, without obvious abnormality, atraumatic  FACE: INSPECTION - Symmetric, no signs of trauma, no suspicious lesion(s)  PALPATION -  No masses SALIVARY GLANDS - non-tender with no appreciable mass  STRENGTH - right facial palsy  NECK/THYROID: normal atraumatic, no neck masses, normal thyroid, no jvd    EYES  Normal occular alignment and mobility with no visible nystagmus at rest    EARS/NOSE/MOUTH/THROAT  EARS  PINNAE AND EXTERNAL EARS - right atresia OTOSCOPIC EXAM (surgical microscopy was not used for visualization/instrumentation): EAR EXAM - Normal ear canals, tympanic membranes and mobility, and middle ear spaces bilaterally.  HEARING - impaired    NOSE AND SINUSES  EXTERNAL NOSE - Grossly normal for age/sex  SEPTUM - normal/no obstruction on anterior exam without decongestion TURBINATES - within normal limits MUCOSA - mild congestion, no pus or active drainage     MOUTH AND THROAT   ORAL CAVITY, LIPS, TEETH, GUMS & TONGUE - moist, no suspicious lesions  OROPHARYNX /TONSILS/PHARYNGEAL WALLS/HYPOPHARYNX - no erythema or exudates  NASOPHARYNX - limited mirror exam - unable to visualize due to anatomy/gag  LARYNX -  - limited mirror exam - unable to visualize due to anatomy/gag      CHEST AND LUNG   INSPECTION & AUSCULTATION - normal effort, no stridor    CARDIOVASCULAR  AUSCULTATION & PERIPHERAL VASCULAR - regular rate and rhythm.    NEUROLOGIC  MENTAL STATUS - alert, interactive  CRANIAL NERVES - normal    LYMPHATIC  HEAD AND NECK - non-palpable      Procedure(s):  Flexible laryngoscopy performed.  See procedure note.          Labs:  WBC   Date Value Ref Range Status   05/11/2023 7.41 3.90 - 12.70 K/uL Final     Hemoglobin   Date Value Ref Range Status   05/11/2023 15.5 12.0 - 16.0 g/dL Final     Platelets   Date Value Ref Range Status   05/11/2023 260 150 - 450 K/uL Final     Creatinine   Date Value Ref Range Status   05/11/2023 0.8 0.5 - 1.4 mg/dL Final     TSH   Date Value Ref Range Status   05/11/2023 1.296 0.400 - 4.000 uIU/mL Final     Glucose   Date Value Ref Range Status   05/11/2023 71 70 - 110 mg/dL Final     Hemoglobin A1C   Date Value Ref Range Status   02/09/2021 5.0 4.0 - 5.6 % Final     Comment:     ADA Screening Guidelines:  5.7-6.4%  Consistent with prediabetes  >or=6.5%  Consistent with diabetes    High levels of fetal hemoglobin interfere with the HbA1C  assay. Heterozygous hemoglobin variants (HbS, HgC, etc)do  not significantly interfere with this assay.   However, presence of multiple variants may affect accuracy.           Assessment:      Problem List Items Addressed This Visit          ENT    Hoarseness, persistent     Other Visit Diagnoses       Paresis of right vocal cord    -  Primary    History of fusion of cervical spine        Acute pharyngeal candidiasis        Adverse effect of inhaled corticosteroid        Paralysis of vocal cords and larynx, unspecified        Moderate asthma without complication, unspecified whether persistent                     Plan:      Multifactorial voice issues.  Potentially some side effects associated with increased dose of ACE inhibitor with some throat clearing which maybe contributing to some irritation edema, prior ACDF on the right side 23 years ago without any acute symptoms but findings of right vocal paresis which may be due to this procedure but needs CT scanning to be sure, and most acutely a new onset of hoarseness that  seems to be timed with the start of inhaled combination agent corticosteroids with palatal as well as hypo pharyngeal thrush appearing to spare of the larynx.      We will proceed with contrast-enhanced CT of the neck from the skull base of the upper chest to follow the course of the right recurrent laryngeal nerve to rule out an unlikely underlying cause of vocal cord paresis from mass.  We will also use this to evaluate the bulge in the posterior hypopharynx which does not appear to be mucosal but likely vascular in origin though pulses not appreciated.    Speech therapy to reduce vocal strain and compensate from what is likely some decreased breath support, increased vocal strain and inflammation associated with medications on top of a pre-existing right vocal cord paralysis paresis with a right vocal cord generally in the paramedian midline position with gross adequate closure on endoscopy today.      Clotrimazole troches for thrush reserving Diflucan for failure as there does not appear to be extension of the actual thrush into the larynx.      Follow up in 6 weeks to review results and progress.  Patient to return sooner or contact the office with any worsening of symptoms or failure to improve.

## 2023-06-29 NOTE — PROCEDURES
"Laryngoscopy    Date/Time: 6/29/2023 1:40 PM  Performed by: Irineo Kearney MD  Authorized by: Irineo Kearney MD     Time out: Immediately prior to procedure a "time out" was called to verify the correct patient, procedure, equipment, support staff and site/side marked as required.    Consent Done?:  Yes (Verbal)  Anesthesia:     Local anesthetic:  Other    Patient tolerance:  Patient tolerated the procedure well with no immediate complications    Decongestion performed?: Yes    Laryngoscopy:     Areas examined:  Nasal cavities, nasopharynx, oropharynx, hypopharynx, larynx and vocal cords  Larynx/hypopharynx:      Topical Afrin and 4% lidocaine transnasally followed by flexible video endoscopy with disposable Ambu scope.  Normal nasopharynx.  Patchy white spots throughout the posterior oropharynx and hypopharynx but seeming to spare the supraglottis and endo larynx.  The right true vocal cord has some slight abduction and seems to have full closure with left true vocal cord which has a more normal complete abduction with good compensation.  No pooling of secretions in either piriform sinus or the vallecula.  There is a fullness covering the arytenoids from the posterior wall which appears to be submucosal and nonpulsatile.  See photos.  "

## 2023-06-30 ENCOUNTER — LAB VISIT (OUTPATIENT)
Dept: LAB | Facility: HOSPITAL | Age: 73
End: 2023-06-30
Attending: OTOLARYNGOLOGY
Payer: MEDICARE

## 2023-06-30 DIAGNOSIS — R49.0 HOARSENESS, PERSISTENT: ICD-10-CM

## 2023-06-30 DIAGNOSIS — J38.00 PARALYSIS OF VOCAL CORDS AND LARYNX, UNSPECIFIED: ICD-10-CM

## 2023-06-30 DIAGNOSIS — J38.01 PARESIS OF RIGHT VOCAL CORD: ICD-10-CM

## 2023-06-30 LAB
CREAT SERPL-MCNC: 0.8 MG/DL (ref 0.5–1.4)
EST. GFR  (NO RACE VARIABLE): >60 ML/MIN/1.73 M^2

## 2023-06-30 PROCEDURE — 82565 ASSAY OF CREATININE: CPT | Performed by: OTOLARYNGOLOGY

## 2023-06-30 PROCEDURE — 36415 COLL VENOUS BLD VENIPUNCTURE: CPT | Mod: PN | Performed by: OTOLARYNGOLOGY

## 2023-07-03 ENCOUNTER — HOSPITAL ENCOUNTER (OUTPATIENT)
Dept: RADIOLOGY | Facility: HOSPITAL | Age: 73
Discharge: HOME | End: 2023-07-03
Attending: OTOLARYNGOLOGY
Payer: MEDICARE

## 2023-07-03 DIAGNOSIS — J38.00 PARALYSIS OF VOCAL CORDS AND LARYNX, UNSPECIFIED: ICD-10-CM

## 2023-07-03 PROCEDURE — 25500020 PHARM REV CODE 255: Mod: PO | Performed by: OTOLARYNGOLOGY

## 2023-07-03 PROCEDURE — 70491 CT SOFT TISSUE NECK WITH CONTRAST: ICD-10-PCS | Mod: 26,,, | Performed by: RADIOLOGY

## 2023-07-03 PROCEDURE — 70491 CT SOFT TISSUE NECK W/DYE: CPT | Mod: 26,,, | Performed by: RADIOLOGY

## 2023-07-03 PROCEDURE — 70491 CT SOFT TISSUE NECK W/DYE: CPT | Mod: TC,PO

## 2023-07-03 RX ADMIN — IOHEXOL 75 ML: 350 INJECTION, SOLUTION INTRAVENOUS at 03:07

## 2023-08-07 ENCOUNTER — OFFICE VISIT (OUTPATIENT)
Dept: FAMILY MEDICINE | Facility: CLINIC | Age: 73
End: 2023-08-07
Payer: MEDICARE

## 2023-08-07 ENCOUNTER — LAB VISIT (OUTPATIENT)
Dept: LAB | Facility: HOSPITAL | Age: 73
End: 2023-08-07
Attending: STUDENT IN AN ORGANIZED HEALTH CARE EDUCATION/TRAINING PROGRAM
Payer: MEDICARE

## 2023-08-07 VITALS
HEIGHT: 63 IN | RESPIRATION RATE: 18 BRPM | DIASTOLIC BLOOD PRESSURE: 74 MMHG | HEART RATE: 75 BPM | BODY MASS INDEX: 22.3 KG/M2 | OXYGEN SATURATION: 96 % | SYSTOLIC BLOOD PRESSURE: 118 MMHG | WEIGHT: 125.88 LBS

## 2023-08-07 DIAGNOSIS — I10 ESSENTIAL HYPERTENSION: ICD-10-CM

## 2023-08-07 DIAGNOSIS — B35.1 ONYCHOMYCOSIS: ICD-10-CM

## 2023-08-07 DIAGNOSIS — B35.1 ONYCHOMYCOSIS: Primary | ICD-10-CM

## 2023-08-07 PROBLEM — J45.40 MODERATE PERSISTENT ASTHMA WITHOUT COMPLICATION: Chronic | Status: ACTIVE | Noted: 2022-12-19

## 2023-08-07 PROBLEM — J45.40 MODERATE PERSISTENT ASTHMA WITHOUT COMPLICATION: Status: ACTIVE | Noted: 2022-12-19

## 2023-08-07 PROBLEM — J38.01 PARESIS OF RIGHT VOCAL CORD: Status: ACTIVE | Noted: 2023-08-07

## 2023-08-07 LAB
ALBUMIN SERPL BCP-MCNC: 3.7 G/DL (ref 3.5–5.2)
ALP SERPL-CCNC: 67 U/L (ref 55–135)
ALT SERPL W/O P-5'-P-CCNC: 12 U/L (ref 10–44)
ANION GAP SERPL CALC-SCNC: 10 MMOL/L (ref 8–16)
AST SERPL-CCNC: 17 U/L (ref 10–40)
BILIRUB SERPL-MCNC: 0.3 MG/DL (ref 0.1–1)
BUN SERPL-MCNC: 18 MG/DL (ref 8–23)
CALCIUM SERPL-MCNC: 10.3 MG/DL (ref 8.7–10.5)
CHLORIDE SERPL-SCNC: 96 MMOL/L (ref 95–110)
CO2 SERPL-SCNC: 29 MMOL/L (ref 23–29)
CREAT SERPL-MCNC: 1 MG/DL (ref 0.5–1.4)
EST. GFR  (NO RACE VARIABLE): 59.5 ML/MIN/1.73 M^2
GLUCOSE SERPL-MCNC: 92 MG/DL (ref 70–110)
POTASSIUM SERPL-SCNC: 4.8 MMOL/L (ref 3.5–5.1)
PROT SERPL-MCNC: 6.8 G/DL (ref 6–8.4)
SODIUM SERPL-SCNC: 135 MMOL/L (ref 136–145)

## 2023-08-07 PROCEDURE — 80053 COMPREHEN METABOLIC PANEL: CPT | Performed by: STUDENT IN AN ORGANIZED HEALTH CARE EDUCATION/TRAINING PROGRAM

## 2023-08-07 PROCEDURE — 99214 OFFICE O/P EST MOD 30 MIN: CPT | Mod: S$PBB,,, | Performed by: STUDENT IN AN ORGANIZED HEALTH CARE EDUCATION/TRAINING PROGRAM

## 2023-08-07 PROCEDURE — 99999 PR PBB SHADOW E&M-EST. PATIENT-LVL III: CPT | Mod: PBBFAC,,, | Performed by: STUDENT IN AN ORGANIZED HEALTH CARE EDUCATION/TRAINING PROGRAM

## 2023-08-07 PROCEDURE — 99999 PR PBB SHADOW E&M-EST. PATIENT-LVL III: ICD-10-PCS | Mod: PBBFAC,,, | Performed by: STUDENT IN AN ORGANIZED HEALTH CARE EDUCATION/TRAINING PROGRAM

## 2023-08-07 PROCEDURE — 99214 PR OFFICE/OUTPT VISIT, EST, LEVL IV, 30-39 MIN: ICD-10-PCS | Mod: S$PBB,,, | Performed by: STUDENT IN AN ORGANIZED HEALTH CARE EDUCATION/TRAINING PROGRAM

## 2023-08-07 PROCEDURE — 99213 OFFICE O/P EST LOW 20 MIN: CPT | Mod: PBBFAC,PN | Performed by: STUDENT IN AN ORGANIZED HEALTH CARE EDUCATION/TRAINING PROGRAM

## 2023-08-07 PROCEDURE — 36415 COLL VENOUS BLD VENIPUNCTURE: CPT | Mod: PN | Performed by: STUDENT IN AN ORGANIZED HEALTH CARE EDUCATION/TRAINING PROGRAM

## 2023-08-07 RX ORDER — TERBINAFINE HYDROCHLORIDE 250 MG/1
250 TABLET ORAL DAILY
Qty: 90 TABLET | Refills: 0 | Status: SHIPPED | OUTPATIENT
Start: 2023-08-07 | End: 2023-08-07

## 2023-08-07 RX ORDER — LISINOPRIL 30 MG/1
30 TABLET ORAL DAILY
Qty: 90 TABLET | Refills: 1 | Status: SHIPPED | OUTPATIENT
Start: 2023-08-07 | End: 2023-08-09 | Stop reason: SDUPTHER

## 2023-08-07 NOTE — PROGRESS NOTES
Plan:      Cindy was seen today for follow-up and medication refill.    Diagnoses and all orders for this visit:    Onychomycosis: Repeat CMP today.    Essential hypertension  -     lisinopriL (PRINIVIL,ZESTRIL) 30 MG tablet; Take 1 tablet (30 mg total) by mouth once daily.    Follow up in about 6 months (around 2/7/2024), or if symptoms worsen or fail to improve.    Neli Calderon MD  08/07/2023    Subjective:      Patient ID: Cindy Ramirez is a 73 y.o. female    Chief Complaint   Patient presents with    Follow-up    Medication Refill     linsinopril     HPI  73 y.o. female with a PMHx as documented below presents to clinic today for the following:    Onychomycosis:   - Terbinafine 250 mg daily x3 months (currently 2 months into Tx)  - Refractory to OTC topical treatment  - Due for CMP     COPD/asthma overlap syndrome:   - Trelegy 200-62.5-25 mch 1 puff daily  - Albuterol PRN     HTN:   - Lisinopril 30 mg daily      Osteopenia:   - DEXA (4/13/22) w/ osteopenia of the left hip, normal bone mineral density of the lumbar spine  - Daily calcium + vit D supplementation    ROS  Constitutional:  Negative for chills and fever.   Respiratory:  Negative for shortness of breath.    Cardiovascular:  Negative for chest pain.   Gastrointestinal:  Negative for abdominal pain, constipation, diarrhea, nausea and vomiting.     Current Outpatient Medications   Medication Instructions    albuterol (PROAIR HFA) 90 mcg/actuation inhaler 2 puffs, Inhalation, Every 4 hours PRN, Rescue    calcium-vitamin D3 (OS-SHABANA 500 + D3) 500 mg-5 mcg (200 unit) per tablet 1 tablet, Oral, 2 times daily with meals    fluticasone-umeclidin-vilanter (TRELEGY ELLIPTA) 200-62.5-25 mcg inhaler 1 puff, Inhalation, Daily    lisinopriL (PRINIVIL,ZESTRIL) 30 mg, Oral, Daily    multivitamin capsule 1 capsule, Oral, Daily      Past Medical History:   Diagnosis Date    Arthritis     Asthma-COPD overlap syndrome 03/02/2023    Basal cell carcinoma 2014     "right cheek    Cataract     Current mild episode of major depressive disorder without prior episode 04/20/2022    HTN (hypertension) 09/19/2012    Hyperlipidemia     Hypertension     Insomnia     Mechanical complication of internal orthopedic implant 02/22/2023    Moderate persistent asthma without complication 12/19/2022    Osteopenia of left hip 06/08/2023    Paresis of right vocal cord     Senile purpura 04/26/2023    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    Physical Exam  Vitals reviewed.   Constitutional:       General: She is not in acute distress.  HENT:      Head: Normocephalic and atraumatic.   Cardiovascular:      Rate and Rhythm: Normal rate.   Pulmonary:      Effort: Pulmonary effort is normal. No respiratory distress.   Skin:     Comments: Discolored, thickened nails with clear line between healthy nail and nail affected by fungal infection.   Neurological:      General: No focal deficit present.      Mental Status: She is alert and oriented to person, place, and time. Mental status is at baseline.       Assessment:       1. Onychomycosis    2. Essential hypertension        Neli Calderon MD  [facility]  Office: [phone]   Fax: [phone]  08/07/2023      Disclaimer: This note was partly generated using dictation software which may occasionally result in transcription errors.    Total time spent on this encounter includes face to face time and non-face to face time preparing to see the patient (eg, review of tests), obtaining and/or reviewing separately obtained history, documenting clinical information in the electronic or other health record, independently interpreting results, and communicating results to the " patient/family/caregiver, or care coordinator.

## 2023-08-09 DIAGNOSIS — I10 ESSENTIAL HYPERTENSION: ICD-10-CM

## 2023-08-09 RX ORDER — LISINOPRIL 30 MG/1
30 TABLET ORAL DAILY
Qty: 90 TABLET | Refills: 1 | Status: SHIPPED | OUTPATIENT
Start: 2023-08-09 | End: 2024-02-21 | Stop reason: SDUPTHER

## 2023-08-15 DIAGNOSIS — J45.909 MODERATE ASTHMA WITHOUT COMPLICATION, UNSPECIFIED WHETHER PERSISTENT: ICD-10-CM

## 2023-08-15 RX ORDER — ALBUTEROL SULFATE 90 UG/1
AEROSOL, METERED RESPIRATORY (INHALATION)
Qty: 51 G | Refills: 0 | OUTPATIENT
Start: 2023-08-15

## 2023-08-15 NOTE — TELEPHONE ENCOUNTER
Refill Decision Note   Cindy Ramirez  is requesting a refill authorization.  Brief Assessment and Rationale for Refill:  Quick Discontinue     Medication Therapy Plan:       Medication Reconciliation Completed: No   Comments:     No Care Gaps recommended.     Note composed:11:07 AM 08/15/2023

## 2023-08-15 NOTE — TELEPHONE ENCOUNTER
No care due was identified.  [facility] Embedded Care Due Messages. Reference number: [id number].   8/15/2023 9:10:26 AM CDT

## 2023-08-29 ENCOUNTER — OFFICE VISIT (OUTPATIENT)
Dept: OPHTHALMOLOGY | Facility: CLINIC | Age: 73
End: 2023-08-29
Payer: MEDICARE

## 2023-08-29 DIAGNOSIS — Z96.1 PSEUDOPHAKIA OF BOTH EYES: ICD-10-CM

## 2023-08-29 DIAGNOSIS — H52.7 REFRACTIVE ERRORS: ICD-10-CM

## 2023-08-29 DIAGNOSIS — H26.493 PCO (POSTERIOR CAPSULAR OPACIFICATION), BILATERAL: Primary | ICD-10-CM

## 2023-08-29 PROCEDURE — 92015 DETERMINE REFRACTIVE STATE: CPT | Mod: ,,, | Performed by: OPTOMETRIST

## 2023-08-29 PROCEDURE — 92014 PR EYE EXAM, EST PATIENT,COMPREHESV: ICD-10-PCS | Mod: S$PBB,,, | Performed by: OPTOMETRIST

## 2023-08-29 PROCEDURE — 99213 OFFICE O/P EST LOW 20 MIN: CPT | Mod: PBBFAC | Performed by: OPTOMETRIST

## 2023-08-29 PROCEDURE — 92015 PR REFRACTION: ICD-10-PCS | Mod: ,,, | Performed by: OPTOMETRIST

## 2023-08-29 PROCEDURE — 92014 COMPRE OPH EXAM EST PT 1/>: CPT | Mod: S$PBB,,, | Performed by: OPTOMETRIST

## 2023-08-29 PROCEDURE — 99999 PR PBB SHADOW E&M-EST. PATIENT-LVL III: ICD-10-PCS | Mod: PBBFAC,,, | Performed by: OPTOMETRIST

## 2023-08-29 PROCEDURE — 99999 PR PBB SHADOW E&M-EST. PATIENT-LVL III: CPT | Mod: PBBFAC,,, | Performed by: OPTOMETRIST

## 2023-08-29 NOTE — PROGRESS NOTES
HPI     Blurred Vision     Additional comments: Hard of hearing: Patient here today for blurry   vision in the right eye. Patient states she feels unsafe when driving at   night. Patient had Phaco OD +12.5 SN60WF / CDE: 9.00 / 8/22/19. Patient   denies pin and discomfort.            Comments    SH- Lost Son after 8/22 phaco, unexpectedly     Possible steroid response to Durezol  H/O SCL WEAR 60 YEARS  Phaco OD +12.5 SN60WF / CDE: 9.00 / 8/22/19   Phaco OS +10.5 SN60WF / CDE: 18.98 / 9/12/2019          Last edited by Deidre Tapia on 8/29/2023 10:27 AM.            Assessment /Plan     For exam results, see Encounter Report.    PCO (posterior capsular opacification), bilateral  -     Ambulatory referral/consult to Ophthalmology; Future; Expected date: 09/05/2023    Pseudophakia of both eyes    Refractive errors      Significant PCO OU  Consult Dr Mitchell for YAG eval OU    Mother had AMD,  Normal OCT OU, see in [facility]

## 2023-09-01 ENCOUNTER — PATIENT MESSAGE (OUTPATIENT)
Dept: FAMILY MEDICINE | Facility: CLINIC | Age: 73
End: 2023-09-01
Payer: MEDICARE

## 2023-09-01 ENCOUNTER — PATIENT MESSAGE (OUTPATIENT)
Dept: OPHTHALMOLOGY | Facility: CLINIC | Age: 73
End: 2023-09-01
Payer: MEDICARE

## 2023-09-01 ENCOUNTER — TELEPHONE (OUTPATIENT)
Dept: OPHTHALMOLOGY | Facility: CLINIC | Age: 73
End: 2023-09-01
Payer: MEDICARE

## 2023-09-01 DIAGNOSIS — J45.909 MODERATE ASTHMA WITHOUT COMPLICATION, UNSPECIFIED WHETHER PERSISTENT: ICD-10-CM

## 2023-09-01 RX ORDER — ALBUTEROL SULFATE 90 UG/1
2 AEROSOL, METERED RESPIRATORY (INHALATION) EVERY 4 HOURS PRN
Qty: 54 G | Refills: 3 | Status: SHIPPED | OUTPATIENT
Start: 2023-09-01 | End: 2023-09-01 | Stop reason: SDUPTHER

## 2023-09-01 RX ORDER — ALBUTEROL SULFATE 90 UG/1
2 AEROSOL, METERED RESPIRATORY (INHALATION) EVERY 4 HOURS PRN
Qty: 54 G | Refills: 3 | Status: SHIPPED | OUTPATIENT
Start: 2023-09-01 | End: 2024-02-21 | Stop reason: SDUPTHER

## 2023-09-01 NOTE — TELEPHONE ENCOUNTER
No care due was identified.  Health [facility] Embedded Care Due Messages. Reference number: [id number].   9/01/2023 10:16:42 AM CDT

## 2023-11-30 ENCOUNTER — TELEPHONE (OUTPATIENT)
Dept: ORTHOPEDICS | Facility: CLINIC | Age: 73
End: 2023-11-30
Payer: MEDICARE

## 2023-11-30 NOTE — TELEPHONE ENCOUNTER
----- Message from Desiree Rahman MA sent at 11/30/2023  8:10 AM CST -----  Contact: Cindy  This message was supposed to go to Dr Mauricio staff  ----- Message -----  From: Ariana Cooper  Sent: 11/29/2023   4:30 PM CST  To: [facility] Staff    Pt was calling to schedule her procedure. She has been waiting 4 months. Please call her back at [phone].    Thanks  TS

## 2023-12-27 ENCOUNTER — OFFICE VISIT (OUTPATIENT)
Dept: OPHTHALMOLOGY | Facility: CLINIC | Age: 73
End: 2023-12-27
Payer: MEDICARE

## 2023-12-27 DIAGNOSIS — Z96.1 PSEUDOPHAKIA OF BOTH EYES: ICD-10-CM

## 2023-12-27 DIAGNOSIS — H26.493 PCO (POSTERIOR CAPSULAR OPACIFICATION), BILATERAL: Primary | ICD-10-CM

## 2023-12-27 PROCEDURE — 66821 AFTER CATARACT LASER SURGERY: CPT | Mod: 50,PBBFAC | Performed by: OPHTHALMOLOGY

## 2023-12-27 PROCEDURE — 99214 OFFICE O/P EST MOD 30 MIN: CPT | Mod: 57,S$PBB,, | Performed by: OPHTHALMOLOGY

## 2023-12-27 PROCEDURE — 99213 OFFICE O/P EST LOW 20 MIN: CPT | Mod: PBBFAC | Performed by: OPHTHALMOLOGY

## 2023-12-27 PROCEDURE — 66821 AFTER CATARACT LASER SURGERY: CPT | Mod: S$PBB,50,, | Performed by: OPHTHALMOLOGY

## 2023-12-27 PROCEDURE — 66821 AFTER CATARACT LASER SURGERY: CPT | Mod: PBBFAC,RT,LT | Performed by: OPHTHALMOLOGY

## 2023-12-27 PROCEDURE — 66821 PR DISCISSION,2ND CATARACT,LASER: ICD-10-PCS | Mod: S$PBB,50,, | Performed by: OPHTHALMOLOGY

## 2023-12-27 PROCEDURE — 99999 PR PBB SHADOW E&M-EST. PATIENT-LVL III: ICD-10-PCS | Mod: PBBFAC,,, | Performed by: OPHTHALMOLOGY

## 2023-12-27 PROCEDURE — 99999 PR PBB SHADOW E&M-EST. PATIENT-LVL III: CPT | Mod: PBBFAC,,, | Performed by: OPHTHALMOLOGY

## 2023-12-27 PROCEDURE — 99214 PR OFFICE/OUTPT VISIT, EST, LEVL IV, 30-39 MIN: ICD-10-PCS | Mod: 57,S$PBB,, | Performed by: OPHTHALMOLOGY

## 2023-12-27 RX ORDER — PREDNISOLONE ACETATE 10 MG/ML
1 SUSPENSION/ DROPS OPHTHALMIC 4 TIMES DAILY
Qty: 5 ML | Refills: 0 | Status: SHIPPED | OUTPATIENT
Start: 2023-12-27 | End: 2024-12-26

## 2023-12-27 NOTE — PROGRESS NOTES
HPI     Blurred Vision            Comments: Patient reports for blurred VA OU. Patient denies pain and   discomfort. Patient states she sometimes feels foreign body sensation OU.   Patient not currently using any gtts. No further complaints at this time.              Comments    Hard of hearing   SH- Lost Son after 8/22 phaco, unexpectedly     Possible steroid response to Durezol  H/O SCL WEAR 60 YEARS  Phaco OD +12.5 SN60WF / CDE: 9.00 / 8/22/19   Phaco OS +10.5 SN60WF / CDE: 18.98 / 9/12/2019             Last edited by Alondra Kirkpatrick MA on 12/27/2023  2:44 PM.            Assessment /Plan     For exam results, see Encounter Report.      ICD-10-CM ICD-9-CM    1. PCO (posterior capsular opacification), bilateral  H26.493 366.50 Ambulatory referral/consult to Ophthalmology      Yag Capsulotomy - OU - Both Eyes    Yag Operative Procedure Note    73 y.o. year old patient experiencing a symptomatic decrease in vision OU with inability to perform activities of daily living including reading.      SLE: Posterior intraocular lens implant with capsular fibrosis     Risks, benefits and alternatives of Yag Laser Capsulotomy discussed. Including risks of retinal detachment (1-3%), macular edema, dislocated implant, pain, inflammation elevated intraocular pressure and vision loss. Consent signed.  Time out procedure form completed prior to laser.    Medications given:  Alphagan 0.15%  Tetracaine    Laser energy settings:    Right Eye:  2.0 energy per shot  155 bursts  1 to 1 ratio per shot     Left Eye:  2.0 energy per shot  97 bursts  1 to 1 ratio per shot     Central capsular opening created without difficulty      2. Pseudophakia of both eyes  Z96.1 V43.1 See above          Return to clinic with TRF 4-6 weeks for YAG PO

## 2024-01-24 ENCOUNTER — OFFICE VISIT (OUTPATIENT)
Dept: URGENT CARE | Facility: CLINIC | Age: 74
End: 2024-01-24
Payer: MEDICARE

## 2024-01-24 VITALS
WEIGHT: 125 LBS | SYSTOLIC BLOOD PRESSURE: 144 MMHG | TEMPERATURE: 101 F | HEART RATE: 98 BPM | HEIGHT: 63 IN | OXYGEN SATURATION: 98 % | DIASTOLIC BLOOD PRESSURE: 78 MMHG | RESPIRATION RATE: 20 BRPM | BODY MASS INDEX: 22.15 KG/M2

## 2024-01-24 DIAGNOSIS — J10.1 INFLUENZA A: ICD-10-CM

## 2024-01-24 DIAGNOSIS — R50.9 FEVER, UNSPECIFIED FEVER CAUSE: Primary | ICD-10-CM

## 2024-01-24 DIAGNOSIS — J45.901 EXACERBATION OF ASTHMA, UNSPECIFIED ASTHMA SEVERITY, UNSPECIFIED WHETHER PERSISTENT: ICD-10-CM

## 2024-01-24 LAB
CTP QC/QA: YES
CTP QC/QA: YES
POC MOLECULAR INFLUENZA A AGN: POSITIVE
POC MOLECULAR INFLUENZA B AGN: NEGATIVE
SARS-COV-2 AG RESP QL IA.RAPID: NEGATIVE

## 2024-01-24 PROCEDURE — 99213 OFFICE O/P EST LOW 20 MIN: CPT | Mod: S$GLB,,, | Performed by: INTERNAL MEDICINE

## 2024-01-24 PROCEDURE — 87502 INFLUENZA DNA AMP PROBE: CPT | Mod: QW,S$GLB,, | Performed by: INTERNAL MEDICINE

## 2024-01-24 PROCEDURE — 87811 SARS-COV-2 COVID19 W/OPTIC: CPT | Mod: QW,S$GLB,, | Performed by: INTERNAL MEDICINE

## 2024-01-24 RX ORDER — OSELTAMIVIR PHOSPHATE 75 MG/1
75 CAPSULE ORAL 2 TIMES DAILY
Qty: 10 CAPSULE | Refills: 0 | Status: SHIPPED | OUTPATIENT
Start: 2024-01-24 | End: 2024-01-29

## 2024-01-24 RX ORDER — PREDNISONE 10 MG/1
30 TABLET ORAL DAILY
Qty: 15 TABLET | Refills: 0 | Status: SHIPPED | OUTPATIENT
Start: 2024-01-24 | End: 2024-01-29

## 2024-01-24 NOTE — PROGRESS NOTES
"Subjective:      Patient ID: Cindy Ramirez is a 73 y.o. female.    Vitals:  height is 5' 3" (1.6 m) and weight is 56.7 kg (125 lb). Her oral temperature is 100.7 °F (38.2 °C) (abnormal). Her blood pressure is 144/78 (abnormal) and her pulse is 98. Her respiration is 20 and oxygen saturation is 98%.     Chief Complaint: Fever    Fever, cough, congestion, sore throat that started yesterday  Patient did take 4 baby aspirin around 4 pm today   Hx of asthma, has been using her inhaler more     Other  This is a new problem. The current episode started yesterday. The problem occurs constantly. The problem has been gradually worsening. Associated symptoms include congestion, coughing, a fever (102), headaches and a sore throat. Treatments tried: aspirin. The treatment provided no relief.       Constitution: Positive for fever (102).   HENT:  Positive for congestion and sore throat.    Respiratory:  Positive for cough.    Neurological:  Positive for headaches.      Objective:     Physical Exam   Constitutional: She is oriented to person, place, and time. She appears well-developed. She is cooperative.  Non-toxic appearance. She does not appear ill. No distress.   HENT:   Head: Normocephalic and atraumatic.   Ears:   Right Ear: Hearing, tympanic membrane, external ear and ear canal normal.   Left Ear: Hearing, tympanic membrane, external ear and ear canal normal.   Nose: Rhinorrhea and congestion present. No mucosal edema or nasal deformity. No epistaxis. Right sinus exhibits no maxillary sinus tenderness and no frontal sinus tenderness. Left sinus exhibits no maxillary sinus tenderness and no frontal sinus tenderness.   Mouth/Throat: Uvula is midline, oropharynx is clear and moist and mucous membranes are normal. No trismus in the jaw. Normal dentition. No uvula swelling. No oropharyngeal exudate, posterior oropharyngeal edema or posterior oropharyngeal erythema.   Eyes: Conjunctivae and lids are normal. No scleral " icterus.   Neck: Trachea normal and phonation normal. Neck supple. No edema present. No erythema present. No neck rigidity present.   Cardiovascular: Normal rate, regular rhythm, normal heart sounds and normal pulses.   Pulmonary/Chest: Effort normal. No respiratory distress. She has no decreased breath sounds. She has wheezes. She has no rhonchi.   Abdominal: Normal appearance.   Musculoskeletal: Normal range of motion.         General: No deformity. Normal range of motion.   Neurological: She is alert and oriented to person, place, and time. She exhibits normal muscle tone. Coordination normal.   Skin: Skin is warm, dry, intact, not diaphoretic and not pale.   Psychiatric: Her speech is normal and behavior is normal. Judgment and thought content normal.   Nursing note and vitals reviewed.      Assessment:     1. Fever, unspecified fever cause    2. Exacerbation of asthma, unspecified asthma severity, unspecified whether persistent    3. Influenza A        Plan:       Fever, unspecified fever cause  -     SARS Coronavirus 2 Antigen, POCT Manual Read    Exacerbation of asthma, unspecified asthma severity, unspecified whether persistent  -     predniSONE (DELTASONE) 10 MG tablet; Take 3 tablets (30 mg total) by mouth once daily. for 5 days  Dispense: 15 tablet; Refill: 0    Influenza A  -     oseltamivir (TAMIFLU) 75 MG capsule; Take 1 capsule (75 mg total) by mouth 2 (two) times daily. for 5 days  Dispense: 10 capsule; Refill: 0        Patient Instructions   If your condition worsens we recommend that you receive another evaluation at the emergency room immediately or contact your primary medical clinics after hours call service to discuss your concerns. You must understand that you've received an Urgent Care treatment only and that you may be released before all of your medical problems are known or treated. You, the patient, will arrange for follow up care as instructed.  Drink plenty of Fluids  Wash hands  frequently using mild antibacterial soap lathering for at least 15 seconds then rinse  Get plenty of Rest  Salt water gargles  Follow up in 1-2 weeks with Primary Care physician if not significantly better.   If you are not allergic please Tylenol every 4-6 hours as needed and/or Ibuprofen every 6-8 hours as needed, over the counter for pain or fever.  Take OTC Cough/Congestion medicine as needed. Talk to your pharmacist about the best option for you.

## 2024-01-31 ENCOUNTER — PATIENT MESSAGE (OUTPATIENT)
Dept: OPHTHALMOLOGY | Facility: CLINIC | Age: 74
End: 2024-01-31

## 2024-01-31 ENCOUNTER — OFFICE VISIT (OUTPATIENT)
Dept: OPHTHALMOLOGY | Facility: CLINIC | Age: 74
End: 2024-01-31
Payer: MEDICARE

## 2024-01-31 DIAGNOSIS — Z96.1 PSEUDOPHAKIA OF BOTH EYES: Primary | ICD-10-CM

## 2024-01-31 PROBLEM — H26.493 PCO (POSTERIOR CAPSULAR OPACIFICATION), BILATERAL: Status: ACTIVE | Noted: 2024-01-31

## 2024-01-31 PROCEDURE — 99212 OFFICE O/P EST SF 10 MIN: CPT | Mod: PBBFAC | Performed by: OPTOMETRIST

## 2024-01-31 PROCEDURE — 99999 PR PBB SHADOW E&M-EST. PATIENT-LVL II: CPT | Mod: PBBFAC,,, | Performed by: OPTOMETRIST

## 2024-01-31 PROCEDURE — 99024 POSTOP FOLLOW-UP VISIT: CPT | Mod: POP,,, | Performed by: OPTOMETRIST

## 2024-02-21 DIAGNOSIS — J45.909 MODERATE ASTHMA WITHOUT COMPLICATION, UNSPECIFIED WHETHER PERSISTENT: ICD-10-CM

## 2024-02-21 DIAGNOSIS — I10 ESSENTIAL HYPERTENSION: ICD-10-CM

## 2024-02-21 RX ORDER — LISINOPRIL 30 MG/1
30 TABLET ORAL DAILY
Qty: 30 TABLET | Refills: 0 | Status: SHIPPED | OUTPATIENT
Start: 2024-02-21 | End: 2024-06-05 | Stop reason: SDUPTHER

## 2024-02-21 RX ORDER — ALBUTEROL SULFATE 90 UG/1
2 AEROSOL, METERED RESPIRATORY (INHALATION) EVERY 4 HOURS PRN
Qty: 18 G | Refills: 0 | Status: SHIPPED | OUTPATIENT
Start: 2024-02-21 | End: 2024-06-05 | Stop reason: SDUPTHER

## 2024-02-21 NOTE — TELEPHONE ENCOUNTER
No care due was identified.  Health [facility] Embedded Care Due Messages. Reference number: [id number].   2/21/2024 10:51:15 AM CST

## 2024-02-21 NOTE — TELEPHONE ENCOUNTER
Refill Routing Note   Medication(s) are not appropriate for processing by [facility] for the following reason(s):        Required vitals abnormal: BP (!) 144/78     [facility] action(s):  Defer  Approve      Medication Therapy Plan:         Appointments  past 12m or future 3m with PCP    Date Provider   Last Visit   8/7/2023 Neli Calderon MD   Next Visit   Visit date not found Neli Calderon MD   ED visits in past 90 days: 0        Note composed:11:36 AM 02/21/2024

## 2024-02-22 NOTE — TELEPHONE ENCOUNTER
One-time refill request approved - patient will need to schedule clinic appointment prior to additional medication refills.     Neli Calderon MD  [facility]  Office: [phone]   Fax: [phone]  02/21/2024

## 2024-04-30 DIAGNOSIS — Z00.00 ENCOUNTER FOR MEDICARE ANNUAL WELLNESS EXAM: ICD-10-CM

## 2024-05-15 DIAGNOSIS — Z78.0 MENOPAUSE: ICD-10-CM

## 2024-05-21 ENCOUNTER — PATIENT OUTREACH (OUTPATIENT)
Dept: ADMINISTRATIVE | Facility: HOSPITAL | Age: 74
End: 2024-05-21
Payer: MEDICARE

## 2024-05-21 DIAGNOSIS — Z12.31 ENCOUNTER FOR SCREENING MAMMOGRAM FOR MALIGNANT NEOPLASM OF BREAST: Primary | ICD-10-CM

## 2024-05-21 NOTE — PROGRESS NOTES
Population Health Chart Review & Patient Outreach Details      Additional [facility] Health Notes:               Updates Requested / Reviewed:      Updated Care Coordination Note, Care Everywhere, , External Sources: DIS, Removed  or Duplicate Orders, and Immunizations Reconciliation Completed or Queried: [facility] Topics Overdue:      [facility] Score: 3     Osteoporosis Screening  Mammogram  Uncontrolled BP    Pneumonia Vaccine  Shingles/Zoster Vaccine  RSV Vaccine                  Health Maintenance Topic(s) Outreach Outcomes & Actions Taken:    Breast Cancer Screening - Outreach Outcomes & Actions Taken  : Mammogram Order Placed

## 2024-05-31 NOTE — TELEPHONE ENCOUNTER
No care due was identified.  [facility] Embedded Care Due Messages. Reference number: [id number].   8/09/2023 6:21:00 AM CDT   No

## 2024-06-05 ENCOUNTER — OFFICE VISIT (OUTPATIENT)
Dept: FAMILY MEDICINE | Facility: CLINIC | Age: 74
End: 2024-06-05
Payer: MEDICARE

## 2024-06-05 VITALS
RESPIRATION RATE: 18 BRPM | WEIGHT: 119.19 LBS | SYSTOLIC BLOOD PRESSURE: 126 MMHG | BODY MASS INDEX: 21.11 KG/M2 | OXYGEN SATURATION: 99 % | HEART RATE: 69 BPM | DIASTOLIC BLOOD PRESSURE: 72 MMHG

## 2024-06-05 DIAGNOSIS — J44.89 ASTHMA-COPD OVERLAP SYNDROME: Primary | Chronic | ICD-10-CM

## 2024-06-05 DIAGNOSIS — M81.0 POSTMENOPAUSAL BONE LOSS: ICD-10-CM

## 2024-06-05 DIAGNOSIS — M85.852 OSTEOPENIA OF LEFT HIP: Chronic | ICD-10-CM

## 2024-06-05 DIAGNOSIS — Z13.1 ENCOUNTER FOR SCREENING FOR DIABETES MELLITUS: ICD-10-CM

## 2024-06-05 DIAGNOSIS — I10 PRIMARY HYPERTENSION: Chronic | ICD-10-CM

## 2024-06-05 DIAGNOSIS — R79.9 ABNORMAL FINDING OF BLOOD CHEMISTRY, UNSPECIFIED: ICD-10-CM

## 2024-06-05 DIAGNOSIS — Z13.6 ENCOUNTER FOR SCREENING FOR CARDIOVASCULAR DISORDERS: ICD-10-CM

## 2024-06-05 PROBLEM — J38.01 PARESIS OF RIGHT VOCAL CORD: Chronic | Status: ACTIVE | Noted: 2023-08-07

## 2024-06-05 PROBLEM — B35.1 ONYCHOMYCOSIS: Status: RESOLVED | Noted: 2023-06-08 | Resolved: 2024-06-05

## 2024-06-05 PROBLEM — D69.2 SENILE PURPURA: Status: RESOLVED | Noted: 2023-04-26 | Resolved: 2024-06-05

## 2024-06-05 PROBLEM — J45.901 ASTHMA EXACERBATION: Status: RESOLVED | Noted: 2024-01-24 | Resolved: 2024-06-05

## 2024-06-05 PROBLEM — R49.0 HOARSENESS, PERSISTENT: Chronic | Status: ACTIVE | Noted: 2023-06-08

## 2024-06-05 PROBLEM — J10.1 INFLUENZA A: Status: RESOLVED | Noted: 2024-01-24 | Resolved: 2024-06-05

## 2024-06-05 PROBLEM — H26.493 PCO (POSTERIOR CAPSULAR OPACIFICATION), BILATERAL: Chronic | Status: ACTIVE | Noted: 2024-01-31

## 2024-06-05 PROCEDURE — 99214 OFFICE O/P EST MOD 30 MIN: CPT | Mod: S$PBB,,, | Performed by: STUDENT IN AN ORGANIZED HEALTH CARE EDUCATION/TRAINING PROGRAM

## 2024-06-05 PROCEDURE — 99213 OFFICE O/P EST LOW 20 MIN: CPT | Mod: PBBFAC,PN,25 | Performed by: STUDENT IN AN ORGANIZED HEALTH CARE EDUCATION/TRAINING PROGRAM

## 2024-06-05 PROCEDURE — 99999 PR PBB SHADOW E&M-EST. PATIENT-LVL III: CPT | Mod: PBBFAC,,, | Performed by: STUDENT IN AN ORGANIZED HEALTH CARE EDUCATION/TRAINING PROGRAM

## 2024-06-05 PROCEDURE — 99999PBSHW PR PBB SHADOW TECHNICAL ONLY FILED TO HB: Mod: PBBFAC,,,

## 2024-06-05 PROCEDURE — 94640 AIRWAY INHALATION TREATMENT: CPT | Mod: PBBFAC,PN

## 2024-06-05 RX ORDER — ALBUTEROL SULFATE 90 UG/1
2 AEROSOL, METERED RESPIRATORY (INHALATION) EVERY 4 HOURS PRN
Qty: 18 G | Refills: 5 | Status: SHIPPED | OUTPATIENT
Start: 2024-06-05 | End: 2025-05-31

## 2024-06-05 RX ORDER — PREDNISONE 20 MG/1
40 TABLET ORAL DAILY
Qty: 10 TABLET | Refills: 0 | Status: SHIPPED | OUTPATIENT
Start: 2024-06-05 | End: 2024-06-10

## 2024-06-05 RX ORDER — IPRATROPIUM BROMIDE AND ALBUTEROL SULFATE 2.5; .5 MG/3ML; MG/3ML
3 SOLUTION RESPIRATORY (INHALATION)
Status: COMPLETED | OUTPATIENT
Start: 2024-06-05 | End: 2024-06-05

## 2024-06-05 RX ORDER — IPRATROPIUM BROMIDE AND ALBUTEROL SULFATE 2.5; .5 MG/3ML; MG/3ML
3 SOLUTION RESPIRATORY (INHALATION) EVERY 6 HOURS PRN
Qty: 75 ML | Refills: 0 | Status: SHIPPED | OUTPATIENT
Start: 2024-06-05 | End: 2025-06-05

## 2024-06-05 RX ORDER — AZITHROMYCIN 500 MG/1
500 TABLET, FILM COATED ORAL DAILY
Qty: 3 TABLET | Refills: 0 | Status: SHIPPED | OUTPATIENT
Start: 2024-06-05 | End: 2024-06-08

## 2024-06-05 RX ORDER — LISINOPRIL 30 MG/1
30 TABLET ORAL DAILY
Qty: 90 TABLET | Refills: 3 | Status: SHIPPED | OUTPATIENT
Start: 2024-06-05 | End: 2025-05-31

## 2024-06-05 RX ADMIN — IPRATROPIUM BROMIDE AND ALBUTEROL SULFATE 3 ML: .5; 3 SOLUTION RESPIRATORY (INHALATION) at 04:06

## 2024-06-05 NOTE — PROGRESS NOTES
Plan:      Cindy was seen today for annual exam.    Diagnoses and all orders for this visit:    Asthma-COPD overlap syndrome  -     albuterol-ipratropium 2.5 mg-0.5 mg/3 mL nebulizer solution 3 mL  -     umeclidinium-vilanteroL (ANORO ELLIPTA) 62.5-25 mcg/actuation DsDv; Inhale 1 puff into the lungs Daily. Controller.  -     CBC Auto Differential; Future  -     albuterol (PROAIR HFA) 90 mcg/actuation inhaler; Inhale 2 puffs into the lungs every 4 (four) hours as needed for Wheezing or Shortness of Breath. Rescue  -     predniSONE (DELTASONE) 20 MG tablet; Take 2 tablets (40 mg total) by mouth once daily. for 5 days  -     azithromycin (ZITHROMAX) 500 MG tablet; Take 1 tablet (500 mg total) by mouth once daily. for 3 days  -     NEBULIZER FOR HOME USE  -     albuterol-ipratropium (DUO-NEB) 2.5 mg-0.5 mg/3 mL nebulizer solution; Take 3 mLs by nebulization every 6 (six) hours as needed for Wheezing or Shortness of Breath. Rescue. RINSE MOUTH AFTER USE.    Primary hypertension  -     Comprehensive Metabolic Panel; Future  -     lisinopriL (PRINIVIL,ZESTRIL) 30 MG tablet; Take 1 tablet (30 mg total) by mouth once daily.    Encounter for screening for cardiovascular disorders  -     Lipid Panel; Future    Osteopenia of left hip  -     Comprehensive Metabolic Panel; Future  -     Vitamin D; Future    Postmenopausal bone loss  -     Vitamin D; Future    Encounter for screening for diabetes mellitus  -     Hemoglobin A1C; Future    Abnormal finding of blood chemistry, unspecified  -     Hemoglobin A1C; Future      Follow up in about 4 weeks (around 7/3/2024), or if symptoms worsen or fail to improve.    Neli Calderon MD  06/05/2024    Subjective:      Patient ID: Cindy Ramirez is a 74 y.o. female    Chief Complaint   Patient presents with    Annual Exam     HPI  74 y.o. female with a PMHx as documented below presents to clinic today for the following:    Pt reports 1 day history of increased wheezing requiring  frequent rescue inhaler use. No associated fever, chills, sinus congestion, sore throat.    COPD/asthma overlap syndrome:   - Trelegy 200-62.5-25 mch 1 puff daily - unable to tolerate 2/2 recurrent oral thrush despite rinsing mouth after each use  - Albuterol PRN     HTN:   - Lisinopril 30 mg daily      Osteopenia:   - DEXA (4/13/22) w/ osteopenia of the left hip, normal bone mineral density of the lumbar spine  - Daily calcium + vit D supplementation    ROS  Constitutional:  Negative for chills and fever.   Respiratory:  Negative for shortness of breath.    Cardiovascular:  Negative for chest pain.   Gastrointestinal:  Negative for abdominal pain, constipation, diarrhea, nausea and vomiting.     Current Outpatient Medications   Medication Instructions    albuterol (PROAIR HFA) 90 mcg/actuation inhaler 2 puffs, Inhalation, Every 4 hours PRN, Rescue    albuterol-ipratropium (DUO-NEB) 2.5 mg-0.5 mg/3 mL nebulizer solution 3 mLs, Nebulization, Every 6 hours PRN, Rescue. RINSE MOUTH AFTER USE.    calcium-vitamin D3 (OS-SHABANA 500 + D3) 500 mg-5 mcg (200 unit) per tablet 1 tablet, Oral, 2 times daily with meals    lisinopriL (PRINIVIL,ZESTRIL) 30 mg, Oral, Daily    multivitamin capsule 1 capsule, Oral, Daily    prednisoLONE acetate (PRED FORTE) 1 % DrpS 1 drop, Both Eyes, 4 times daily    umeclidinium-vilanteroL (ANORO ELLIPTA) 62.5-25 mcg/actuation DsDv 1 puff, Inhalation, Daily, Controller.      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    Physical Exam   Constitutional:       General: No acute distress.  HENT:      Head: Normocephalic and atraumatic.   Pulmonary:      Effort: Pulmonary effort is normal. No respiratory distress.   Neurological:      General: No focal deficit present.      Mental Status: Alert and oriented to person, place, and time. Mental status is at baseline.    Assessment:       1. Asthma-COPD overlap syndrome    2. Primary hypertension    3. Encounter for screening for cardiovascular disorders    4. Osteopenia of left hip    5. Postmenopausal bone loss    6. Encounter for screening for diabetes mellitus    7. Abnormal finding of blood chemistry, unspecified        Neli Calderon MD  [facility]  Office: [phone]   Fax: [phone]  06/05/2024      Disclaimer: This note was partly generated using dictation software which may occasionally result in transcription errors.    Total time spent on this encounter includes face to face time and non-face to face time preparing to see the patient (eg, review of tests), obtaining and/or reviewing separately obtained history, documenting clinical information in the electronic or other health record, independently interpreting results, and communicating results to the patient/family/caregiver, or care coordinator.

## 2024-06-17 ENCOUNTER — HOSPITAL ENCOUNTER (OUTPATIENT)
Dept: RADIOLOGY | Facility: HOSPITAL | Age: 74
Discharge: HOME | End: 2024-06-17
Attending: STUDENT IN AN ORGANIZED HEALTH CARE EDUCATION/TRAINING PROGRAM
Payer: MEDICARE

## 2024-06-17 DIAGNOSIS — Z12.31 ENCOUNTER FOR SCREENING MAMMOGRAM FOR MALIGNANT NEOPLASM OF BREAST: ICD-10-CM

## 2024-06-17 DIAGNOSIS — Z78.0 MENOPAUSE: ICD-10-CM

## 2024-06-17 PROCEDURE — 77080 DXA BONE DENSITY AXIAL: CPT | Mod: 26,,, | Performed by: RADIOLOGY

## 2024-06-17 PROCEDURE — 77080 DXA BONE DENSITY AXIAL: CPT | Mod: TC,PO

## 2024-06-17 PROCEDURE — 77063 BREAST TOMOSYNTHESIS BI: CPT | Mod: 26,,, | Performed by: RADIOLOGY

## 2024-06-17 PROCEDURE — 77067 SCR MAMMO BI INCL CAD: CPT | Mod: TC,PO

## 2024-06-17 PROCEDURE — 77067 SCR MAMMO BI INCL CAD: CPT | Mod: 26,,, | Performed by: RADIOLOGY

## 2024-06-24 ENCOUNTER — LAB VISIT (OUTPATIENT)
Dept: LAB | Facility: HOSPITAL | Age: 74
End: 2024-06-24
Attending: STUDENT IN AN ORGANIZED HEALTH CARE EDUCATION/TRAINING PROGRAM
Payer: MEDICARE

## 2024-06-24 DIAGNOSIS — M81.0 POSTMENOPAUSAL BONE LOSS: ICD-10-CM

## 2024-06-24 DIAGNOSIS — Z13.1 ENCOUNTER FOR SCREENING FOR DIABETES MELLITUS: ICD-10-CM

## 2024-06-24 DIAGNOSIS — M85.852 OSTEOPENIA OF LEFT HIP: Chronic | ICD-10-CM

## 2024-06-24 DIAGNOSIS — J44.89 ASTHMA-COPD OVERLAP SYNDROME: Chronic | ICD-10-CM

## 2024-06-24 DIAGNOSIS — I10 PRIMARY HYPERTENSION: Chronic | ICD-10-CM

## 2024-06-24 DIAGNOSIS — Z13.6 ENCOUNTER FOR SCREENING FOR CARDIOVASCULAR DISORDERS: ICD-10-CM

## 2024-06-24 DIAGNOSIS — R79.9 ABNORMAL FINDING OF BLOOD CHEMISTRY, UNSPECIFIED: ICD-10-CM

## 2024-06-24 LAB
25(OH)D3+25(OH)D2 SERPL-MCNC: 70 NG/ML (ref 30–96)
ALBUMIN SERPL BCP-MCNC: 3.9 G/DL (ref 3.5–5.2)
ALP SERPL-CCNC: 66 U/L (ref 55–135)
ALT SERPL W/O P-5'-P-CCNC: 14 U/L (ref 10–44)
ANION GAP SERPL CALC-SCNC: 7 MMOL/L (ref 8–16)
AST SERPL-CCNC: 22 U/L (ref 10–40)
BASOPHILS # BLD AUTO: 0.09 K/UL (ref 0–0.2)
BASOPHILS NFR BLD: 1.4 % (ref 0–1.9)
BILIRUB SERPL-MCNC: 0.7 MG/DL (ref 0.1–1)
BUN SERPL-MCNC: 12 MG/DL (ref 8–23)
CALCIUM SERPL-MCNC: 10.1 MG/DL (ref 8.7–10.5)
CHLORIDE SERPL-SCNC: 105 MMOL/L (ref 95–110)
CHOLEST SERPL-MCNC: 214 MG/DL (ref 120–199)
CHOLEST/HDLC SERPL: 2.1 {RATIO} (ref 2–5)
CO2 SERPL-SCNC: 27 MMOL/L (ref 23–29)
CREAT SERPL-MCNC: 0.8 MG/DL (ref 0.5–1.4)
DIFFERENTIAL METHOD BLD: ABNORMAL
EOSINOPHIL # BLD AUTO: 0.2 K/UL (ref 0–0.5)
EOSINOPHIL NFR BLD: 2.7 % (ref 0–8)
ERYTHROCYTE [DISTWIDTH] IN BLOOD BY AUTOMATED COUNT: 12.9 % (ref 11.5–14.5)
EST. GFR  (NO RACE VARIABLE): >60 ML/MIN/1.73 M^2
ESTIMATED AVG GLUCOSE: 100 MG/DL (ref 68–131)
GLUCOSE SERPL-MCNC: 74 MG/DL (ref 70–110)
HBA1C MFR BLD: 5.1 % (ref 4–5.6)
HCT VFR BLD AUTO: 45.8 % (ref 37–48.5)
HDLC SERPL-MCNC: 104 MG/DL (ref 40–75)
HDLC SERPL: 48.6 % (ref 20–50)
HGB BLD-MCNC: 15.2 G/DL (ref 12–16)
IMM GRANULOCYTES # BLD AUTO: 0.02 K/UL (ref 0–0.04)
IMM GRANULOCYTES NFR BLD AUTO: 0.3 % (ref 0–0.5)
LDLC SERPL CALC-MCNC: 95.2 MG/DL (ref 63–159)
LYMPHOCYTES # BLD AUTO: 1.1 K/UL (ref 1–4.8)
LYMPHOCYTES NFR BLD: 17.5 % (ref 18–48)
MCH RBC QN AUTO: 31.9 PG (ref 27–31)
MCHC RBC AUTO-ENTMCNC: 33.2 G/DL (ref 32–36)
MCV RBC AUTO: 96 FL (ref 82–98)
MONOCYTES # BLD AUTO: 0.5 K/UL (ref 0.3–1)
MONOCYTES NFR BLD: 7.2 % (ref 4–15)
NEUTROPHILS # BLD AUTO: 4.5 K/UL (ref 1.8–7.7)
NEUTROPHILS NFR BLD: 70.9 % (ref 38–73)
NONHDLC SERPL-MCNC: 110 MG/DL
NRBC BLD-RTO: 0 /100 WBC
PLATELET # BLD AUTO: 245 K/UL (ref 150–450)
PMV BLD AUTO: 10 FL (ref 9.2–12.9)
POTASSIUM SERPL-SCNC: 5.2 MMOL/L (ref 3.5–5.1)
PROT SERPL-MCNC: 7 G/DL (ref 6–8.4)
RBC # BLD AUTO: 4.77 M/UL (ref 4–5.4)
SODIUM SERPL-SCNC: 139 MMOL/L (ref 136–145)
TRIGL SERPL-MCNC: 74 MG/DL (ref 30–150)
WBC # BLD AUTO: 6.39 K/UL (ref 3.9–12.7)

## 2024-06-24 PROCEDURE — 80053 COMPREHEN METABOLIC PANEL: CPT | Performed by: STUDENT IN AN ORGANIZED HEALTH CARE EDUCATION/TRAINING PROGRAM

## 2024-06-24 PROCEDURE — 80061 LIPID PANEL: CPT | Performed by: STUDENT IN AN ORGANIZED HEALTH CARE EDUCATION/TRAINING PROGRAM

## 2024-06-24 PROCEDURE — 83036 HEMOGLOBIN GLYCOSYLATED A1C: CPT | Performed by: STUDENT IN AN ORGANIZED HEALTH CARE EDUCATION/TRAINING PROGRAM

## 2024-06-24 PROCEDURE — 82306 VITAMIN D 25 HYDROXY: CPT | Performed by: STUDENT IN AN ORGANIZED HEALTH CARE EDUCATION/TRAINING PROGRAM

## 2024-06-24 PROCEDURE — 36415 COLL VENOUS BLD VENIPUNCTURE: CPT | Mod: PN | Performed by: STUDENT IN AN ORGANIZED HEALTH CARE EDUCATION/TRAINING PROGRAM

## 2024-06-24 PROCEDURE — 85025 COMPLETE CBC W/AUTO DIFF WBC: CPT | Performed by: STUDENT IN AN ORGANIZED HEALTH CARE EDUCATION/TRAINING PROGRAM

## 2024-06-26 ENCOUNTER — PATIENT MESSAGE (OUTPATIENT)
Dept: FAMILY MEDICINE | Facility: CLINIC | Age: 74
End: 2024-06-26
Payer: MEDICARE

## 2024-06-26 DIAGNOSIS — E87.5 HYPERKALEMIA: Primary | ICD-10-CM

## 2024-06-26 NOTE — PROGRESS NOTES
Diagnoses and all orders for this visit:    Hyperkalemia  -     Comprehensive Metabolic Panel; Future        Neli Calderon MD  [facility]  Office: [phone]   Fax: [phone]  06/26/2024

## 2024-07-02 ENCOUNTER — LAB VISIT (OUTPATIENT)
Dept: LAB | Facility: HOSPITAL | Age: 74
End: 2024-07-02
Attending: STUDENT IN AN ORGANIZED HEALTH CARE EDUCATION/TRAINING PROGRAM
Payer: MEDICARE

## 2024-07-02 DIAGNOSIS — E87.5 HYPERKALEMIA: ICD-10-CM

## 2024-07-02 LAB
ALBUMIN SERPL BCP-MCNC: 3.7 G/DL (ref 3.5–5.2)
ALP SERPL-CCNC: 64 U/L (ref 55–135)
ALT SERPL W/O P-5'-P-CCNC: 13 U/L (ref 10–44)
ANION GAP SERPL CALC-SCNC: 10 MMOL/L (ref 8–16)
AST SERPL-CCNC: 17 U/L (ref 10–40)
BILIRUB SERPL-MCNC: 0.3 MG/DL (ref 0.1–1)
BUN SERPL-MCNC: 18 MG/DL (ref 8–23)
CALCIUM SERPL-MCNC: 10.9 MG/DL (ref 8.7–10.5)
CHLORIDE SERPL-SCNC: 105 MMOL/L (ref 95–110)
CO2 SERPL-SCNC: 27 MMOL/L (ref 23–29)
CREAT SERPL-MCNC: 0.8 MG/DL (ref 0.5–1.4)
EST. GFR  (NO RACE VARIABLE): >60 ML/MIN/1.73 M^2
GLUCOSE SERPL-MCNC: 104 MG/DL (ref 70–110)
POTASSIUM SERPL-SCNC: 4.7 MMOL/L (ref 3.5–5.1)
PROT SERPL-MCNC: 6.7 G/DL (ref 6–8.4)
SODIUM SERPL-SCNC: 142 MMOL/L (ref 136–145)

## 2024-07-02 PROCEDURE — 36415 COLL VENOUS BLD VENIPUNCTURE: CPT | Mod: PN | Performed by: STUDENT IN AN ORGANIZED HEALTH CARE EDUCATION/TRAINING PROGRAM

## 2024-07-02 PROCEDURE — 80053 COMPREHEN METABOLIC PANEL: CPT | Performed by: STUDENT IN AN ORGANIZED HEALTH CARE EDUCATION/TRAINING PROGRAM

## 2024-07-03 ENCOUNTER — OFFICE VISIT (OUTPATIENT)
Dept: FAMILY MEDICINE | Facility: CLINIC | Age: 74
End: 2024-07-03
Payer: MEDICARE

## 2024-07-03 ENCOUNTER — LAB VISIT (OUTPATIENT)
Dept: LAB | Facility: HOSPITAL | Age: 74
End: 2024-07-03
Attending: STUDENT IN AN ORGANIZED HEALTH CARE EDUCATION/TRAINING PROGRAM
Payer: MEDICARE

## 2024-07-03 VITALS
DIASTOLIC BLOOD PRESSURE: 70 MMHG | SYSTOLIC BLOOD PRESSURE: 114 MMHG | OXYGEN SATURATION: 98 % | BODY MASS INDEX: 21.58 KG/M2 | HEIGHT: 63 IN | WEIGHT: 121.81 LBS | HEART RATE: 90 BPM

## 2024-07-03 DIAGNOSIS — R05.3 UNEXPLAINED CHRONIC COUGH: Primary | ICD-10-CM

## 2024-07-03 DIAGNOSIS — J98.4 APICAL LUNG SCARRING: ICD-10-CM

## 2024-07-03 DIAGNOSIS — Z71.2 ENCOUNTER TO DISCUSS TEST RESULTS: ICD-10-CM

## 2024-07-03 DIAGNOSIS — E83.52 SERUM CALCIUM ELEVATED: ICD-10-CM

## 2024-07-03 DIAGNOSIS — R05.3 UNEXPLAINED CHRONIC COUGH: ICD-10-CM

## 2024-07-03 DIAGNOSIS — J44.89 ASTHMA-COPD OVERLAP SYNDROME: Chronic | ICD-10-CM

## 2024-07-03 LAB
A1AT SERPL-MCNC: 131 MG/DL (ref 100–190)
ALBUMIN SERPL BCP-MCNC: 3.9 G/DL (ref 3.5–5.2)
ALP SERPL-CCNC: 75 U/L (ref 55–135)
ALT SERPL W/O P-5'-P-CCNC: 13 U/L (ref 10–44)
ANION GAP SERPL CALC-SCNC: 8 MMOL/L (ref 8–16)
AST SERPL-CCNC: 18 U/L (ref 10–40)
BILIRUB SERPL-MCNC: 0.3 MG/DL (ref 0.1–1)
BUN SERPL-MCNC: 18 MG/DL (ref 8–23)
CALCIUM SERPL-MCNC: 10.5 MG/DL (ref 8.7–10.5)
CCP AB SER IA-ACNC: <0.5 U/ML
CHLORIDE SERPL-SCNC: 104 MMOL/L (ref 95–110)
CK SERPL-CCNC: 43 U/L (ref 20–180)
CO2 SERPL-SCNC: 28 MMOL/L (ref 23–29)
CREAT SERPL-MCNC: 0.9 MG/DL (ref 0.5–1.4)
CRP SERPL-MCNC: 2 MG/L (ref 0–8.2)
ERYTHROCYTE [SEDIMENTATION RATE] IN BLOOD BY PHOTOMETRIC METHOD: 14 MM/HR (ref 0–36)
EST. GFR  (NO RACE VARIABLE): >60 ML/MIN/1.73 M^2
GLUCOSE SERPL-MCNC: 97 MG/DL (ref 70–110)
POTASSIUM SERPL-SCNC: 4.6 MMOL/L (ref 3.5–5.1)
PROT SERPL-MCNC: 7.3 G/DL (ref 6–8.4)
RHEUMATOID FACT SERPL-ACNC: <13 IU/ML (ref 0–15)
SODIUM SERPL-SCNC: 140 MMOL/L (ref 136–145)

## 2024-07-03 PROCEDURE — 86235 NUCLEAR ANTIGEN ANTIBODY: CPT | Mod: 59 | Performed by: STUDENT IN AN ORGANIZED HEALTH CARE EDUCATION/TRAINING PROGRAM

## 2024-07-03 PROCEDURE — 80053 COMPREHEN METABOLIC PANEL: CPT | Performed by: STUDENT IN AN ORGANIZED HEALTH CARE EDUCATION/TRAINING PROGRAM

## 2024-07-03 PROCEDURE — 86038 ANTINUCLEAR ANTIBODIES: CPT | Performed by: STUDENT IN AN ORGANIZED HEALTH CARE EDUCATION/TRAINING PROGRAM

## 2024-07-03 PROCEDURE — 86140 C-REACTIVE PROTEIN: CPT | Performed by: STUDENT IN AN ORGANIZED HEALTH CARE EDUCATION/TRAINING PROGRAM

## 2024-07-03 PROCEDURE — 86225 DNA ANTIBODY NATIVE: CPT | Performed by: STUDENT IN AN ORGANIZED HEALTH CARE EDUCATION/TRAINING PROGRAM

## 2024-07-03 PROCEDURE — 82550 ASSAY OF CK (CPK): CPT | Performed by: STUDENT IN AN ORGANIZED HEALTH CARE EDUCATION/TRAINING PROGRAM

## 2024-07-03 PROCEDURE — 86147 CARDIOLIPIN ANTIBODY EA IG: CPT | Mod: 59 | Performed by: STUDENT IN AN ORGANIZED HEALTH CARE EDUCATION/TRAINING PROGRAM

## 2024-07-03 PROCEDURE — 86431 RHEUMATOID FACTOR QUANT: CPT | Performed by: STUDENT IN AN ORGANIZED HEALTH CARE EDUCATION/TRAINING PROGRAM

## 2024-07-03 PROCEDURE — 99214 OFFICE O/P EST MOD 30 MIN: CPT | Mod: PBBFAC,PN | Performed by: STUDENT IN AN ORGANIZED HEALTH CARE EDUCATION/TRAINING PROGRAM

## 2024-07-03 PROCEDURE — 85652 RBC SED RATE AUTOMATED: CPT | Performed by: STUDENT IN AN ORGANIZED HEALTH CARE EDUCATION/TRAINING PROGRAM

## 2024-07-03 PROCEDURE — 86036 ANCA SCREEN EACH ANTIBODY: CPT | Mod: 59 | Performed by: STUDENT IN AN ORGANIZED HEALTH CARE EDUCATION/TRAINING PROGRAM

## 2024-07-03 PROCEDURE — 86235 NUCLEAR ANTIGEN ANTIBODY: CPT | Performed by: STUDENT IN AN ORGANIZED HEALTH CARE EDUCATION/TRAINING PROGRAM

## 2024-07-03 PROCEDURE — 83516 IMMUNOASSAY NONANTIBODY: CPT | Performed by: STUDENT IN AN ORGANIZED HEALTH CARE EDUCATION/TRAINING PROGRAM

## 2024-07-03 PROCEDURE — 86200 CCP ANTIBODY: CPT | Performed by: STUDENT IN AN ORGANIZED HEALTH CARE EDUCATION/TRAINING PROGRAM

## 2024-07-03 PROCEDURE — 82085 ASSAY OF ALDOLASE: CPT | Performed by: STUDENT IN AN ORGANIZED HEALTH CARE EDUCATION/TRAINING PROGRAM

## 2024-07-03 PROCEDURE — 99999 PR PBB SHADOW E&M-EST. PATIENT-LVL IV: CPT | Mod: PBBFAC,,, | Performed by: STUDENT IN AN ORGANIZED HEALTH CARE EDUCATION/TRAINING PROGRAM

## 2024-07-03 NOTE — PROGRESS NOTES
Plan:      Cindy was seen today for follow-up.    Diagnoses and all orders for this visit:    Unexplained chronic cough  -     Ambulatory referral/consult to Pulmonology; Future  -     CT Chest Without Contrast; Future  -     ALPHA-1-ANTITRYPSIN; Future  -     C-Reactive Protein; Future  -     Sedimentation rate; Future  -     Rheumatoid Factor; Future  -     Cyclic Citrullinated Peptide Antibody, IgG; Future  -     YOVANI Screen w/Reflex; Future  -     Anti-Bobbi 1 Antibody, IgG; Future  -     Anti Sm/RNP Antibody; Future  -     Ribosomal P Antibody, KULDIP; Future  -     Sjogrens syndrome-A extractable nuclear antibody; Future  -     Sjogrens syndrome-B extractable nuclear antibody; Future  -     Anti-Histone Antibody; Future  -     Cardiolipin antibody; Future  -     Anti-DNA Ab, Double-Stranded; Future  -     Anti-Smith Antibody; Future  -     CK; Future  -     ALDOLASE; Future  -     ANTI-NEUTROPHILIC CYTOPLASMIC ANTIBODY; Future  -     ANTI-SCLERODERMA ANTIBODY; Future  -     PM-Scl Antibody; Future    Asthma-COPD overlap syndrome  -     Ambulatory referral/consult to Pulmonology; Future  -     CT Chest Without Contrast; Future    Apical lung scarring  -     Ambulatory referral/consult to Pulmonology; Future  -     CT Chest Without Contrast; Future  -     ALPHA-1-ANTITRYPSIN; Future  -     C-Reactive Protein; Future  -     Sedimentation rate; Future  -     Rheumatoid Factor; Future  -     Cyclic Citrullinated Peptide Antibody, IgG; Future  -     YOVANI Screen w/Reflex; Future  -     Anti-Bobbi 1 Antibody, IgG; Future  -     Anti Sm/RNP Antibody; Future  -     Ribosomal P Antibody, KULDIP; Future  -     Sjogrens syndrome-A extractable nuclear antibody; Future  -     Sjogrens syndrome-B extractable nuclear antibody; Future  -     Anti-Histone Antibody; Future  -     Cardiolipin antibody; Future  -     Anti-DNA Ab, Double-Stranded; Future  -     Anti-Smith Antibody; Future  -     CK; Future  -     ALDOLASE; Future  -      "ANTI-NEUTROPHILIC CYTOPLASMIC ANTIBODY; Future  -     ANTI-SCLERODERMA ANTIBODY; Future  -     PM-Scl Antibody; Future    Serum calcium elevated  -     Comprehensive Metabolic Panel; Future    Encounter to discuss test results    Reviewed most recent test results - all questions answered, pt expressed understanding.    Follow up in about 4 weeks (around 7/31/2024), or if symptoms worsen or fail to improve.    Neli Calderon MD  07/03/2024    Subjective:      Patient ID: Cindy Ramirez is a 74 y.o. female    Chief Complaint   Patient presents with    Follow-up     HPI  74 y.o. female with a PMHx as documented below presents to clinic today for the following:    Changed inhalers at last appt - pt reports some improvement in symptoms, but still needing to use her rescue inhaler/nebulizer daily.     COPD/asthma overlap syndrome:   - Anora Ellipta 62.5-25 mcg/act 1 puff daily   - Albuterol PRN  - Previous Rx: Trelegy 200-62.5-25 mch 1 puff daily - unable to tolerate 2/2 recurrent oral thrush despite rinsing mouth after each use  - NO history of smoking  - Pt reports symptoms started after she received the COVID vaccine (2021)    "Mild biapical pulmonary scarring redemonstrated" on CT soft tissue neck w/ contrast on 7/3/23    Last seen by Pulmonology (Dr. Pérez) on 3/2/23:         HTN:   - Lisinopril 30 mg daily      Osteopenia:   - DEXA (6/17/24) w/ osteopenia of the left hip, normal bone mineral density of the lumbar spine  - Daily calcium + vit D supplementation    ROS  Constitutional:  Negative for chills and fever.   Respiratory:  Negative for shortness of breath.    Cardiovascular:  Negative for chest pain.   Gastrointestinal:  Negative for abdominal pain, constipation, diarrhea, nausea and vomiting.     Current Outpatient Medications   Medication Instructions    albuterol (PROAIR HFA) 90 mcg/actuation inhaler 2 puffs, Inhalation, Every 4 hours PRN, Rescue    albuterol-ipratropium (DUO-NEB) 2.5 mg-0.5 mg/3 mL " "nebulizer solution 3 mLs, Nebulization, Every 6 hours PRN, Rescue. RINSE MOUTH AFTER USE.    calcium-vitamin D3 (OS-SHABANA 500 + D3) 500 mg-5 mcg (200 unit) per tablet 1 tablet, Oral, 2 times daily with meals    lisinopriL (PRINIVIL,ZESTRIL) 30 mg, Oral, Daily    multivitamin capsule 1 capsule, Oral, Daily    prednisoLONE acetate (PRED FORTE) 1 % DrpS 1 drop, Both Eyes, 4 times daily    umeclidinium-vilanteroL (ANORO ELLIPTA) 62.5-25 mcg/actuation DsDv 1 puff, Inhalation, Daily, Controller.      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    Physical Exam  Vitals reviewed.   Constitutional:       General: She is not in acute distress.  HENT:      Head: Normocephalic and atraumatic.   Cardiovascular:      Rate and Rhythm: Normal rate.   Pulmonary:      Effort: Pulmonary effort is normal. No respiratory distress.      Breath sounds: No stridor. No wheezing, rhonchi or rales.      Comments: Pt reports using nebulizer just prior to today's appointment.  Neurological:      General: No focal deficit present.      Mental Status: She is alert and oriented to person, place, and time. Mental status is at baseline.       Assessment:       1. Unexplained chronic cough    2. Asthma-COPD overlap syndrome    3. Apical lung " scarring    4. Serum calcium elevated    5. Encounter to discuss test results        Neli Calderon MD  [facility]  Office: [phone]   Fax: [phone]  07/03/2024      Disclaimer: This note was partly generated using dictation software which may occasionally result in transcription errors.    Total time spent on this encounter includes face to face time and non-face to face time preparing to see the patient (eg, review of tests), obtaining and/or reviewing separately obtained history, documenting clinical information in the electronic or other health record, independently interpreting results, and communicating results to the patient/family/caregiver, or care coordinator.

## 2024-07-05 LAB
ALDOLASE SERPL-CCNC: 3.8 U/L (ref 1.2–7.6)
ANA SER QL IF: NORMAL
DSDNA AB SER-ACNC: NORMAL [IU]/ML

## 2024-07-06 LAB
ENA JO1 IGG SER-ACNC: <0.2 U
HISTONE IGG SER IA-ACNC: 0.5 UNITS (ref 0–0.9)
RIBOSOMAL P IGG SER-ACNC: <0.2 U

## 2024-07-08 LAB
ANCA AB TITR SER IF: NORMAL TITER
ANTI SM ANTIBODY: 0.07 RATIO (ref 0–0.99)
ANTI SM/RNP ANTIBODY: 0.08 RATIO (ref 0–0.99)
ANTI-SM INTERPRETATION: NEGATIVE
ANTI-SM/RNP INTERPRETATION: NEGATIVE
ANTI-SSA ANTIBODY: 0.07 RATIO (ref 0–0.99)
ANTI-SSA INTERPRETATION: NEGATIVE
ANTI-SSB ANTIBODY: 0.06 RATIO (ref 0–0.99)
ANTI-SSB INTERPRETATION: NEGATIVE
P-ANCA TITR SER IF: NORMAL TITER

## 2024-07-09 LAB
CARDIOLIPIN IGG SER IA-ACNC: <9.4 GPL (ref 0–14.99)
CARDIOLIPIN IGM SER IA-ACNC: 17 MPL (ref 0–12.49)
ENA SCL70 AB SER-ACNC: <0.6 U/ML

## 2024-07-10 ENCOUNTER — OFFICE VISIT (OUTPATIENT)
Dept: PULMONOLOGY | Facility: CLINIC | Age: 74
End: 2024-07-10
Payer: MEDICARE

## 2024-07-10 ENCOUNTER — LAB VISIT (OUTPATIENT)
Dept: LAB | Facility: HOSPITAL | Age: 74
End: 2024-07-10
Attending: INTERNAL MEDICINE
Payer: MEDICARE

## 2024-07-10 VITALS
SYSTOLIC BLOOD PRESSURE: 124 MMHG | OXYGEN SATURATION: 98 % | DIASTOLIC BLOOD PRESSURE: 76 MMHG | HEIGHT: 63 IN | BODY MASS INDEX: 21.5 KG/M2 | WEIGHT: 121.31 LBS | HEART RATE: 77 BPM | RESPIRATION RATE: 21 BRPM

## 2024-07-10 DIAGNOSIS — J98.4 APICAL LUNG SCARRING: ICD-10-CM

## 2024-07-10 DIAGNOSIS — B35.1 ONYCHOMYCOSIS: ICD-10-CM

## 2024-07-10 DIAGNOSIS — J45.40 MODERATE PERSISTENT ASTHMA WITHOUT COMPLICATION: Primary | Chronic | ICD-10-CM

## 2024-07-10 DIAGNOSIS — J30.89 NON-SEASONAL ALLERGIC RHINITIS DUE TO OTHER ALLERGIC TRIGGER: ICD-10-CM

## 2024-07-10 DIAGNOSIS — J44.89 ASTHMA-COPD OVERLAP SYNDROME: Chronic | ICD-10-CM

## 2024-07-10 DIAGNOSIS — J44.89 ASTHMA-COPD OVERLAP SYNDROME: Primary | Chronic | ICD-10-CM

## 2024-07-10 DIAGNOSIS — R05.3 UNEXPLAINED CHRONIC COUGH: ICD-10-CM

## 2024-07-10 DIAGNOSIS — R09.02 EXERCISE HYPOXEMIA: ICD-10-CM

## 2024-07-10 LAB
ALBUMIN SERPL BCP-MCNC: 4.2 G/DL (ref 3.5–5.2)
ALP SERPL-CCNC: 86 U/L (ref 55–135)
ALT SERPL W/O P-5'-P-CCNC: 25 U/L (ref 10–44)
ANION GAP SERPL CALC-SCNC: 12 MMOL/L (ref 8–16)
AST SERPL-CCNC: 34 U/L (ref 10–40)
BILIRUB SERPL-MCNC: 0.4 MG/DL (ref 0.1–1)
BUN SERPL-MCNC: 16 MG/DL (ref 8–23)
CALCIUM SERPL-MCNC: 10.4 MG/DL (ref 8.7–10.5)
CHLORIDE SERPL-SCNC: 99 MMOL/L (ref 95–110)
CO2 SERPL-SCNC: 28 MMOL/L (ref 23–29)
CREAT SERPL-MCNC: 0.9 MG/DL (ref 0.5–1.4)
EST. GFR  (NO RACE VARIABLE): >60 ML/MIN/1.73 M^2
GLUCOSE SERPL-MCNC: 88 MG/DL (ref 70–110)
POTASSIUM SERPL-SCNC: 4.4 MMOL/L (ref 3.5–5.1)
PROT SERPL-MCNC: 7.6 G/DL (ref 6–8.4)
SODIUM SERPL-SCNC: 139 MMOL/L (ref 136–145)

## 2024-07-10 PROCEDURE — 99999 PR PBB SHADOW E&M-EST. PATIENT-LVL IV: CPT | Mod: PBBFAC,,, | Performed by: INTERNAL MEDICINE

## 2024-07-10 PROCEDURE — 36415 COLL VENOUS BLD VENIPUNCTURE: CPT | Performed by: INTERNAL MEDICINE

## 2024-07-10 PROCEDURE — 86003 ALLG SPEC IGE CRUDE XTRC EA: CPT | Performed by: INTERNAL MEDICINE

## 2024-07-10 PROCEDURE — 99214 OFFICE O/P EST MOD 30 MIN: CPT | Mod: PBBFAC | Performed by: INTERNAL MEDICINE

## 2024-07-10 PROCEDURE — 86003 ALLG SPEC IGE CRUDE XTRC EA: CPT | Mod: 59 | Performed by: INTERNAL MEDICINE

## 2024-07-10 PROCEDURE — 80053 COMPREHEN METABOLIC PANEL: CPT | Performed by: STUDENT IN AN ORGANIZED HEALTH CARE EDUCATION/TRAINING PROGRAM

## 2024-07-10 RX ORDER — ALBUTEROL SULFATE 90 UG/1
2 AEROSOL, METERED RESPIRATORY (INHALATION) EVERY 4 HOURS PRN
Qty: 18 G | Refills: 11 | Status: SHIPPED | OUTPATIENT
Start: 2024-07-10 | End: 2025-07-05

## 2024-07-10 RX ORDER — IPRATROPIUM BROMIDE AND ALBUTEROL SULFATE 2.5; .5 MG/3ML; MG/3ML
3 SOLUTION RESPIRATORY (INHALATION) EVERY 6 HOURS PRN
Qty: 360 ML | Refills: 11 | Status: SHIPPED | OUTPATIENT
Start: 2024-07-10 | End: 2025-07-10

## 2024-07-10 NOTE — PROGRESS NOTES
Subjective:     Patient ID: Cindy Ramirez is a 74 y.o. female.    Chief Complaint:      HPI 74 y.o. nonsmoker with history asthma, hypertension   Asthma worse in the past.  Cats at home -   Only seen once in Emergency Room for asthma  History of exercise induced asthma    Asthma  She presents for initial evaluation of asthma. The patient has been previously diagnosed with asthma. She was diagnosed with asthma at age 8 y/o  . The patient has never been admitted to the hospital for asthma. The patient is not currently have symptoms / an exacerbation. The patient has been having episodes for approximately  many  years. Symptoms in previous episodes have included dyspnea, non-productive cough, and wheezing, and typically last  a few minutes  minutes. Previous episodes have been triggered by no identifiable factor. Treatments tried during prior episodes include short-acting inhaled beta-adrenergic agonists, which usually provides some relief of symptoms.    Current Disease Severity  The patient is having daytime symptoms throughout the day. The patient is having daytime symptoms more than once per week, but not nightly. The patient is using short-acting beta agonists for symptom control several times per day. She has exacerbations requiring oral systemic corticosteroids 1 times per year. Current limitations in activity from asthma: none. Number of days of school or work missed in the last month: not applicable. Number of urgent/emergent visit in last year: 0.  The patient is not using a spacer with MDIs. Her best peak flow rate is na. She is not monitoring peak flow rates at home.    REVIEW OF RECORDS:  asthma, HTN, HLD presenting for new evaluation. She has been seen by previous PA with pulmonology but is new to me.  Pt has had a series of severe exacerbations since 2020- twice requiring steroid/abx. Never required hospitalization or intubation for asthma exac.  Inhalers: only uses when she feels she needs them-  flovent, albuterol  Not using flovent lately as instructions were not clear. Has needed albuterol every other day.  She perceives covid vaccine triggered an exacerbation in past. Her last exacerbation was fall 2022, was in [place], got steroid/abx then had to get more steroid/abx when she got home.  Pt never smoked but has had lifelong asthma.  Triggers: house dust  Denies nighttime wakings w/ asthma. Denies rashes, nasal allergies. Has had sinus infection once last yr. Denies heartburn/reflux.  Pt used to be a runner but not for the past 20 yr.  She is in a heel boot after 2nd L ankle surgery for fracture. She is not very active but doesn't notice respiratory limitations.  FH son and 2 maternal uncles have asthma.  Denies secondhand smoke exposure or asbestos exposure.    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     Past Surgical History:   Procedure Laterality Date    BACK SURGERY      CATARACT EXTRACTION W/  INTRAOCULAR LENS IMPLANT Right 08/22/2019    +12.5    COLONOSCOPY N/A 1/8/2020    Procedure: COLONOSCOPY;  Surgeon: Jameson Tovar MD;  Location: [facility] ENDO;  Service: Endoscopy;  Laterality: N/A;    EYE SURGERY      cataract ou    FIXATION OF SYNDESMOSIS OF ANKLE Left 10/19/2022    Procedure: FIXATION, SYNDESMOSIS, ANKLE;  Surgeon: Allen Mauricio MD;  Location: [facility] OR;  Service: Orthopedics;  Laterality: Left;    HARDWARE REMOVAL Left 2/22/2023    Procedure: REMOVAL,  HARDWARE;  Surgeon: Allen Mauricio MD;  Location: [facility] OR;  Service: Orthopedics;  Laterality: Left;  ankle    NECK SURGERY  2000    corpectomy    OPEN REDUCTION AND INTERNAL FIXATION (ORIF) OF INJURY OF ANKLE Left 10/19/2022    Procedure: ORIF, ANKLE;  Surgeon: Allen Mauricio MD;  Location: [facility] OR;  Service: Orthopedics;  Laterality: Left;    SPINAL FUSION      TONSILLECTOMY      tonsils       Review of patient's allergies indicates:   Allergen Reactions    Penicillins      Other reaction(s): Unknown     Current Outpatient Medications on File Prior to Visit   Medication Sig Dispense Refill    calcium-vitamin D3 (OS-SHABANA 500 + D3) 500 mg-5 mcg (200 unit) per tablet Take 1 tablet by mouth 2 (two) times daily with meals.      lisinopriL (PRINIVIL,ZESTRIL) 30 MG tablet Take 1 tablet (30 mg total) by mouth once daily. 90 tablet 3    multivitamin capsule Take 1 capsule by mouth once daily.      umeclidinium-vilanteroL (ANORO ELLIPTA) 62.5-25 mcg/actuation DsDv Inhale 1 puff into the lungs Daily. Controller. 90 each 3     No current facility-administered medications on file prior to visit.     Social History     Socioeconomic History    Marital status:    Occupational History     Employer: [employer]     Comment: retired (08/30/2015)   Tobacco Use    Smoking status: Never     Passive exposure: Never    Smokeless tobacco: Never   Substance and Sexual Activity    Alcohol use: Yes     Alcohol/week: 7.0 standard drinks of alcohol     Types: 7 Glasses of wine per week    Drug use: No    Sexual activity: Yes     Partners: Male     Birth control/protection: None     Social Determinants of Health     Financial Resource Strain: Low Risk  (4/26/2023)    Overall Financial Resource Strain (CARDIA)     Difficulty of Paying Living Expenses: Not hard at all   Food Insecurity: No Food Insecurity (4/26/2023)    Hunger Vital Sign     Worried About Running Out of Food in the Last Year: Never true     Ran  "Out of Food in the Last Year: Never true   Transportation Needs: No Transportation Needs (4/26/2023)    PRAPARE - Transportation     Lack of Transportation (Medical): No     Lack of Transportation (Non-Medical): No   Physical Activity: Sufficiently Active (4/26/2023)    Exercise Vital Sign     Days of Exercise per Week: 5 days     Minutes of Exercise per Session: 60 min   Stress: Stress Concern Present (4/26/2023)    [employer] [place] [employer] - Occupational Stress Questionnaire     Feeling of Stress : Rather much   Housing Stability: Low Risk  (4/26/2023)    Housing Stability Vital Sign     Unable to Pay for Housing in the Last Year: No     Number of Places Lived in the Last Year: 1     Unstable Housing in the Last Year: No     Family History   Problem Relation Name Age of Onset    Hypertension Mother      Macular degeneration Mother      Hypertension Father      Cancer Father          prostate    Pancreatic cancer Maternal Grandmother 88     Heart failure Maternal Grandfather 80's     Dementia Paternal Grandmother 70's     Breast cancer Maternal Aunt         Review of Systems    Objective:      /76   Pulse 77   Resp (!) 21   Ht 5' 3" (1.6 m)   Wt 55 kg (121 lb 5.1 oz)   SpO2 98%   BMI 21.49 kg/m²   Physical Exam  Personal Diagnostic Review  Chest x-ray: rib fractures on the right        7/10/2024     3:37 PM   Pulmonary Studies Review   SpO2 98 %   Height 5' 3" (1.6 m)   Weight 55 kg (121 lb 5.1 oz)   BMI (Calculated) 21.5   Predicted Distance 312.42   Predicted Distance Meters (Calculated) 450.86 meters       Mammo Digital Screening Bilat w/ West  Narrative: Result:  Mammo Digital Screening Bilat w/ West    History:  Patient is 74 y.o. and is seen for a screening mammogram.    Films Compared:  Prior images (if available) were compared.     Findings:  This procedure was performed using tomosynthesis.   Computer-aided detection was utilized in the interpretation of this "   examination.    There are scattered areas of fibroglandular density. There is no evidence   of suspicious masses, microcalcifications or architectural distortion.  Impression:    No mammographic evidence of malignancy.    BI-RADS Category 1: Negative    Recommendation:  Routine screening mammogram in 1 year is recommended.    Your estimated lifetime risk of breast cancer (to age 85) based on   Tyrer-Cuzick risk assessment model is 2.48%.  According to the American   Cancer Society, patients with a lifetime breast cancer risk of 20% or   higher might benefit from supplemental screening tests, such as screening   breast MRI.  DXA Bone Density Axial Skeleton 1 or more sites  Narrative: EXAMINATION:  DXA BONE DENSITY AXIAL SKELETON 1 OR MORE SITES    CLINICAL HISTORY:  Asymptomatic menopausal state    TECHNIQUE:  DXA scanning was performed over the left hip and lumbar spine.  Review of the images confirms satisfactory positioning and technique.    COMPARISON:  DEXA scan-04/13/2022    FINDINGS:  The L1-L4 vertebral bone mineral density is equal to 0.996 g/cm squared with a T score of -0.5.  There has been a 3.1% statistically significant interval decrease in bone mineral density of the lumbar spine relative to the prior study.    The left femoral neck bone mineral density is equal to 0.580 g/cm squared with a T score of -2.4.  There has been  a 10.8% statistically significant interval decrease in bone mineral density of the left femoral neck relative to the prior study.    There is a 21% risk of a major osteoporotic fracture and a 7% risk of hip fracture in the next 10 years (FRAX).  Impression: Osteopenia    Consider FDA approved medical therapies in postmenopausal women and men aged 50 years and older, based on the following:    *A hip or vertebral (clinical or morphometric) fracture  *T score less than or equal to -2.5 at the femoral neck or spine after appropriate evaluation to exclude secondary causes.  *Low bone  "mass -- also known as osteopenia (T score between -1.0 and -2.5 at the femoral neck or spine) and a 10 year probability of hip fracture greater than or equal to 3% or a 10 year probability of major osteoporosis-related fracture greater than or equal to 20% based on the [place]-adapted WHO algorithm.  *Clinicians judgment and/or patient preference may indicate treatment for people with 10 year fracture probabilities is above or below these levels.    Electronically signed by: Petr Guerra MD  Date:    06/17/2024  Time:    09:31      Office Spirometry Results:         7/10/2024     3:37 PM 7/3/2024     1:00 PM 6/5/2024     3:53 PM 1/24/2024     4:45 PM 8/7/2023     1:36 PM 6/29/2023     1:25 PM 6/8/2023     2:00 PM   Pulmonary Function Tests   SpO2 98 % 98 % 99 % 98 % 96 %     Height 5' 3" (1.6 m) 5' 3" (1.6 m)  5' 3" (1.6 m) 5' 3" (1.6 m) 5' 3" (1.6 m) 5' 3" (1.6 m)   Weight 55 kg (121 lb 5.1 oz) 55.3 kg (121 lb 12.9 oz) 54.1 kg (119 lb 2.5 oz) 56.7 kg (125 lb) 57.1 kg (125 lb 14.1 oz) 56.7 kg (125 lb) 56.7 kg (125 lb)   BMI (Calculated) 21.5 21.6  22.1 22.3 22.1 22.1         7/10/2024     3:37 PM   Pulmonary Studies Review   SpO2 98 %   Height 5' 3" (1.6 m)   Weight 55 kg (121 lb 5.1 oz)   BMI (Calculated) 21.5   Predicted Distance 312.42   Predicted Distance Meters (Calculated) 450.86 meters           No results found for this or any previous visit (from the past 336 hour(s)).    Assessment:            Moderate persistent asthma without complication    Asthma-COPD overlap syndrome  -     Ambulatory referral/consult to Pulmonology  -     albuterol (PROAIR HFA) 90 mcg/actuation inhaler; Inhale 2 puffs into the lungs every 4 (four) hours as needed for Wheezing or Shortness of Breath. Rescue  Dispense: 18 g; Refill: 11  -     albuterol-ipratropium (DUO-NEB) 2.5 mg-0.5 mg/3 mL nebulizer solution; Take 3 mLs by nebulization every 6 (six) hours as needed for Wheezing or Shortness of Breath. Rescue. RINSE MOUTH AFTER USE.  " Dispense: 360 mL; Refill: 11  -     Aspergillus fumagatus IgE; Future; Expected date: 07/10/2024  -     Bermuda grass IgE; Future; Expected date: 07/10/2024  -     Cat epithelium IgE; Future; Expected date: 07/10/2024  -     Cladosporium IgE; Future; Expected date: 07/10/2024  -     [place], bald IgE; Future; Expected date: 07/10/2024  -     D. farinae IgE; Future; Expected date: 07/10/2024  -     D. pteronyssinus IgE; Future; Expected date: 07/10/2024  -     Dog dander IgE; Future; Expected date: 07/10/2024  -     Plantain, English IgE; Future; Expected date: 07/10/2024  -     Darnell grass IgE; Future; Expected date: 07/10/2024  -     [place] elder, rough IgE; Future; Expected date: 07/10/2024  -     Mugwort IgE; Future; Expected date: 07/10/2024  -     Nettle IgE; Future; Expected date: 07/10/2024  -     Oak, white IgE; Future; Expected date: 07/10/2024  -     Penicillium IgE; Future; Expected date: 07/10/2024  -     Ragweed, short, common IgE; Future; Expected date: 07/10/2024  -     Irineo IgE; Future; Expected date: 07/10/2024  -     Allergen, Cocklebur; Future; Expected date: 07/10/2024  -     Allergen, Elm Cedar; Future; Expected date: 07/10/2024  -     Allergen, Meadow Grass ([facility]); Future; Expected date: 07/10/2024  -     Allergen, Mucor Racemosus; Future; Expected date: 07/10/2024  -     Allergen, Pecan Hickory IgE; Future; Expected date: 07/10/2024  -     Allergen, [place]; Future; Expected date: 07/10/2024  -     Allergen-Alternaria Alternata; Future; Expected date: 07/10/2024  -     Allergen-[place]; Future; Expected date: 07/10/2024  -     Allergen-Common Pigweed; Future; Expected date: 07/10/2024  -     Allergen-Silver Birch; Future; Expected date: 07/10/2024  -     Feather Panel #2; Future; Expected date: 07/10/2024  -     RAST Allergen for [place]; Future; Expected date: 07/10/2024  -     RAST Allergen Maple ([place]); Future; Expected date: 07/10/2024  -     RAST Allergen  [place]; Future; Expected date: 07/10/2024  -     RAST Allergen, Lamb's Quarters; Future; Expected date: 07/10/2024  -     RAST Allergen, Sheep [place]); Future; Expected date: 07/10/2024  -     Six Minute Walk Test to qualify for Home Oxygen; Future  -     Complete PFT with bronchodilator; Future; Expected date: 07/10/2024    Unexplained chronic cough  -     Ambulatory referral/consult to Pulmonology    Apical lung scarring  -     Ambulatory referral/consult to Pulmonology    Non-seasonal allergic rhinitis due to other allergic trigger  -     Aspergillus fumagatus IgE; Future; Expected date: 07/10/2024  -     Bermuda grass IgE; Future; Expected date: 07/10/2024  -     Cat epithelium IgE; Future; Expected date: 07/10/2024  -     Cladosporium IgE; Future; Expected date: 07/10/2024  -     [place], bald IgE; Future; Expected date: 07/10/2024  -     D. farinae IgE; Future; Expected date: 07/10/2024  -     D. pteronyssinus IgE; Future; Expected date: 07/10/2024  -     Dog dander IgE; Future; Expected date: 07/10/2024  -     Plantain, English IgE; Future; Expected date: 07/10/2024  -     Darnell grass IgE; Future; Expected date: 07/10/2024  -     Musa elder, rough IgE; Future; Expected date: 07/10/2024  -     Mugwort IgE; Future; Expected date: 07/10/2024  -     Nettle IgE; Future; Expected date: 07/10/2024  -     Oak, white IgE; Future; Expected date: 07/10/2024  -     Penicillium IgE; Future; Expected date: 07/10/2024  -     Ragweed, short, common IgE; Future; Expected date: 07/10/2024  -     Irineo IgE; Future; Expected date: 07/10/2024  -     Allergen, Cocklebur; Future; Expected date: 07/10/2024  -     Allergen, Elm Cedar; Future; Expected date: 07/10/2024  -     Allergen, Meadow Grass ([place] Blue); Future; Expected date: 07/10/2024  -     Allergen, Mucor Racemosus; Future; Expected date: 07/10/2024  -     Allergen, Pecan Hickory IgE; Future; Expected date: 07/10/2024  -     Allergen, [place];  Future; Expected date: 07/10/2024  -     Allergen-Alternaria Alternata; Future; Expected date: 07/10/2024  -     Allergen-[place]; Future; Expected date: 07/10/2024  -     Allergen-Common Pigweed; Future; Expected date: 07/10/2024  -     Allergen-Silver Birch; Future; Expected date: 07/10/2024  -     Feather Panel #2; Future; Expected date: 07/10/2024  -     RAST Allergen for [place]; Future; Expected date: 07/10/2024  -     RAST Allergen Maple ([place]); Future; Expected date: 07/10/2024  -     RAST Allergen [place]; Future; Expected date: 07/10/2024  -     RAST Allergen, Lamb's Quarters; Future; Expected date: 07/10/2024  -     RAST Allergen, Sheep [place]); Future; Expected date: 07/10/2024    Exercise hypoxemia  -     Six Minute Walk Test to qualify for Home Oxygen; Future          Outpatient Encounter Medications as of 7/10/2024   Medication Sig Dispense Refill    calcium-vitamin D3 (OS-SHABANA 500 + D3) 500 mg-5 mcg (200 unit) per tablet Take 1 tablet by mouth 2 (two) times daily with meals.      lisinopriL (PRINIVIL,ZESTRIL) 30 MG tablet Take 1 tablet (30 mg total) by mouth once daily. 90 tablet 3    multivitamin capsule Take 1 capsule by mouth once daily.      umeclidinium-vilanteroL (ANORO ELLIPTA) 62.5-25 mcg/actuation DsDv Inhale 1 puff into the lungs Daily. Controller. 90 each 3    [DISCONTINUED] albuterol (PROAIR HFA) 90 mcg/actuation inhaler Inhale 2 puffs into the lungs every 4 (four) hours as needed for Wheezing or Shortness of Breath. Rescue 18 g 5    [DISCONTINUED] albuterol-ipratropium (DUO-NEB) 2.5 mg-0.5 mg/3 mL nebulizer solution Take 3 mLs by nebulization every 6 (six) hours as needed for Wheezing or Shortness of Breath. Rescue. RINSE MOUTH AFTER USE. 75 mL 0    albuterol (PROAIR HFA) 90 mcg/actuation inhaler Inhale 2 puffs into the lungs every 4 (four) hours as needed for Wheezing or Shortness of Breath. Rescue 18 g 11    albuterol-ipratropium (DUO-NEB) 2.5 mg-0.5 mg/3 mL  nebulizer solution Take 3 mLs by nebulization every 6 (six) hours as needed for Wheezing or Shortness of Breath. Rescue. RINSE MOUTH AFTER USE. 360 mL 11    [DISCONTINUED] prednisoLONE acetate (PRED FORTE) 1 % DrpS Place 1 drop into both eyes 4 (four) times daily. (Patient not taking: Reported on 6/5/2024) 5 mL 0     No facility-administered encounter medications on file as of 7/10/2024.     Plan:       Requested Prescriptions     Signed Prescriptions Disp Refills    albuterol (PROAIR HFA) 90 mcg/actuation inhaler 18 g 11     Sig: Inhale 2 puffs into the lungs every 4 (four) hours as needed for Wheezing or Shortness of Breath. Rescue    albuterol-ipratropium (DUO-NEB) 2.5 mg-0.5 mg/3 mL nebulizer solution 360 mL 11     Sig: Take 3 mLs by nebulization every 6 (six) hours as needed for Wheezing or Shortness of Breath. Rescue. RINSE MOUTH AFTER USE.     Problem List Items Addressed This Visit       Asthma-COPD overlap syndrome (Chronic)    Relevant Medications    albuterol (PROAIR HFA) 90 mcg/actuation inhaler    albuterol-ipratropium (DUO-NEB) 2.5 mg-0.5 mg/3 mL nebulizer solution    Other Relevant Orders    Aspergillus fumagatus IgE    Bermuda grass IgE    Cat epithelium IgE    Cladosporium IgE    [place], bald IgE    D. farinae IgE    D. pteronyssinus IgE    Dog dander IgE    Plantain, English IgE    Darnell grass IgE    Marsh elder, rough IgE    Mugwort IgE    Nettle IgE    Oak, white IgE    Penicillium IgE    Ragweed, short, common IgE    Irineo IgE    Allergen, Cocklebur    Allergen, Elm [place]    Allergen, Meadow Grass ([place])    Allergen, Mucor Racemosus    Allergen, Pecan Hickory IgE    Allergen, White Toro    Allergen-Alternaria Alternata    Allergen-[place]    Allergen-Common Pigweed    Allergen-Silver Birch    Feather Panel #2    RAST Allergen for [place]    RAST Allergen Maple ([place])    RAST Allergen [place]    RAST Allergen, Lamb's Quarters    RAST Allergen, Sheep [place])     Six Minute Walk Test to qualify for Home Oxygen    Complete PFT with bronchodilator    Moderate persistent asthma without complication - Primary (Chronic)    Apical lung scarring    Unexplained chronic cough     Other Visit Diagnoses       Non-seasonal allergic rhinitis due to other allergic trigger        Relevant Orders    Aspergillus fumagatus IgE    [place]erm[place] grass IgE    Cat epithelium IgE    Cladosporium IgE    [place], bald IgE    D. farinae IgE    D. pteronyssinus IgE    Dog dander IgE    Plantain, English IgE    Darnell grass IgE    Marsh elder, rough IgE    Mugwort IgE    Nettle IgE    Oak, white IgE    Penicillium IgE    Ragweed, short, common IgE    Irineo IgE    Allergen, Cocklebur    Allergen, Elm [place]    Allergen, Meadow Grass ([place] Blue)    Allergen, Mucor Racemosus    Allergen, Pecan Hickory IgE    Allergen, White Toro    Allergen-Alternaria Alternata    Allergen-[place]    Allergen-Common Pigweed    Allergen-Silver Birch    Feather Panel #2    RAST Allergen for [place]    RAST Allergen Maple ([place])    RAST Allergen [place]    RAST Allergen, Lamb's Quarters    RAST Allergen, Sheep [place])    Exercise hypoxemia        Relevant Orders    Six Minute Walk Test to qualify for Home Oxygen               Follow up in about 3 months (around 10/10/2024) for 6 min walk - on return, PFT -return.    MEDICAL DECISION MAKING: Moderate to high complexity.  Overall, the multiple problems listed are of moderate to high severity that may impact quality of life and activities of daily living. Side effects of medications, treatment plan as well as options and alternatives reviewed and discussed with patient. There was counseling of patient concerning these issues.    Total time spent in counseling and coordination of care - 45  minutes of total time spent on the encounter, which includes face to face time and non-face to face time preparing to see the patient (eg, review of tests),  Obtaining and/or reviewing separately obtained history, Documenting clinical information in the electronic or other health record, Independently interpreting results (not separately reported) and communicating results to the patient/family/caregiver, or Care coordination (not separately reported).    Time was used in discussion of prognosis, risks, benefits of treatment, instructions and compliance with regimen . Discussion with other physicians and/or health care providers - home health or for use of durable medical equipment (oxygen, nebulizers, CPAP, BiPAP) occurred.

## 2024-07-12 LAB
AMER SYCAMORE IGE QN: <0.1 KU/L
FEATHER PANEL #2: <0.1 KU/L

## 2024-07-16 ENCOUNTER — HOSPITAL ENCOUNTER (OUTPATIENT)
Dept: RADIOLOGY | Facility: HOSPITAL | Age: 74
Discharge: HOME | End: 2024-07-16
Attending: STUDENT IN AN ORGANIZED HEALTH CARE EDUCATION/TRAINING PROGRAM
Payer: MEDICARE

## 2024-07-16 DIAGNOSIS — J44.89 ASTHMA-COPD OVERLAP SYNDROME: Chronic | ICD-10-CM

## 2024-07-16 DIAGNOSIS — J98.4 APICAL LUNG SCARRING: ICD-10-CM

## 2024-07-16 DIAGNOSIS — R05.3 UNEXPLAINED CHRONIC COUGH: ICD-10-CM

## 2024-07-16 LAB
A ALTERNATA IGE QN: <0.1 KU/L
A FUMIGATUS IGE QN: <0.1 KU/L
ALLERGEN BOXELDER MAPLE TREE IGE: <0.1 KU/L
ALLERGEN MAPLE (BOX ELDER) CLASS: NORMAL
ALLERGEN MULBERRY CLASS: NORMAL
ALLERGEN MULBERRY TREE IGE: <0.1 KU/L
ALLERGEN PIGWEED IGE: <0.1 KU/L
ALLERGEN WHITE ASH TREE IGE: <0.1 KU/L
BALD CYPRESS IGE QN: <0.1 KU/L
BERMUDA GRASS IGE QN: <0.1 KU/L
C HERBARUM IGE QN: <0.1 KU/L
CAT DANDER IGE QN: <0.1 KU/L
CEDAR IGE QN: <0.1 KU/L
COCKLEBUR IGE QN: <0.1 KU/L
COMMON PIGWEED CLASS: NORMAL
COMMON RAGWEED IGE QN: <0.1 KU/L
D FARINAE IGE QN: 5.78 KU/L
D PTERONYSS IGE QN: 8.48 KU/L
DEPRECATED A ALTERNATA IGE RAST QL: NORMAL
DEPRECATED A FUMIGATUS IGE RAST QL: NORMAL
DEPRECATED BALD CYPRESS IGE RAST QL: NORMAL
DEPRECATED BERMUDA GRASS IGE RAST QL: NORMAL
DEPRECATED C HERBARUM IGE RAST QL: NORMAL
DEPRECATED CAT DANDER IGE RAST QL: NORMAL
DEPRECATED CEDAR IGE RAST QL: NORMAL
DEPRECATED COCKLEBUR IGE RAST QL: NORMAL
DEPRECATED COMMON RAGWEED IGE RAST QL: NORMAL
DEPRECATED D FARINAE IGE RAST QL: ABNORMAL
DEPRECATED D PTERONYSS IGE RAST QL: ABNORMAL
DEPRECATED DOG DANDER IGE RAST QL: NORMAL
DEPRECATED ELDER IGE RAST QL: NORMAL
DEPRECATED ENGL PLANTAIN IGE RAST QL: NORMAL
DEPRECATED GOOSEFOOT IGE RAST QL: NORMAL
DEPRECATED JOHNSON GRASS IGE RAST QL: NORMAL
DEPRECATED KENT BLUE GRASS IGE RAST QL: NORMAL
DEPRECATED M RACEMOSUS IGE RAST QL: NORMAL
DEPRECATED MUGWORT IGE RAST QL: NORMAL
DEPRECATED NETTLE IGE RAST QL: NORMAL
DEPRECATED P NOTATUM IGE RAST QL: NORMAL
DEPRECATED PECAN/HICK TREE IGE RAST QL: NORMAL
DEPRECATED SHEEP SORREL IGE RAST QL: NORMAL
DEPRECATED SILVER BIRCH IGE RAST QL: NORMAL
DEPRECATED TIMOTHY IGE RAST QL: NORMAL
DEPRECATED WHITE OAK IGE RAST QL: NORMAL
DOG DANDER IGE QN: <0.1 KU/L
ELDER IGE QN: <0.1 KU/L
ELM CEDAR CLASS: NORMAL
ELM CEDAR, IGE: <0.1 KU/L
ENGL PLANTAIN IGE QN: <0.1 KU/L
GOOSEFOOT IGE QN: <0.1 KU/L
JOHNSON GRASS IGE QN: <0.1 KU/L
KENT BLUE GRASS IGE QN: <0.1 KU/L
M RACEMOSUS IGE QN: <0.1 KU/L
MUGWORT IGE QN: <0.1 KU/L
NETTLE IGE QN: <0.1 KU/L
P NOTATUM IGE QN: <0.1 KU/L
PECAN/HICK TREE IGE QN: <0.1 KU/L
SHEEP SORREL IGE QN: <0.1 KU/L
SILVER BIRCH IGE QN: <0.1 KU/L
TIMOTHY IGE QN: <0.1 KU/L
WHITE ASH CLASS: NORMAL
WHITE OAK IGE QN: <0.1 KU/L

## 2024-07-16 PROCEDURE — 71250 CT THORAX DX C-: CPT | Mod: 26,,, | Performed by: RADIOLOGY

## 2024-07-16 PROCEDURE — 71250 CT THORAX DX C-: CPT | Mod: TC,PO

## 2024-07-31 ENCOUNTER — PATIENT MESSAGE (OUTPATIENT)
Dept: RESEARCH | Facility: HOSPITAL | Age: 74
End: 2024-07-31
Payer: MEDICARE

## 2024-08-03 DIAGNOSIS — J44.89 ASTHMA-COPD OVERLAP SYNDROME: Chronic | ICD-10-CM

## 2024-08-05 RX ORDER — IPRATROPIUM BROMIDE AND ALBUTEROL SULFATE 2.5; .5 MG/3ML; MG/3ML
SOLUTION RESPIRATORY (INHALATION)
Qty: 90 ML | Refills: 0 | Status: SHIPPED | OUTPATIENT
Start: 2024-08-05

## 2025-02-24 DIAGNOSIS — Z00.00 ENCOUNTER FOR MEDICARE ANNUAL WELLNESS EXAM: ICD-10-CM

## 2025-06-12 DIAGNOSIS — J44.89 ASTHMA-COPD OVERLAP SYNDROME: Chronic | ICD-10-CM

## 2025-06-12 NOTE — TELEPHONE ENCOUNTER
Care Due:                  Date            Visit Type   Department     Provider  --------------------------------------------------------------------------------                                EP -                              PRIMARY      [facility]  Last Visit: 07-      CARE ([facility])   MEDICINE       Neli Calderon  Next Visit: None Scheduled  None         None Found                                                            Last  Test          Frequency    Reason                     Performed    Due Date  --------------------------------------------------------------------------------    CMP.........  12 months..  lisinopriL...............  07-   07-    [facility] Embedded Care Due Messages. Reference number: [id number].   6/12/2025 1:32:10 PM CDT

## 2025-06-12 NOTE — TELEPHONE ENCOUNTER
Refill Routing Note   Medication(s) are not appropriate for processing by [facility] for the following reason(s):        No active prescription written by provider    [facility] action(s):  Defer   Requires labs : Yes             Appointments  past 12m or future 3m with PCP    Date Provider   Last Visit   7/3/2024 Neli Caldreon MD   Next Visit   Visit date not found Neli Calderon MD   ED visits in past 90 days: 0        Note composed:3:36 PM 06/12/2025

## 2025-06-14 RX ORDER — UMECLIDINIUM BROMIDE AND VILANTEROL TRIFENATATE 62.5; 25 UG/1; UG/1
1 POWDER RESPIRATORY (INHALATION)
Qty: 60 EACH | Refills: 0 | Status: SHIPPED | OUTPATIENT
Start: 2025-06-14

## 2025-06-15 DIAGNOSIS — J44.89 ASTHMA-COPD OVERLAP SYNDROME: Chronic | ICD-10-CM

## 2025-06-15 NOTE — TELEPHONE ENCOUNTER
No care due was identified.  [facility] Embedded Care Due Messages. Reference number: [id number].   6/15/2025 6:01:58 AM CDT

## 2025-06-16 DIAGNOSIS — J44.89 ASTHMA-COPD OVERLAP SYNDROME: Chronic | ICD-10-CM

## 2025-06-16 RX ORDER — UMECLIDINIUM BROMIDE AND VILANTEROL TRIFENATATE 62.5; 25 UG/1; UG/1
1 POWDER RESPIRATORY (INHALATION)
Qty: 60 EACH | Refills: 0 | OUTPATIENT
Start: 2025-06-16

## 2025-06-16 NOTE — TELEPHONE ENCOUNTER
Refill Decision Note   Cindy Ramirez  is requesting a refill authorization.  Brief Assessment and Rationale for Refill:  Quick Discontinue     Medication Therapy Plan: DUPLICATE      Comments:     Note composed:7:55 AM 06/16/2025

## 2025-06-16 NOTE — TELEPHONE ENCOUNTER
Refill Decision Note   Cindy James  is requesting a refill authorization.  Brief Assessment and Rationale for Refill:  Quick Discontinue     Medication Therapy Plan: E-Prescribing Status: Receipt confirmed by pharmacy (6/14/2025  1:10 PM CDT)      Comments:     Note composed:8:16 AM 06/16/2025

## 2025-06-16 NOTE — TELEPHONE ENCOUNTER
No care due was identified.  [facility] Embedded Care Due Messages. Reference number: [id number].   6/16/2025 7:15:22 AM CDT

## 2025-06-23 ENCOUNTER — RESULTS FOLLOW-UP (OUTPATIENT)
Dept: FAMILY MEDICINE | Facility: CLINIC | Age: 75
End: 2025-06-23

## 2025-07-12 DIAGNOSIS — J44.89 ASTHMA-COPD OVERLAP SYNDROME: Chronic | ICD-10-CM

## 2025-07-12 NOTE — TELEPHONE ENCOUNTER
Care Due:                  Date            Visit Type   Department     Provider  --------------------------------------------------------------------------------                                EP -                              PRIMARY      [facility] FAMILY  Last Visit: 07-      CARE ([facility])   MEDICINE       Neli Calderon  Next Visit: None Scheduled  None         None Found                                                            Last  Test          Frequency    Reason                     Performed    Due Date  --------------------------------------------------------------------------------    Office Visit  15 months..  Valentín THURMAN........  07- 09-    [facility] Embedded Care Due Messages. Reference number: [id number].   7/12/2025 8:45:42 AM CDT

## 2025-07-14 RX ORDER — UMECLIDINIUM BROMIDE AND VILANTEROL TRIFENATATE 62.5; 25 UG/1; UG/1
1 POWDER RESPIRATORY (INHALATION)
Qty: 180 EACH | Refills: 0 | Status: SHIPPED | OUTPATIENT
Start: 2025-07-14

## 2025-07-14 NOTE — TELEPHONE ENCOUNTER
Refill Routing Note   Medication(s) are not appropriate for processing by [facility] for the following reason(s):        Required vitals outdated    [facility] action(s):  Defer   Requires appointment : Yes               Appointments  past 12m or future 3m with PCP    Date Provider   Last Visit   7/3/2024 Neli Calderon MD   Next Visit   Visit date not found Neli Calderon MD   ED visits in past 90 days: 0        Note composed:8:09 PM 07/13/2025

## 2025-07-14 NOTE — TELEPHONE ENCOUNTER
One-time refill request approved - patient will need to schedule clinic appointment prior to additional medication refills.     Neli Calderon MD  [facility]  Office: [phone]   Fax: [phone]  07/14/2025

## 2025-07-16 DIAGNOSIS — I10 HTN (HYPERTENSION): ICD-10-CM

## 2025-07-18 DIAGNOSIS — I10 PRIMARY HYPERTENSION: Chronic | ICD-10-CM

## 2025-07-18 RX ORDER — LISINOPRIL 30 MG/1
30 TABLET ORAL
Qty: 90 TABLET | Refills: 0 | Status: SHIPPED | OUTPATIENT
Start: 2025-07-18

## 2025-07-18 NOTE — TELEPHONE ENCOUNTER
No care due was identified.  [facility] Embedded Care Due Messages. Reference number: [id number].   7/18/2025 11:21:01 AM CDT

## 2025-07-18 NOTE — TELEPHONE ENCOUNTER
Refill Routing Note   Medication(s) are not appropriate for processing by [facility] for the following reason(s):        Required labs outdated  Required vitals outdated    [facility] action(s):  Defer               Appointments  past 12m or future 3m with PCP    Date Provider   Last Visit   7/3/2024 Neli Calderon MD   Next Visit   7/22/2025 Neli Calderon MD   ED visits in past 90 days: 0        Note composed:3:24 PM 07/18/2025

## 2025-07-22 ENCOUNTER — OFFICE VISIT (OUTPATIENT)
Dept: FAMILY MEDICINE | Facility: CLINIC | Age: 75
End: 2025-07-22
Payer: MEDICARE

## 2025-07-22 ENCOUNTER — HOSPITAL ENCOUNTER (OUTPATIENT)
Dept: RADIOLOGY | Facility: HOSPITAL | Age: 75
Discharge: HOME | End: 2025-07-22
Attending: STUDENT IN AN ORGANIZED HEALTH CARE EDUCATION/TRAINING PROGRAM
Payer: MEDICARE

## 2025-07-22 VITALS
WEIGHT: 125.75 LBS | HEART RATE: 74 BPM | HEIGHT: 63 IN | OXYGEN SATURATION: 98 % | BODY MASS INDEX: 22.28 KG/M2 | DIASTOLIC BLOOD PRESSURE: 80 MMHG | SYSTOLIC BLOOD PRESSURE: 124 MMHG

## 2025-07-22 DIAGNOSIS — J44.89 ASTHMA-COPD OVERLAP SYNDROME: Primary | Chronic | ICD-10-CM

## 2025-07-22 DIAGNOSIS — I10 PRIMARY HYPERTENSION: Chronic | ICD-10-CM

## 2025-07-22 DIAGNOSIS — Z00.00 PREVENTATIVE HEALTH CARE: ICD-10-CM

## 2025-07-22 DIAGNOSIS — Z13.220 ENCOUNTER FOR SCREENING FOR LIPID DISORDER: ICD-10-CM

## 2025-07-22 DIAGNOSIS — M85.852 OSTEOPENIA OF LEFT HIP: Chronic | ICD-10-CM

## 2025-07-22 DIAGNOSIS — Z87.81 HISTORY OF FRACTURE OF LEFT ANKLE: ICD-10-CM

## 2025-07-22 DIAGNOSIS — M81.0 POSTMENOPAUSAL BONE LOSS: ICD-10-CM

## 2025-07-22 DIAGNOSIS — Z13.1 ENCOUNTER FOR SCREENING FOR DIABETES MELLITUS: ICD-10-CM

## 2025-07-22 PROCEDURE — 99999 PR PBB SHADOW E&M-EST. PATIENT-LVL V: CPT | Mod: PBBFAC,,, | Performed by: STUDENT IN AN ORGANIZED HEALTH CARE EDUCATION/TRAINING PROGRAM

## 2025-07-22 PROCEDURE — 99215 OFFICE O/P EST HI 40 MIN: CPT | Mod: PBBFAC,25,PN | Performed by: STUDENT IN AN ORGANIZED HEALTH CARE EDUCATION/TRAINING PROGRAM

## 2025-07-22 PROCEDURE — 73610 X-RAY EXAM OF ANKLE: CPT | Mod: 26,LT,, | Performed by: RADIOLOGY

## 2025-07-22 PROCEDURE — 99214 OFFICE O/P EST MOD 30 MIN: CPT | Mod: S$PBB,,, | Performed by: STUDENT IN AN ORGANIZED HEALTH CARE EDUCATION/TRAINING PROGRAM

## 2025-07-22 PROCEDURE — G2211 COMPLEX E/M VISIT ADD ON: HCPCS | Mod: S$PBB,,, | Performed by: STUDENT IN AN ORGANIZED HEALTH CARE EDUCATION/TRAINING PROGRAM

## 2025-07-22 PROCEDURE — 73610 X-RAY EXAM OF ANKLE: CPT | Mod: TC,PN,LT

## 2025-07-22 RX ORDER — LISINOPRIL 30 MG/1
30 TABLET ORAL DAILY
Qty: 90 TABLET | Refills: 3 | Status: SHIPPED | OUTPATIENT
Start: 2025-07-22 | End: 2026-07-17

## 2025-07-22 RX ORDER — UMECLIDINIUM BROMIDE AND VILANTEROL TRIFENATATE 62.5; 25 UG/1; UG/1
1 POWDER RESPIRATORY (INHALATION) DAILY
Qty: 60 EACH | Refills: 5 | Status: SHIPPED | OUTPATIENT
Start: 2025-07-22 | End: 2026-07-17

## 2025-07-22 NOTE — PROGRESS NOTES
"Plan:      Cindy was seen today for annual exam.    Diagnoses and all orders for this visit:    Asthma-COPD overlap syndrome  -     ANORO ELLIPTA 62.5-25 mcg/actuation DsDv; Take 1 puff by mouth once daily.  -     CBC Auto Differential; Future    Primary hypertension  -     lisinopriL (PRINIVIL,ZESTRIL) 30 MG tablet; Take 1 tablet (30 mg total) by mouth once daily.  -     Comprehensive Metabolic Panel; Future    Osteopenia of left hip  -     Comprehensive Metabolic Panel; Future  -     Vitamin D; Future    Preventative health care  -     Hemoglobin A1C; Future  -     Lipid Panel; Future  -     Comprehensive Metabolic Panel; Future  -     CBC Auto Differential; Future    Encounter for screening for diabetes mellitus  -     Hemoglobin A1C; Future  -     Comprehensive Metabolic Panel; Future    Encounter for screening for lipid disorder  -     Lipid Panel; Future    History of fracture of left ankle  -     X-Ray Ankle Complete Left; Future  -     Ambulatory referral/consult to Orthopedics; Future    Postmenopausal bone loss  -     Vitamin D; Future      Follow up if symptoms worsen or fail to improve.    Neli Calderon MD  07/22/2025    Subjective:      Patient ID: Cindy Ramirez is a 75 y.o. female    Chief Complaint   Patient presents with    Annual Exam     HPI  75 y.o. female with a PMHx as documented below presents to clinic today for the following:    ANKLE PAIN AND SWELLING:  She has a history of ankle surgery with hardware placement 4 years ago following an ankle fracture. She reports significant ankle swelling present for 6 months, noting the affected ankle is twice the size of the contralateral ankle. The ankle appears red and "abnormal" in appearance. She experiences sharp pain when sleeping on her left side, preventing pressure on the ankle. She denies constant pain but reports sharp discomfort with positional changes.  _____________________________________    COPD/asthma overlap syndrome:   - Anora " "Ellipta 62.5-25 mcg/act 1 puff daily   - Albuterol PRN  - Previous Rx: Trelegy 200-62.5-25 mch 1 puff daily - unable to tolerate 2/2 recurrent oral thrush despite rinsing mouth after each use  - NO history of smoking  - Pt reports symptoms started after she received the COVID vaccine (2021)     "Mild biapical pulmonary scarring redemonstrated" on CT soft tissue neck w/ contrast on 7/3/23     Last seen by Pulmonology (Dr. Pérez) on 3/2/23:           HTN:   - Lisinopril 30 mg daily      Osteopenia:   - DEXA (6/17/24) w/ osteopenia of the left hip, normal bone mineral density of the lumbar spine  - Daily calcium + vit D supplementation    ROS  Constitutional:  Negative for chills and fever.   Respiratory:  Negative for shortness of breath.    Cardiovascular:  Negative for chest pain.   Gastrointestinal:  Negative for abdominal pain, constipation, diarrhea, nausea and vomiting.     Current Outpatient Medications   Medication Instructions    albuterol (PROAIR HFA) 90 mcg/actuation inhaler 2 puffs, Inhalation, Every 4 hours PRN, Rescue    albuterol-ipratropium (DUO-NEB) 2.5 mg-0.5 mg/3 mL nebulizer solution USE 1 AMPULE IN NEBULIZER EVERY 6 HOURS AS NEEDED FOR WHEEZING AND FOR SHORTNESS OF BREATH (RINSE  MOUTH  AFTERWARDS)    ANORO ELLIPTA 62.5-25 mcg/actuation DsDv 1 puff, Oral, Daily    calcium-vitamin D3 (OS-SHABANA 500 + D3) 500 mg-5 mcg (200 unit) per tablet 1 tablet, 2 times daily with meals    lisinopriL (PRINIVIL,ZESTRIL) 30 mg, Oral, Daily    multivitamin capsule 1 capsule, Daily      Past Medical History:   Diagnosis Date    Arthritis     Asthma-COPD overlap syndrome 03/02/2023    Basal cell carcinoma 2014    right cheek    Cataract     Current mild episode of major depressive disorder without prior episode 04/20/2022    HTN (hypertension) 09/19/2012    Hyperlipidemia     Hypertension     Insomnia     Mechanical complication of internal orthopedic implant 02/22/2023    Moderate persistent asthma without " "complication 12/19/2022    Osteopenia of left hip 06/08/2023    Paresis of right vocal cord     Senile purpura 04/26/2023    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    Physical Exam  Vitals reviewed.   Constitutional:       General: She is not in acute distress.  HENT:      Head: Normocephalic and atraumatic.   Cardiovascular:      Rate and Rhythm: Normal rate.   Pulmonary:      Effort: Pulmonary effort is normal. No respiratory distress.   Musculoskeletal:      Comments: Enlargement and redness of lateral malleolus of left ankle as seen in photo below. No induration or fluctuance.   Neurological:      General: No focal deficit present.      Mental Status: She is alert and oriented to person, place, and time. Mental status is at baseline.                Assessment:       1. Asthma-COPD overlap syndrome    2. Primary hypertension    3. Osteopenia of left hip    4. Preventative health care    5. Encounter for screening for diabetes mellitus    6. Encounter for screening for lipid disorder    7. History of fracture of left ankle    8. Postmenopausal bone loss        Neli Calderon MD  [facility]  Office: [phone]   Fax: [phone]  07/22/2025      Disclaimer: This note was partly generated using dictation software which may occasionally result in transcription errors.    Total time spent on this encounter includes face to face time and non-face to face time preparing to see the patient (eg, review of tests), obtaining and/or reviewing separately obtained history, documenting clinical information in the electronic or other health record, independently interpreting results, and communicating results to the patient/family/caregiver, or care coordinator.  "     Visit today included increased complexity associated with the care of the episodic problem (see above) addressed and managing the longitudinal care of the patient due to the serious and/or complex managed problem(s) (see above).

## 2025-07-23 ENCOUNTER — LAB VISIT (OUTPATIENT)
Dept: LAB | Facility: HOSPITAL | Age: 75
End: 2025-07-23
Attending: STUDENT IN AN ORGANIZED HEALTH CARE EDUCATION/TRAINING PROGRAM
Payer: MEDICARE

## 2025-07-23 DIAGNOSIS — Z13.220 ENCOUNTER FOR SCREENING FOR LIPID DISORDER: ICD-10-CM

## 2025-07-23 DIAGNOSIS — I10 HTN (HYPERTENSION): ICD-10-CM

## 2025-07-23 DIAGNOSIS — I10 PRIMARY HYPERTENSION: ICD-10-CM

## 2025-07-23 DIAGNOSIS — M81.0 POSTMENOPAUSAL BONE LOSS: ICD-10-CM

## 2025-07-23 DIAGNOSIS — M85.852 OSTEOPENIA OF LEFT HIP: ICD-10-CM

## 2025-07-23 DIAGNOSIS — Z13.1 ENCOUNTER FOR SCREENING FOR DIABETES MELLITUS: ICD-10-CM

## 2025-07-23 DIAGNOSIS — Z00.00 PREVENTATIVE HEALTH CARE: ICD-10-CM

## 2025-07-23 DIAGNOSIS — J44.89 ASTHMA-COPD OVERLAP SYNDROME: ICD-10-CM

## 2025-07-23 LAB
25(OH)D3+25(OH)D2 SERPL-MCNC: 72 NG/ML (ref 30–96)
ABSOLUTE EOSINOPHIL (OHS): 0.19 K/UL
ABSOLUTE MONOCYTE (OHS): 0.53 K/UL (ref 0.3–1)
ABSOLUTE NEUTROPHIL COUNT (OHS): 4.78 K/UL (ref 1.8–7.7)
ALBUMIN SERPL BCP-MCNC: 4 G/DL (ref 3.5–5.2)
ALP SERPL-CCNC: 67 UNIT/L (ref 40–150)
ALT SERPL W/O P-5'-P-CCNC: 13 UNIT/L (ref 10–44)
ANION GAP (OHS): 7 MMOL/L (ref 8–16)
AST SERPL-CCNC: 18 UNIT/L (ref 11–45)
BASOPHILS # BLD AUTO: 0.09 K/UL
BASOPHILS NFR BLD AUTO: 1.3 %
BILIRUB SERPL-MCNC: 0.4 MG/DL (ref 0.1–1)
BUN SERPL-MCNC: 14 MG/DL (ref 8–23)
CALCIUM SERPL-MCNC: 9.2 MG/DL (ref 8.7–10.5)
CHLORIDE SERPL-SCNC: 106 MMOL/L (ref 95–110)
CHOLEST SERPL-MCNC: 199 MG/DL (ref 120–199)
CHOLEST/HDLC SERPL: 2.6 {RATIO} (ref 2–5)
CO2 SERPL-SCNC: 30 MMOL/L (ref 23–29)
CREAT SERPL-MCNC: 0.7 MG/DL (ref 0.5–1.4)
EAG (OHS): 100 MG/DL (ref 68–131)
ERYTHROCYTE [DISTWIDTH] IN BLOOD BY AUTOMATED COUNT: 12.6 % (ref 11.5–14.5)
GFR SERPLBLD CREATININE-BSD FMLA CKD-EPI: >60 ML/MIN/1.73/M2
GLUCOSE SERPL-MCNC: 77 MG/DL (ref 70–110)
HBA1C MFR BLD: 5.1 % (ref 4–5.6)
HCT VFR BLD AUTO: 43.1 % (ref 37–48.5)
HDLC SERPL-MCNC: 77 MG/DL (ref 40–75)
HDLC SERPL: 38.7 % (ref 20–50)
HGB BLD-MCNC: 14.1 GM/DL (ref 12–16)
IMM GRANULOCYTES # BLD AUTO: 0.01 K/UL (ref 0–0.04)
IMM GRANULOCYTES NFR BLD AUTO: 0.1 % (ref 0–0.5)
LDLC SERPL CALC-MCNC: 99 MG/DL (ref 63–159)
LYMPHOCYTES # BLD AUTO: 1.19 K/UL (ref 1–4.8)
MCH RBC QN AUTO: 32.3 PG (ref 27–31)
MCHC RBC AUTO-ENTMCNC: 32.7 G/DL (ref 32–36)
MCV RBC AUTO: 99 FL (ref 82–98)
NONHDLC SERPL-MCNC: 122 MG/DL
NUCLEATED RBC (/100WBC) (OHS): 0 /100 WBC
PLATELET # BLD AUTO: 255 K/UL (ref 150–450)
PMV BLD AUTO: 10 FL (ref 9.2–12.9)
POTASSIUM SERPL-SCNC: 4.4 MMOL/L (ref 3.5–5.1)
PROT SERPL-MCNC: 7 GM/DL (ref 6–8.4)
RBC # BLD AUTO: 4.36 M/UL (ref 4–5.4)
RELATIVE EOSINOPHIL (OHS): 2.8 %
RELATIVE LYMPHOCYTE (OHS): 17.5 % (ref 18–48)
RELATIVE MONOCYTE (OHS): 7.8 % (ref 4–15)
RELATIVE NEUTROPHIL (OHS): 70.5 % (ref 38–73)
SODIUM SERPL-SCNC: 143 MMOL/L (ref 136–145)
TRIGL SERPL-MCNC: 115 MG/DL (ref 30–150)
WBC # BLD AUTO: 6.79 K/UL (ref 3.9–12.7)

## 2025-07-23 PROCEDURE — 80061 LIPID PANEL: CPT

## 2025-07-23 PROCEDURE — 80053 COMPREHEN METABOLIC PANEL: CPT

## 2025-07-23 PROCEDURE — 85025 COMPLETE CBC W/AUTO DIFF WBC: CPT

## 2025-07-23 PROCEDURE — 36415 COLL VENOUS BLD VENIPUNCTURE: CPT | Mod: PN

## 2025-07-23 PROCEDURE — 83036 HEMOGLOBIN GLYCOSYLATED A1C: CPT

## 2025-07-23 PROCEDURE — 82306 VITAMIN D 25 HYDROXY: CPT

## 2025-07-28 DIAGNOSIS — G89.29 CHRONIC PAIN OF LEFT ANKLE: Primary | ICD-10-CM

## 2025-07-28 DIAGNOSIS — M25.572 CHRONIC PAIN OF LEFT ANKLE: Primary | ICD-10-CM

## 2025-07-31 ENCOUNTER — OFFICE VISIT (OUTPATIENT)
Dept: ORTHOPEDICS | Facility: CLINIC | Age: 75
End: 2025-07-31
Payer: MEDICARE

## 2025-07-31 ENCOUNTER — HOSPITAL ENCOUNTER (OUTPATIENT)
Dept: RADIOLOGY | Facility: HOSPITAL | Age: 75
Discharge: HOME | End: 2025-07-31
Attending: ORTHOPAEDIC SURGERY
Payer: MEDICARE

## 2025-07-31 DIAGNOSIS — G89.29 CHRONIC PAIN OF LEFT ANKLE: ICD-10-CM

## 2025-07-31 DIAGNOSIS — M25.572 CHRONIC PAIN OF LEFT ANKLE: ICD-10-CM

## 2025-07-31 DIAGNOSIS — Z87.81 HISTORY OF FRACTURE OF LEFT ANKLE: ICD-10-CM

## 2025-07-31 PROCEDURE — 99212 OFFICE O/P EST SF 10 MIN: CPT | Mod: PBBFAC,25,PO | Performed by: ORTHOPAEDIC SURGERY

## 2025-07-31 PROCEDURE — 73610 X-RAY EXAM OF ANKLE: CPT | Mod: TC,PO,LT

## 2025-07-31 PROCEDURE — 99999 PR PBB SHADOW E&M-EST. PATIENT-LVL II: CPT | Mod: PBBFAC,,, | Performed by: ORTHOPAEDIC SURGERY

## 2025-07-31 PROCEDURE — 73610 X-RAY EXAM OF ANKLE: CPT | Mod: 26,LT,, | Performed by: RADIOLOGY

## 2025-07-31 NOTE — PROGRESS NOTES
Status/Diagnosis: Displaced Left trimalleolar ankle fracture.  Date of Surgery: 10/19/2022 Trimal ORIF; 02/20/2023 HWR.  Date of Injury: 10/11/2022  Return visit: PRN  X-rays on Return: pending patient complaint    Chief Complaint:   Chief Complaint   Patient presents with    Left Ankle - Pain, Swelling     Present History:  Cindy Ramirez is a 75 y.o. female who returns today after last being seen 2+ years ago.  Overall patient has done well since ankle fracture and subsequent syndesmotic screw removal.  Endorses a six-month history of worsening lateral ankle prominence with the associated pain.  Specifically pain due to irritation when lying in bed on the left side.  No skin tenting or breakdown noted.  Denies any drainage, fever, chills.  Currently full weight-bearing in normal shoe wear.        Physical exam:  There were no vitals filed for this visit.  There is no height or weight on file to calculate BMI.  General: In no apparent distress; well developed and well nourished.  HEENT: normocephalic; atraumatic.  Cardiovascular: regular rate.  Respiratory: no increased work of breathing.  Musculoskeletal:   Inspection:  Surgical sites remain all well healed.  There is moderate prominence over the distal margin of the fibula in a patient with a thin body habitus.  Hardware is not palpable.  Questionable soft tissue bursa over the area of interest however no fluctuance noted.  Mild-to-moderate tenderness on deep palpation at this site.  No medial based ankle swelling or tenderness.  Ankle range of motion from 10° dorsiflexion to 60° plantar flexion.  Sensation intact to light touch distally.  Palpable pedal pulse.    WB 3-views left ankle:    On my independent review, patient is status post by trimalleolar ankle fracture ORIF.  Fractures appear well healed.  Questionable subtle lucency of the medial malleolus.  Chronic ossicle at the tip of the distal fibula.  Ankle mortise remains congruent.  No evidence of  implant loosening or failure.     Assessment:  Cindy Ramirez is a 75 y.o. female with acute displaced left trimalleolar ankle fracture.  Date of injury:  10/11/2022.  STATUS POST Trimalleolar ankle fracture and syndesmosis ORIF on 10/19/2022.  Syndesmotic screw removal on 02/20/2023.     Plan:   Clinical and radiographic findings were discussed.  Patient reports worsening irritation and some degree of prominence of the distal fibula over the last 6 months.  No new injuries.  Pain only related to irritation, mostly at night while lying in bed on her left side.    We discussed the potential for surgical intervention to include hardware removal +/- bursa resection.    Patient would like to hold off on surgery if at all possible at this time.    She will contact my office should symptoms persist or worsen.  Patient voiced understanding.  All questions were answered.      This note was created using voice recognition software and may contain grammatical errors.

## (undated) DEVICE — Device

## (undated) DEVICE — BIT DRILL NLOK SCREW 2.5X110

## (undated) DEVICE — BANDAGE ESMARK 6X12

## (undated) DEVICE — TUBING SUC UNIV W/CONN 12FT

## (undated) DEVICE — BIT DRILL CANN2.6MM

## (undated) DEVICE — DRAPE EXTREMITY W/ABC NON-SLIP

## (undated) DEVICE — UNDERGLOVE BIOGEL PI SZ 6.5 LF

## (undated) DEVICE — DRAPE STERI U-SHAPED 47X51IN

## (undated) DEVICE — SUT 2-0 VICRYL / SH (J417)

## (undated) DEVICE — DURAPREP SURG SCRUB 26ML

## (undated) DEVICE — BNDG COFLEX FOAM LF2 ST 6X5YD

## (undated) DEVICE — BRUSH SCRUB HIBICLENS 4%

## (undated) DEVICE — GUIDEWIRE TROCAR TIP 1.35MM
Type: IMPLANTABLE DEVICE | Site: ANKLE | Status: NON-FUNCTIONAL
Removed: 2022-10-19

## (undated) DEVICE — SUT MONOCRYL 2-0 S UND

## (undated) DEVICE — APPLICATOR CHLORAPREP ORN 26ML

## (undated) DEVICE — BIT DRILL BB TAK DISPOSABLE

## (undated) DEVICE — GOWN POLY REINF BRTH SLV XL

## (undated) DEVICE — GAUZE SPONGE 4X4 12PLY

## (undated) DEVICE — SEE MEDLINE ITEM 157216

## (undated) DEVICE — SUT MONOCRYL 3-0 SH U/D

## (undated) DEVICE — UNDERGLOVES BIOGEL PI SIZE 7.5

## (undated) DEVICE — SUT VICRYL PLUS 0 CT1 36IN

## (undated) DEVICE — ALCOHOL 70% ISOP RUBBING 4OZ

## (undated) DEVICE — GLOVE SURGICAL LATEX SZ 6.5

## (undated) DEVICE — GLOVE BIOGEL PI MICRO SZ 7.5

## (undated) DEVICE — BNDG COFLEX FOAM LF2 ST 4X5YD

## (undated) DEVICE — ELECTRODE REM PLYHSV RETURN 9

## (undated) DEVICE — PENCIL ROCKER SWITCH 10FT CORD

## (undated) DEVICE — DRAPE C ARM 42 X 120 10/BX

## (undated) DEVICE — DRAPE U SPLIT SHEET 54X76IN

## (undated) DEVICE — KIT SAHARA DRAPE DRAW/LIFT

## (undated) DEVICE — BIT DRILL GRADUATED 2 MM

## (undated) DEVICE — STRAP OR TABLE 5IN X 72IN

## (undated) DEVICE — SUT ETHILON 3-0 PS2 18 BLK

## (undated) DEVICE — DRAPE THREE-QTR REINF 53X77IN